# Patient Record
Sex: FEMALE | Race: BLACK OR AFRICAN AMERICAN | NOT HISPANIC OR LATINO
[De-identification: names, ages, dates, MRNs, and addresses within clinical notes are randomized per-mention and may not be internally consistent; named-entity substitution may affect disease eponyms.]

---

## 2017-12-21 ENCOUNTER — RESULT REVIEW (OUTPATIENT)
Age: 52
End: 2017-12-21

## 2018-02-13 ENCOUNTER — INPATIENT (INPATIENT)
Facility: HOSPITAL | Age: 53
LOS: 9 days | Discharge: HOME | End: 2018-02-23
Attending: INTERNAL MEDICINE | Admitting: INTERNAL MEDICINE
Payer: MEDICARE

## 2018-02-13 VITALS
TEMPERATURE: 98 F | SYSTOLIC BLOOD PRESSURE: 123 MMHG | HEIGHT: 63 IN | DIASTOLIC BLOOD PRESSURE: 85 MMHG | OXYGEN SATURATION: 93 % | WEIGHT: 175.05 LBS | RESPIRATION RATE: 20 BRPM | HEART RATE: 102 BPM

## 2018-02-13 LAB
ALBUMIN SERPL ELPH-MCNC: 4.1 G/DL — SIGNIFICANT CHANGE UP (ref 3.3–5)
ALP SERPL-CCNC: 120 U/L — SIGNIFICANT CHANGE UP (ref 40–120)
ALT FLD-CCNC: 53 U/L — SIGNIFICANT CHANGE UP (ref 12–78)
ANION GAP SERPL CALC-SCNC: 10 MMOL/L — SIGNIFICANT CHANGE UP (ref 5–17)
APPEARANCE UR: CLEAR — SIGNIFICANT CHANGE UP
AST SERPL-CCNC: 26 U/L — SIGNIFICANT CHANGE UP (ref 15–37)
BASOPHILS # BLD AUTO: 0.06 K/UL — SIGNIFICANT CHANGE UP (ref 0–0.2)
BASOPHILS NFR BLD AUTO: 0.7 % — SIGNIFICANT CHANGE UP (ref 0–2)
BILIRUB SERPL-MCNC: 0.5 MG/DL — SIGNIFICANT CHANGE UP (ref 0.2–1.2)
BILIRUB UR-MCNC: NEGATIVE — SIGNIFICANT CHANGE UP
BUN SERPL-MCNC: 7 MG/DL — SIGNIFICANT CHANGE UP (ref 7–23)
CALCIUM SERPL-MCNC: 9.4 MG/DL — SIGNIFICANT CHANGE UP (ref 8.5–10.1)
CHLORIDE SERPL-SCNC: 107 MMOL/L — SIGNIFICANT CHANGE UP (ref 96–108)
CO2 SERPL-SCNC: 24 MMOL/L — SIGNIFICANT CHANGE UP (ref 22–31)
COLOR SPEC: YELLOW — SIGNIFICANT CHANGE UP
CREAT SERPL-MCNC: 0.52 MG/DL — SIGNIFICANT CHANGE UP (ref 0.5–1.3)
DIFF PNL FLD: NEGATIVE — SIGNIFICANT CHANGE UP
EOSINOPHIL # BLD AUTO: 0.18 K/UL — SIGNIFICANT CHANGE UP (ref 0–0.5)
EOSINOPHIL NFR BLD AUTO: 2.1 % — SIGNIFICANT CHANGE UP (ref 0–6)
GLUCOSE BLDC GLUCOMTR-MCNC: 147 MG/DL — HIGH (ref 70–99)
GLUCOSE SERPL-MCNC: 131 MG/DL — HIGH (ref 70–99)
GLUCOSE UR QL: NEGATIVE MG/DL — SIGNIFICANT CHANGE UP
HCG SERPL-ACNC: 2 MIU/ML — SIGNIFICANT CHANGE UP
HCT VFR BLD CALC: 41.4 % — SIGNIFICANT CHANGE UP (ref 34.5–45)
HGB BLD-MCNC: 14.4 G/DL — SIGNIFICANT CHANGE UP (ref 11.5–15.5)
IMM GRANULOCYTES NFR BLD AUTO: 0.3 % — SIGNIFICANT CHANGE UP (ref 0–1.5)
KETONES UR-MCNC: NEGATIVE — SIGNIFICANT CHANGE UP
LACTATE SERPL-SCNC: 1.2 MMOL/L — SIGNIFICANT CHANGE UP (ref 0.7–2)
LEUKOCYTE ESTERASE UR-ACNC: NEGATIVE — SIGNIFICANT CHANGE UP
LIDOCAIN IGE QN: 99 U/L — SIGNIFICANT CHANGE UP (ref 73–393)
LYMPHOCYTES # BLD AUTO: 3.5 K/UL — HIGH (ref 1–3.3)
LYMPHOCYTES # BLD AUTO: 40.6 % — SIGNIFICANT CHANGE UP (ref 13–44)
MCHC RBC-ENTMCNC: 29 PG — SIGNIFICANT CHANGE UP (ref 27–34)
MCHC RBC-ENTMCNC: 34.8 GM/DL — SIGNIFICANT CHANGE UP (ref 32–36)
MCV RBC AUTO: 83.5 FL — SIGNIFICANT CHANGE UP (ref 80–100)
MONOCYTES # BLD AUTO: 0.64 K/UL — SIGNIFICANT CHANGE UP (ref 0–0.9)
MONOCYTES NFR BLD AUTO: 7.4 % — SIGNIFICANT CHANGE UP (ref 2–14)
NEUTROPHILS # BLD AUTO: 4.22 K/UL — SIGNIFICANT CHANGE UP (ref 1.8–7.4)
NEUTROPHILS NFR BLD AUTO: 48.9 % — SIGNIFICANT CHANGE UP (ref 43–77)
NITRITE UR-MCNC: NEGATIVE — SIGNIFICANT CHANGE UP
NRBC # BLD: 0 /100 WBCS — SIGNIFICANT CHANGE UP (ref 0–0)
PH UR: 7 — SIGNIFICANT CHANGE UP (ref 5–8)
PLATELET # BLD AUTO: 272 K/UL — SIGNIFICANT CHANGE UP (ref 150–400)
POTASSIUM SERPL-MCNC: 4.8 MMOL/L — SIGNIFICANT CHANGE UP (ref 3.5–5.3)
POTASSIUM SERPL-SCNC: 4.8 MMOL/L — SIGNIFICANT CHANGE UP (ref 3.5–5.3)
PROT SERPL-MCNC: 7.7 GM/DL — SIGNIFICANT CHANGE UP (ref 6–8.3)
PROT UR-MCNC: NEGATIVE MG/DL — SIGNIFICANT CHANGE UP
RBC # BLD: 4.96 M/UL — SIGNIFICANT CHANGE UP (ref 3.8–5.2)
RBC # FLD: 12.7 % — SIGNIFICANT CHANGE UP (ref 10.3–14.5)
SODIUM SERPL-SCNC: 141 MMOL/L — SIGNIFICANT CHANGE UP (ref 135–145)
SP GR SPEC: 1.01 — SIGNIFICANT CHANGE UP (ref 1.01–1.02)
UROBILINOGEN FLD QL: NEGATIVE MG/DL — SIGNIFICANT CHANGE UP
WBC # BLD: 8.63 K/UL — SIGNIFICANT CHANGE UP (ref 3.8–10.5)
WBC # FLD AUTO: 8.63 K/UL — SIGNIFICANT CHANGE UP (ref 3.8–10.5)

## 2018-02-13 PROCEDURE — 76700 US EXAM ABDOM COMPLETE: CPT | Mod: 26

## 2018-02-13 PROCEDURE — 99285 EMERGENCY DEPT VISIT HI MDM: CPT

## 2018-02-13 PROCEDURE — 74177 CT ABD & PELVIS W/CONTRAST: CPT | Mod: 26

## 2018-02-13 PROCEDURE — 99223 1ST HOSP IP/OBS HIGH 75: CPT

## 2018-02-13 RX ORDER — MECLIZINE HCL 12.5 MG
12.5 TABLET ORAL THREE TIMES A DAY
Qty: 0 | Refills: 0 | Status: DISCONTINUED | OUTPATIENT
Start: 2018-02-13 | End: 2018-02-17

## 2018-02-13 RX ORDER — ALBUTEROL 90 UG/1
2 AEROSOL, METERED ORAL EVERY 6 HOURS
Qty: 0 | Refills: 0 | Status: DISCONTINUED | OUTPATIENT
Start: 2018-02-13 | End: 2018-02-17

## 2018-02-13 RX ORDER — TRAZODONE HCL 50 MG
50 TABLET ORAL AT BEDTIME
Qty: 0 | Refills: 0 | Status: DISCONTINUED | OUTPATIENT
Start: 2018-02-13 | End: 2018-02-17

## 2018-02-13 RX ORDER — OXYCODONE AND ACETAMINOPHEN 5; 325 MG/1; MG/1
2 TABLET ORAL EVERY 6 HOURS
Qty: 0 | Refills: 0 | Status: DISCONTINUED | OUTPATIENT
Start: 2018-02-13 | End: 2018-02-17

## 2018-02-13 RX ORDER — SODIUM CHLORIDE 9 MG/ML
1000 INJECTION, SOLUTION INTRAVENOUS
Qty: 0 | Refills: 0 | Status: DISCONTINUED | OUTPATIENT
Start: 2018-02-13 | End: 2018-02-16

## 2018-02-13 RX ORDER — CLONAZEPAM 1 MG
2 TABLET ORAL AT BEDTIME
Qty: 0 | Refills: 0 | Status: DISCONTINUED | OUTPATIENT
Start: 2018-02-13 | End: 2018-02-17

## 2018-02-13 RX ORDER — MORPHINE SULFATE 50 MG/1
2 CAPSULE, EXTENDED RELEASE ORAL ONCE
Qty: 0 | Refills: 0 | Status: DISCONTINUED | OUTPATIENT
Start: 2018-02-13 | End: 2018-02-13

## 2018-02-13 RX ORDER — INSULIN LISPRO 100/ML
VIAL (ML) SUBCUTANEOUS
Qty: 0 | Refills: 0 | Status: DISCONTINUED | OUTPATIENT
Start: 2018-02-13 | End: 2018-02-17

## 2018-02-13 RX ORDER — GABAPENTIN 400 MG/1
600 CAPSULE ORAL THREE TIMES A DAY
Qty: 0 | Refills: 0 | Status: DISCONTINUED | OUTPATIENT
Start: 2018-02-13 | End: 2018-02-17

## 2018-02-13 RX ORDER — ATORVASTATIN CALCIUM 80 MG/1
20 TABLET, FILM COATED ORAL AT BEDTIME
Qty: 0 | Refills: 0 | Status: DISCONTINUED | OUTPATIENT
Start: 2018-02-13 | End: 2018-02-17

## 2018-02-13 RX ORDER — MORPHINE SULFATE 50 MG/1
4 CAPSULE, EXTENDED RELEASE ORAL ONCE
Qty: 0 | Refills: 0 | Status: DISCONTINUED | OUTPATIENT
Start: 2018-02-13 | End: 2018-02-13

## 2018-02-13 RX ORDER — GLUCAGON INJECTION, SOLUTION 0.5 MG/.1ML
1 INJECTION, SOLUTION SUBCUTANEOUS ONCE
Qty: 0 | Refills: 0 | Status: DISCONTINUED | OUTPATIENT
Start: 2018-02-13 | End: 2018-02-17

## 2018-02-13 RX ORDER — INSULIN LISPRO 100/ML
VIAL (ML) SUBCUTANEOUS AT BEDTIME
Qty: 0 | Refills: 0 | Status: DISCONTINUED | OUTPATIENT
Start: 2018-02-13 | End: 2018-02-17

## 2018-02-13 RX ORDER — TIOTROPIUM BROMIDE 18 UG/1
1 CAPSULE ORAL; RESPIRATORY (INHALATION) DAILY
Qty: 0 | Refills: 0 | Status: DISCONTINUED | OUTPATIENT
Start: 2018-02-13 | End: 2018-02-17

## 2018-02-13 RX ORDER — DEXTROSE 50 % IN WATER 50 %
25 SYRINGE (ML) INTRAVENOUS ONCE
Qty: 0 | Refills: 0 | Status: DISCONTINUED | OUTPATIENT
Start: 2018-02-13 | End: 2018-02-17

## 2018-02-13 RX ORDER — KETOROLAC TROMETHAMINE 30 MG/ML
30 SYRINGE (ML) INJECTION ONCE
Qty: 0 | Refills: 0 | Status: DISCONTINUED | OUTPATIENT
Start: 2018-02-13 | End: 2018-02-13

## 2018-02-13 RX ORDER — SODIUM CHLORIDE 9 MG/ML
1000 INJECTION, SOLUTION INTRAVENOUS
Qty: 0 | Refills: 0 | Status: DISCONTINUED | OUTPATIENT
Start: 2018-02-13 | End: 2018-02-17

## 2018-02-13 RX ORDER — NICOTINE POLACRILEX 2 MG
1 GUM BUCCAL DAILY
Qty: 0 | Refills: 0 | Status: DISCONTINUED | OUTPATIENT
Start: 2018-02-13 | End: 2018-02-17

## 2018-02-13 RX ORDER — BACLOFEN 100 %
10 POWDER (GRAM) MISCELLANEOUS THREE TIMES A DAY
Qty: 0 | Refills: 0 | Status: DISCONTINUED | OUTPATIENT
Start: 2018-02-13 | End: 2018-02-17

## 2018-02-13 RX ORDER — DEXTROSE 50 % IN WATER 50 %
1 SYRINGE (ML) INTRAVENOUS ONCE
Qty: 0 | Refills: 0 | Status: DISCONTINUED | OUTPATIENT
Start: 2018-02-13 | End: 2018-02-17

## 2018-02-13 RX ORDER — OXCARBAZEPINE 300 MG/1
300 TABLET, FILM COATED ORAL
Qty: 0 | Refills: 0 | Status: DISCONTINUED | OUTPATIENT
Start: 2018-02-13 | End: 2018-02-17

## 2018-02-13 RX ORDER — ONDANSETRON 8 MG/1
4 TABLET, FILM COATED ORAL ONCE
Qty: 0 | Refills: 0 | Status: COMPLETED | OUTPATIENT
Start: 2018-02-13 | End: 2018-02-13

## 2018-02-13 RX ORDER — DEXTROSE 50 % IN WATER 50 %
12.5 SYRINGE (ML) INTRAVENOUS ONCE
Qty: 0 | Refills: 0 | Status: DISCONTINUED | OUTPATIENT
Start: 2018-02-13 | End: 2018-02-17

## 2018-02-13 RX ADMIN — MORPHINE SULFATE 4 MILLIGRAM(S): 50 CAPSULE, EXTENDED RELEASE ORAL at 21:25

## 2018-02-13 RX ADMIN — MORPHINE SULFATE 4 MILLIGRAM(S): 50 CAPSULE, EXTENDED RELEASE ORAL at 18:02

## 2018-02-13 RX ADMIN — MORPHINE SULFATE 4 MILLIGRAM(S): 50 CAPSULE, EXTENDED RELEASE ORAL at 15:12

## 2018-02-13 RX ADMIN — MORPHINE SULFATE 4 MILLIGRAM(S): 50 CAPSULE, EXTENDED RELEASE ORAL at 16:00

## 2018-02-13 RX ADMIN — ONDANSETRON 4 MILLIGRAM(S): 8 TABLET, FILM COATED ORAL at 15:12

## 2018-02-13 RX ADMIN — Medication 30 MILLIGRAM(S): at 15:12

## 2018-02-13 RX ADMIN — Medication 30 MILLIGRAM(S): at 16:00

## 2018-02-13 NOTE — H&P ADULT - NSHPPHYSICALEXAM_GEN_ALL_CORE
GENERAL: NAD, well-groomed, obese  HEAD:  Atraumatic, Normocephalic  EYES: EOMI, PERRLA, conjunctiva and sclera clear  ENMT: No tonsillar erythema, exudates, or enlargement; Moist mucous membranes, No lesions  NECK: Supple, No JVD, Normal thyroid  NERVOUS SYSTEM:  Alert & Oriented X3, Good concentration  CHEST/LUNG: Clear to ascultation bilaterally; No rales, rhonchi, wheezing, or rubs  HEART: Regular rate and rhythm; No murmurs, rubs, or gallops  ABDOMEN: Soft, RUQ tenderness without rebound or guarding, Nondistended; Bowel sounds present  EXTREMITIES: No clubbing, cyanosis, or edema  SKIN: no rashes or lesions   PSYCH: agitated

## 2018-02-13 NOTE — H&P ADULT - HISTORY OF PRESENT ILLNESS
Admitted for Cholelithiasis, in progress. 52 year old woman with PMH of asthma, HTN, HLD, panic d/o, DM2, bipolar 2, COPD, lung nodules presents complaining of RUQ pain.  She states that for the last month she has had RUQ pain since a CT scan as outpt found gallstone.  The pain has become increasingly worse so her PMD sent her to the hospital.  She denies fever, chills, nausea, vomiting, diarrhea.    In the ED, CT and US ab show Cholelithiasis as well as CBD dilation.  ED discussed with surgeon who advised admission to medicine for further evaluation.

## 2018-02-13 NOTE — H&P ADULT - ASSESSMENT
52 year old woman with PMH of asthma, HTN, HLD, panic d/o, DM2, bipolar 2, COPD, lung nodules presents complaining of RUQ pain.  Signs, symptoms, and work up consistent with symptomatic Cholelithiasis and possibly choledocholithiasis.  Patient will require admission for at least 2 midnights as detailed below:    IMPROVE VTE Individual Risk Assessment          RISK                                                          Points    [  ] Previous VTE                                                3    [  ] Thrombophilia                                             2    [  ] Lower limb paralysis                                    2        (unable to hold up >15 seconds)      [  ] Current Cancer                                             2         (within 6 months)    [  ] Immobilization > 24 hrs                              1    [  ] ICU/CCU stay > 24 hours                            1    [  ] Age > 60                                                    1    IMPROVE VTE Score ______0___

## 2018-02-13 NOTE — ED PROVIDER NOTE - PMH
Bipolar 1 disorder    Crack cocaine use  11 years clean  Depressed    DM (diabetes mellitus)    ETOH abuse    Schizophrenia

## 2018-02-13 NOTE — ED PROVIDER NOTE - OBJECTIVE STATEMENT
52 year old female with PMH of Bipolar, Depression, DM II , presenting to ED due to abdominal pain on RUQ for past 1 month, sent in by Dr. Kuo for evaluation, previous CT chest noted 8mm gallstone. Pt otherwise denies fever/chills, no nausea/vomiting or diarrhea.

## 2018-02-13 NOTE — H&P ADULT - NSHPLABSRESULTS_GEN_ALL_CORE
Vital Signs Last 24 Hrs  T(C): 36.4 (2018 23:53), Max: 36.7 (2018 12:09)  T(F): 97.6 (2018 23:53), Max: 98.1 (2018 15:46)  HR: 83 (2018 23:53) (83 - 102)  BP: 154/83 (2018 23:53) (123/85 - 154/83)  BP(mean): --  RR: 18 (2018 23:53) (17 - 20)  SpO2: 97% (2018 23:53) (93% - 97%)        LABS:                        14.4   8.63  )-----------( 272      ( 2018 15:10 )             41.4     02-13    141  |  107  |  7   ----------------------------<  131<H>  4.8   |  24  |  0.52    Ca    9.4      2018 15:10    TPro  7.7  /  Alb  4.1  /  TBili  0.5  /  DBili  x   /  AST  26  /  ALT  53  /  AlkPhos  120  02-13      Urinalysis Basic - ( 2018 18:14 )    Color: Yellow / Appearance: Clear / S.010 / pH: x  Gluc: x / Ketone: Negative  / Bili: Negative / Urobili: Negative mg/dL   Blood: x / Protein: Negative mg/dL / Nitrite: Negative   Leuk Esterase: Negative / RBC: x / WBC x   Sq Epi: x / Non Sq Epi: x / Bacteria: x        RADIOLOGY & ADDITIONAL STUDIES:    US ab:  IMPRESSION:   Hepatomegaly and hepatic fibrofatty changes.  Biliary ductal dilatation. MRCP suggested for further assessment.  Cholelithiasis.    CT ab:  IMPRESSION:   8 mm gallstone at the junction of the gallbladder neck and cystic duct.  Gallbladder is otherwise within normal limits.  CBD dilatation up to 1 cm, possibly secondary to chronic stricture.

## 2018-02-13 NOTE — ED PROVIDER NOTE - MEDICAL DECISION MAKING DETAILS
RUQ abd pain to r/o renata vs cholelithiasis related colic pain - pending imaging for evaluation - signed out to Dr. Bush

## 2018-02-13 NOTE — H&P ADULT - NSHPREVIEWOFSYSTEMS_GEN_ALL_CORE
No fever/chills, No photophobia/eye pain/changes in vision,  No chest pain/palpitations, no SOB/cough/wheeze/stridor, No N/V/D, no dysuria/frequency/discharge, No neck/back pain, no rash, no changes in neurological status/function.

## 2018-02-13 NOTE — H&P ADULT - PROBLEM SELECTOR PLAN 1
NPO except meds for now.  Mgmt as per surgery/GI  Start D5 NS at 80 ml/hr for 24 hours  LFTs wnl  Serial abdominal examinations and observations.  Morphine PRN for pain  Zofran PRN for nausea/vomiting

## 2018-02-13 NOTE — ED ADULT TRIAGE NOTE - CHIEF COMPLAINT QUOTE
Pt sent by Dr. Kirkpatrick for further evaluation of RUQ abdominal pain.   Pt c/o right sided abdominal pain radiating to right lower back.  +nausea,  denies vomiting and diarrhea.

## 2018-02-13 NOTE — ED PROVIDER NOTE - PROGRESS NOTE DETAILS
Eleazar: Patient signed out by Dr. Ribera pending CT and Sx consult. CT reviewed and Sx consult appreciated. Patient given morphine. Patient now admitted to further management and MRCP.

## 2018-02-14 DIAGNOSIS — E11.42 TYPE 2 DIABETES MELLITUS WITH DIABETIC POLYNEUROPATHY: ICD-10-CM

## 2018-02-14 DIAGNOSIS — J45.30 MILD PERSISTENT ASTHMA, UNCOMPLICATED: ICD-10-CM

## 2018-02-14 DIAGNOSIS — E66.09 OTHER OBESITY DUE TO EXCESS CALORIES: ICD-10-CM

## 2018-02-14 DIAGNOSIS — K80.20 CALCULUS OF GALLBLADDER WITHOUT CHOLECYSTITIS WITHOUT OBSTRUCTION: ICD-10-CM

## 2018-02-14 DIAGNOSIS — F31.81 BIPOLAR II DISORDER: ICD-10-CM

## 2018-02-14 DIAGNOSIS — Z29.9 ENCOUNTER FOR PROPHYLACTIC MEASURES, UNSPECIFIED: ICD-10-CM

## 2018-02-14 DIAGNOSIS — Z72.0 TOBACCO USE: ICD-10-CM

## 2018-02-14 DIAGNOSIS — F20.9 SCHIZOPHRENIA, UNSPECIFIED: ICD-10-CM

## 2018-02-14 DIAGNOSIS — K83.8 OTHER SPECIFIED DISEASES OF BILIARY TRACT: ICD-10-CM

## 2018-02-14 DIAGNOSIS — E78.2 MIXED HYPERLIPIDEMIA: ICD-10-CM

## 2018-02-14 DIAGNOSIS — Z11.59 ENCOUNTER FOR SCREENING FOR OTHER VIRAL DISEASES: ICD-10-CM

## 2018-02-14 LAB
ALBUMIN SERPL ELPH-MCNC: 3.5 G/DL — SIGNIFICANT CHANGE UP (ref 3.3–5)
ALP SERPL-CCNC: 101 U/L — SIGNIFICANT CHANGE UP (ref 40–120)
ALT FLD-CCNC: 40 U/L — SIGNIFICANT CHANGE UP (ref 12–78)
ANION GAP SERPL CALC-SCNC: 8 MMOL/L — SIGNIFICANT CHANGE UP (ref 5–17)
AST SERPL-CCNC: 17 U/L — SIGNIFICANT CHANGE UP (ref 15–37)
BILIRUB DIRECT SERPL-MCNC: 0.12 MG/DL — SIGNIFICANT CHANGE UP (ref 0.05–0.2)
BILIRUB INDIRECT FLD-MCNC: 0.3 MG/DL — SIGNIFICANT CHANGE UP (ref 0.2–1)
BILIRUB SERPL-MCNC: 0.4 MG/DL — SIGNIFICANT CHANGE UP (ref 0.2–1.2)
BUN SERPL-MCNC: 12 MG/DL — SIGNIFICANT CHANGE UP (ref 7–23)
CALCIUM SERPL-MCNC: 8.4 MG/DL — LOW (ref 8.5–10.1)
CEA SERPL-MCNC: 2.6 NG/ML — SIGNIFICANT CHANGE UP (ref 0–3.8)
CHLORIDE SERPL-SCNC: 107 MMOL/L — SIGNIFICANT CHANGE UP (ref 96–108)
CO2 SERPL-SCNC: 27 MMOL/L — SIGNIFICANT CHANGE UP (ref 22–31)
CREAT SERPL-MCNC: 0.48 MG/DL — LOW (ref 0.5–1.3)
GLUCOSE SERPL-MCNC: 155 MG/DL — HIGH (ref 70–99)
HCT VFR BLD CALC: 37.9 % — SIGNIFICANT CHANGE UP (ref 34.5–45)
HCV AB S/CO SERPL IA: 0.09 S/CO — SIGNIFICANT CHANGE UP
HCV AB SERPL-IMP: SIGNIFICANT CHANGE UP
HGB BLD-MCNC: 13.2 G/DL — SIGNIFICANT CHANGE UP (ref 11.5–15.5)
MAGNESIUM SERPL-MCNC: 2.2 MG/DL — SIGNIFICANT CHANGE UP (ref 1.6–2.6)
MCHC RBC-ENTMCNC: 29.5 PG — SIGNIFICANT CHANGE UP (ref 27–34)
MCHC RBC-ENTMCNC: 34.8 GM/DL — SIGNIFICANT CHANGE UP (ref 32–36)
MCV RBC AUTO: 84.6 FL — SIGNIFICANT CHANGE UP (ref 80–100)
NRBC # BLD: 0 /100 WBCS — SIGNIFICANT CHANGE UP (ref 0–0)
PHOSPHATE SERPL-MCNC: 4.4 MG/DL — SIGNIFICANT CHANGE UP (ref 2.5–4.5)
PLATELET # BLD AUTO: 241 K/UL — SIGNIFICANT CHANGE UP (ref 150–400)
POTASSIUM SERPL-MCNC: 4.1 MMOL/L — SIGNIFICANT CHANGE UP (ref 3.5–5.3)
POTASSIUM SERPL-SCNC: 4.1 MMOL/L — SIGNIFICANT CHANGE UP (ref 3.5–5.3)
PROT SERPL-MCNC: 6.5 GM/DL — SIGNIFICANT CHANGE UP (ref 6–8.3)
RBC # BLD: 4.48 M/UL — SIGNIFICANT CHANGE UP (ref 3.8–5.2)
RBC # FLD: 12.7 % — SIGNIFICANT CHANGE UP (ref 10.3–14.5)
SODIUM SERPL-SCNC: 142 MMOL/L — SIGNIFICANT CHANGE UP (ref 135–145)
WBC # BLD: 8.06 K/UL — SIGNIFICANT CHANGE UP (ref 3.8–10.5)
WBC # FLD AUTO: 8.06 K/UL — SIGNIFICANT CHANGE UP (ref 3.8–10.5)

## 2018-02-14 PROCEDURE — 74181 MRI ABDOMEN W/O CONTRAST: CPT | Mod: 26

## 2018-02-14 PROCEDURE — 99233 SBSQ HOSP IP/OBS HIGH 50: CPT

## 2018-02-14 RX ORDER — MORPHINE SULFATE 50 MG/1
2 CAPSULE, EXTENDED RELEASE ORAL ONCE
Qty: 0 | Refills: 0 | Status: DISCONTINUED | OUTPATIENT
Start: 2018-02-14 | End: 2018-02-14

## 2018-02-14 RX ORDER — INFLUENZA VIRUS VACCINE 15; 15; 15; 15 UG/.5ML; UG/.5ML; UG/.5ML; UG/.5ML
0.5 SUSPENSION INTRAMUSCULAR ONCE
Qty: 0 | Refills: 0 | Status: COMPLETED | OUTPATIENT
Start: 2018-02-14 | End: 2018-02-14

## 2018-02-14 RX ORDER — MORPHINE SULFATE 50 MG/1
1 CAPSULE, EXTENDED RELEASE ORAL EVERY 4 HOURS
Qty: 0 | Refills: 0 | Status: DISCONTINUED | OUTPATIENT
Start: 2018-02-14 | End: 2018-02-17

## 2018-02-14 RX ORDER — DIPHENHYDRAMINE HCL 50 MG
12.5 CAPSULE ORAL EVERY 8 HOURS
Qty: 0 | Refills: 0 | Status: DISCONTINUED | OUTPATIENT
Start: 2018-02-14 | End: 2018-02-17

## 2018-02-14 RX ORDER — ONDANSETRON 8 MG/1
8 TABLET, FILM COATED ORAL EVERY 8 HOURS
Qty: 0 | Refills: 0 | Status: DISCONTINUED | OUTPATIENT
Start: 2018-02-14 | End: 2018-02-16

## 2018-02-14 RX ADMIN — GABAPENTIN 600 MILLIGRAM(S): 400 CAPSULE ORAL at 21:44

## 2018-02-14 RX ADMIN — OXYCODONE AND ACETAMINOPHEN 2 TABLET(S): 5; 325 TABLET ORAL at 10:53

## 2018-02-14 RX ADMIN — GABAPENTIN 600 MILLIGRAM(S): 400 CAPSULE ORAL at 14:23

## 2018-02-14 RX ADMIN — MORPHINE SULFATE 1 MILLIGRAM(S): 50 CAPSULE, EXTENDED RELEASE ORAL at 04:31

## 2018-02-14 RX ADMIN — MORPHINE SULFATE 2 MILLIGRAM(S): 50 CAPSULE, EXTENDED RELEASE ORAL at 02:09

## 2018-02-14 RX ADMIN — Medication 2 MILLIGRAM(S): at 21:44

## 2018-02-14 RX ADMIN — Medication 1 PATCH: at 12:50

## 2018-02-14 RX ADMIN — OXCARBAZEPINE 300 MILLIGRAM(S): 300 TABLET, FILM COATED ORAL at 06:03

## 2018-02-14 RX ADMIN — SODIUM CHLORIDE 80 MILLILITER(S): 9 INJECTION, SOLUTION INTRAVENOUS at 00:55

## 2018-02-14 RX ADMIN — MORPHINE SULFATE 1 MILLIGRAM(S): 50 CAPSULE, EXTENDED RELEASE ORAL at 22:52

## 2018-02-14 RX ADMIN — Medication 12.5 MILLIGRAM(S): at 17:00

## 2018-02-14 RX ADMIN — Medication 50 MILLIGRAM(S): at 22:40

## 2018-02-14 RX ADMIN — SODIUM CHLORIDE 80 MILLILITER(S): 9 INJECTION, SOLUTION INTRAVENOUS at 10:41

## 2018-02-14 RX ADMIN — MORPHINE SULFATE 2 MILLIGRAM(S): 50 CAPSULE, EXTENDED RELEASE ORAL at 07:24

## 2018-02-14 RX ADMIN — OXYCODONE AND ACETAMINOPHEN 2 TABLET(S): 5; 325 TABLET ORAL at 09:53

## 2018-02-14 RX ADMIN — Medication 4: at 08:56

## 2018-02-14 RX ADMIN — ONDANSETRON 8 MILLIGRAM(S): 8 TABLET, FILM COATED ORAL at 04:29

## 2018-02-14 RX ADMIN — OXYCODONE AND ACETAMINOPHEN 2 TABLET(S): 5; 325 TABLET ORAL at 18:00

## 2018-02-14 RX ADMIN — MORPHINE SULFATE 2 MILLIGRAM(S): 50 CAPSULE, EXTENDED RELEASE ORAL at 07:49

## 2018-02-14 RX ADMIN — OXYCODONE AND ACETAMINOPHEN 2 TABLET(S): 5; 325 TABLET ORAL at 17:00

## 2018-02-14 RX ADMIN — MORPHINE SULFATE 2 MILLIGRAM(S): 50 CAPSULE, EXTENDED RELEASE ORAL at 00:22

## 2018-02-14 RX ADMIN — TIOTROPIUM BROMIDE 1 CAPSULE(S): 18 CAPSULE ORAL; RESPIRATORY (INHALATION) at 12:40

## 2018-02-14 RX ADMIN — OXCARBAZEPINE 300 MILLIGRAM(S): 300 TABLET, FILM COATED ORAL at 19:28

## 2018-02-14 RX ADMIN — ATORVASTATIN CALCIUM 20 MILLIGRAM(S): 80 TABLET, FILM COATED ORAL at 21:44

## 2018-02-14 RX ADMIN — MORPHINE SULFATE 1 MILLIGRAM(S): 50 CAPSULE, EXTENDED RELEASE ORAL at 04:21

## 2018-02-14 RX ADMIN — Medication 1 TABLET(S): at 12:38

## 2018-02-14 RX ADMIN — MORPHINE SULFATE 1 MILLIGRAM(S): 50 CAPSULE, EXTENDED RELEASE ORAL at 22:37

## 2018-02-14 RX ADMIN — GABAPENTIN 600 MILLIGRAM(S): 400 CAPSULE ORAL at 06:03

## 2018-02-14 NOTE — PROGRESS NOTE ADULT - SUBJECTIVE AND OBJECTIVE BOX
Patient is a 52y old  Female who presents with a chief complaint of RUQ pain (2018 03:02)    HPI:  52 year old woman with PMH of asthma, HTN, HLD, panic d/o, DM2, bipolar 2, COPD, lung nodules presents complaining of RUQ pain.  She states that for the last month she has had RUQ pain since a CT scan as outpt found gallstone.  The pain has become increasingly worse so her PMD sent her to the hospital.  She denies fever, chills, nausea, vomiting, diarrhea.    In the ED, CT and US ab show Cholelithiasis as well as CBD dilation.  ED discussed with surgeon who advised admission to medicine for further evaluation. (2018 23:30)      SUBJECTIVE & OBJECTIVE: Pt seen and examined at bedside. Complaining of being very hungry and asking for food.  Had MRCP this morning. Pending results.     PHYSICAL EXAM:  T(C): 36.5 (18 @ 05:49), Max: 36.7 (18 @ 12:09)  HR: 78 (18 @ 05:49) (78 - 102)  BP: 166/81 (18 @ 05:49) (123/85 - 168/91)  RR: 17 (18 @ 05:49) (17 - 20)  SpO2: 92% (18 @ 05:49) (92% - 98%)  Wt(kg): -- Height (cm): 160.02 ( @ 01:30)  Weight (kg): 76.4 ( @ 01:30)  BMI (kg/m2): 29.8 ( @ 01:30)  BSA (m2): 1.8 ( @ 01:30)  GENERAL: NAD, well-groomed, well-developed  HEAD:  Atraumatic, Normocephalic  EYES: EOMI, PERRLA, conjunctiva and sclera clear  ENMT: Moist mucous membranes  NECK: Supple, No JVD  NERVOUS SYSTEM:  Alert & Oriented X3, Motor Strength 5/5 B/L upper and lower extremities; DTRs 2+ intact and symmetric  CHEST/LUNG: Clear to auscultation bilaterally; No rales, rhonchi, wheezing, or rubs  HEART: Regular rate and rhythm; No murmurs, rubs, or gallops  ABDOMEN: Soft, Nontender, Nondistended; Bowel sounds present  EXTREMITIES:  2+ Peripheral Pulses, No clubbing, cyanosis, or edema        MEDICATIONS  (STANDING):  atorvastatin 20 milliGRAM(s) Oral at bedtime  clonazePAM Tablet 2 milliGRAM(s) Oral at bedtime  dextrose 5% + sodium chloride 0.9%. 1000 milliLiter(s) (80 mL/Hr) IV Continuous <Continuous>  dextrose 5%. 1000 milliLiter(s) (50 mL/Hr) IV Continuous <Continuous>  dextrose 50% Injectable 12.5 Gram(s) IV Push once  dextrose 50% Injectable 25 Gram(s) IV Push once  dextrose 50% Injectable 25 Gram(s) IV Push once  gabapentin 600 milliGRAM(s) Oral three times a day  insulin lispro (HumaLOG) corrective regimen sliding scale   SubCutaneous three times a day before meals  insulin lispro (HumaLOG) corrective regimen sliding scale   SubCutaneous at bedtime  multivitamin 1 Tablet(s) Oral daily  nicotine - 21 mG/24Hr(s) Patch 1 patch Transdermal daily  OXcarbazepine 300 milliGRAM(s) Oral two times a day  tiotropium 18 MICROgram(s) Capsule 1 Capsule(s) Inhalation daily  traZODone 50 milliGRAM(s) Oral at bedtime    MEDICATIONS  (PRN):  ALBUTerol    90 MICROgram(s) HFA Inhaler 2 Puff(s) Inhalation every 6 hours PRN Shortness of Breath and/or Wheezing  baclofen 10 milliGRAM(s) Oral three times a day PRN Mild pain  dextrose Gel 1 Dose(s) Oral once PRN Blood Glucose LESS THAN 70 milliGRAM(s)/deciliter  glucagon  Injectable 1 milliGRAM(s) IntraMuscular once PRN Glucose LESS THAN 70 milligrams/deciliter  meclizine 12.5 milliGRAM(s) Oral three times a day PRN Dizziness  morphine  - Injectable 1 milliGRAM(s) IV Push every 4 hours PRN Severe Pain (7 - 10)  ondansetron Injectable 8 milliGRAM(s) IV Push every 8 hours PRN Nausea and/or Vomiting  oxyCODONE    5 mG/acetaminophen 325 mG 2 Tablet(s) Oral every 6 hours PRN Moderate Pain (4 - 6)      LABS:                        13.2   8.06  )-----------( 241      ( 2018 06:26 )             37.9         142  |  107  |  12  ----------------------------<  155<H>  4.1   |  27  |  0.48<L>    Ca    8.4<L>      2018 06:26  Phos  4.4       Mg     2.2       TPro  6.5  /  Alb  3.5  /  TBili  0.4  /  DBili  .12  /  AST  17  /  ALT  40  /  AlkPhos  101    Urinalysis Basic - ( 2018 18:14 )    Color: Yellow / Appearance: Clear / S.010 / pH: x  Gluc: x / Ketone: Negative  / Bili: Negative / Urobili: Negative mg/dL   Blood: x / Protein: Negative mg/dL / Nitrite: Negative   Leuk Esterase: Negative / RBC: x / WBC x   Sq Epi: x / Non Sq Epi: x / Bacteria: x  Magnesium, Serum: 2.2 mg/dL ( @ 06:26)    POCT Blood Glucose.: 205 mg/dL (2018 08:19)  POCT Blood Glucose.: 147 mg/dL (2018 19:36)    RECENT CULTURES:  none pending    RADIOLOGY & ADDITIONAL TESTS:  < from: CT Abdomen and Pelvis w/ IV Cont (18 @ 20:46) >  IMPRESSION:   8 mm gallstone at the junction of the gallbladder neckand cystic duct.  Gallbladder is otherwise within normal limits.  CBD dilatation up to 1 cm, possibly secondary to chronic stricture.  < end of copied text >     Pending MRCP                      DVT/GI ppx  Discussed with pt @ bedside

## 2018-02-14 NOTE — CONSULT NOTE ADULT - SUBJECTIVE AND OBJECTIVE BOX
GENERAL SURGERY CONSULT NOTE    Patient seen and examined at bedside with Dr. Ramos at 8pm. Patient is a 52 year old  female PMHx bipolar 1, schizophrenia, depression, DM, PSHx of c-sections x 2 c/o intermittent RUQ pain x 1 month. Pain is associated with nausea but denies vomiting. Denies association of pain with food intake. Normal BM/flatus. Patient denies fever, chills, vomiting, constipation, diarrhea, hematuria, melena, hematochezia, chest pain, shortness of breath, dizziness.       PMHx: bipolar 1, schizophrenia, depression, DM  PSHx: c-sections x 2  Allergies: denies  Social hx: past use of cocaine and ETOH abuse      MEDICATIONS  (STANDING):  atorvastatin 20 milliGRAM(s) Oral at bedtime  clonazePAM Tablet 2 milliGRAM(s) Oral at bedtime  dextrose 5% + sodium chloride 0.9%. 1000 milliLiter(s) (80 mL/Hr) IV Continuous <Continuous>  dextrose 5%. 1000 milliLiter(s) (50 mL/Hr) IV Continuous <Continuous>  dextrose 50% Injectable 12.5 Gram(s) IV Push once  dextrose 50% Injectable 25 Gram(s) IV Push once  dextrose 50% Injectable 25 Gram(s) IV Push once  gabapentin 600 milliGRAM(s) Oral three times a day  insulin lispro (HumaLOG) corrective regimen sliding scale   SubCutaneous three times a day before meals  insulin lispro (HumaLOG) corrective regimen sliding scale   SubCutaneous at bedtime  morphine  - Injectable 2 milliGRAM(s) IV Push Once  nicotine - 21 mG/24Hr(s) Patch 1 patch Transdermal daily  OXcarbazepine 300 milliGRAM(s) Oral two times a day  tiotropium 18 MICROgram(s) Capsule 1 Capsule(s) Inhalation daily  traZODone 50 milliGRAM(s) Oral at bedtime  vitamin B complex with vitamin C 1 Tablet(s) Oral daily    MEDICATIONS  (PRN):  ALBUTerol    90 MICROgram(s) HFA Inhaler 2 Puff(s) Inhalation every 6 hours PRN Shortness of Breath and/or Wheezing  baclofen 10 milliGRAM(s) Oral three times a day PRN Mild pain  dextrose Gel 1 Dose(s) Oral once PRN Blood Glucose LESS THAN 70 milliGRAM(s)/deciliter  glucagon  Injectable 1 milliGRAM(s) IntraMuscular once PRN Glucose LESS THAN 70 milligrams/deciliter  meclizine 12.5 milliGRAM(s) Oral three times a day PRN Dizziness  oxyCODONE    5 mG/acetaminophen 325 mG 2 Tablet(s) Oral every 6 hours PRN Severe Pain (7 - 10)      Vital Signs Last 24 Hrs  T(C): 36.4 (2018 23:53), Max: 36.7 (2018 12:09)  T(F): 97.6 (2018 23:53), Max: 98.1 (2018 15:46)  HR: 83 (2018 23:53) (83 - 102)  BP: 154/83 (2018 23:53) (123/85 - 154/83)  BP(mean): --  RR: 18 (2018 23:53) (17 - 20)  SpO2: 97% (2018 23:53) (93% - 97%)    GEN: NAD  HEAD: conjunctiva clear  LUNGS: CTA b/l  HEART: S1S2 RRR  ADBOMEN: nondistended, +BS, soft, RUQ tenderness  Extremities: no calf tenderness or swelling b/l      LABS:                        14.4   8.63  )-----------( 272      ( 2018 15:10 )             41.4     02-    141  |  107  |  7   ----------------------------<  131<H>  4.8   |  24  |  0.52    Ca    9.4      2018 15:10    TPro  7.7  /  Alb  4.1  /  TBili  0.5  /  DBili  x   /  AST  26  /  ALT  53  /  AlkPhos  120  02-13      Urinalysis Basic - ( 2018 18:14 )    Color: Yellow / Appearance: Clear / S.010 / pH: x  Gluc: x / Ketone: Negative  / Bili: Negative / Urobili: Negative mg/dL   Blood: x / Protein: Negative mg/dL / Nitrite: Negative   Leuk Esterase: Negative / RBC: x / WBC x   Sq Epi: x / Non Sq Epi: x / Bacteria: x    CT Abdomen and Pelvis w/ IV Cont (18 @ 20:46)   IMPRESSION:   8 mm gallstone at the junction of the gallbladder neckand cystic duct.  Gallbladder is otherwise within normal limits.  CBD dilatation up to 1 cm, possibly secondary to chronic stricture.    US Abdomen Complete (18 @ 19:03)   IMPRESSION:   Hepatomegaly and hepatic fibrofatty changes.  Biliary ductal dilatation. MRCP suggested for further assessment.  Cholelithiasis.        Assessment: 52 year old  female PMHx bipolar 1, schizophrenia, depression, DM, PSHx of c-sections x 2 admitted with cholelithiasis and CBD dilatation possibly secondary to stricture    Plan:  -Recommend MRCP  -Possible HIDA  -GI consult  -pain management consult  -continue medical management of patient  -f/u labs  -Discussed with Dr. Ramos

## 2018-02-14 NOTE — CONSULT NOTE ADULT - SUBJECTIVE AND OBJECTIVE BOX
Chief Complaint:  Patient is a 52y old  Female who presents with a chief complaint of RUQ pain (2018 03:02)      HPI:  52 year old woman with PMH of asthma, HTN, HLD, panic d/o, DM2, bipolar 2, COPD, lung nodules presents complaining of RUQ pain.  She states that for the last month she has had RUQ pain since a CT scan as outpt found gallstone.  The pain has become increasingly worse so her PMD sent her to the hospital.  She denies fever, chills, nausea, vomiting, diarrhea.      PMH/PSH:PAST MEDICAL & SURGICAL HISTORY:  Depressed  Schizophrenia  Bipolar 1 disorder  DM (diabetes mellitus)  ETOH abuse  Crack cocaine use: 11 years clean  No significant past surgical history      Allergies:  No Known Allergies      Medications:  ALBUTerol    90 MICROgram(s) HFA Inhaler 2 Puff(s) Inhalation every 6 hours PRN  atorvastatin 20 milliGRAM(s) Oral at bedtime  baclofen 10 milliGRAM(s) Oral three times a day PRN  clonazePAM Tablet 2 milliGRAM(s) Oral at bedtime  dextrose 5% + sodium chloride 0.9%. 1000 milliLiter(s) IV Continuous <Continuous>  dextrose 5%. 1000 milliLiter(s) IV Continuous <Continuous>  dextrose 50% Injectable 12.5 Gram(s) IV Push once  dextrose 50% Injectable 25 Gram(s) IV Push once  dextrose 50% Injectable 25 Gram(s) IV Push once  dextrose Gel 1 Dose(s) Oral once PRN  diphenhydrAMINE   Injectable 12.5 milliGRAM(s) IV Push every 8 hours PRN  gabapentin 600 milliGRAM(s) Oral three times a day  glucagon  Injectable 1 milliGRAM(s) IntraMuscular once PRN  insulin lispro (HumaLOG) corrective regimen sliding scale   SubCutaneous three times a day before meals  insulin lispro (HumaLOG) corrective regimen sliding scale   SubCutaneous at bedtime  meclizine 12.5 milliGRAM(s) Oral three times a day PRN  morphine  - Injectable 1 milliGRAM(s) IV Push every 4 hours PRN  multivitamin 1 Tablet(s) Oral daily  nicotine - 21 mG/24Hr(s) Patch 1 patch Transdermal daily  ondansetron Injectable 8 milliGRAM(s) IV Push every 8 hours PRN  OXcarbazepine 300 milliGRAM(s) Oral two times a day  oxyCODONE    5 mG/acetaminophen 325 mG 2 Tablet(s) Oral every 6 hours PRN  tiotropium 18 MICROgram(s) Capsule 1 Capsule(s) Inhalation daily  traZODone 50 milliGRAM(s) Oral at bedtime      Review of Systems:    General:  No weight loss, fevers, chills, night sweats, fatigue,   Eyes:  Good vision, no reported pain  ENT:  No sore throat, pain, runny nose, dysphagia  CV:  No pain, palpitations, hypo/hypertension  Resp:  No dyspnea, cough, tachypnea, wheezing  GI: +RUQ pain, No nausea, No vomiting, No diarrhea, No constipation, No pruritis, No rectal bleeding, No tarry stools, No dysphagia,  :  No pain, bleeding, incontinence, nocturia  Muscle:  No pain, weakness  Breast:  No pain, abscess, mass, discharge  Neuro:  No weakness, tingling, memory problems  Psych:  No fatigue, insomnia, mood problems, depression  Endocrine:  No polyuria, polydypsia, cold/heat intolerance  Heme:  No petechiae, ecchymosis, easy bruisability  Skin:  No rash, tattoos, scars, edema    Relevant Family History:   FAMILY HISTORY:  No pertinent family history in first degree relatives      Relevant Social History: Alcohol ( ) , Tobacco ( ) , Illicit drugs ( )     Physical Exam:    Vital Signs:  Vital Signs Last 24 Hrs  T(C): 36.5 (2018 05:49), Max: 36.7 (2018 12:09)  T(F): 97.7 (2018 05:49), Max: 98.1 (2018 15:46)  HR: 78 (2018 05:49) (78 - 102)  BP: 166/81 (2018 05:49) (123/85 - 168/91)  BP(mean): --  RR: 17 (2018 05:49) (17 - 20)  SpO2: 92% (2018 05:49) (92% - 98%)  Daily Height in cm: 160.02 (2018 01:30)    Daily     General:  Appears stated age, well-groomed, well-nourished, no distress  HEENT:  NC/AT,  conjunctivae clear and pink, no thyromegaly, nodules, adenopathy, no JVD  Chest:  Full & symmetric excursion, no increased effort, breath sounds clear  Cardiovascular:  Regular rhythm, S1, S2, no murmur/rub/S3/S4, no abdominal bruit, no edema  Abdomen:  Soft, non-tender, non-distended, normoactive bowel sounds,  no masses , no hepatosplenomegaly, no signs of chronic liver disease  Extremities:  no cyanosis, clubbing or edema  Skin:  No rash/erythema/ecchymoses/petechiae/wounds/abscess/warm/dry  Neuro/Psych:  Alert, oriented, no asterixis, no tremor, no encephalopathy    Laboratory:                          13.2   8.06  )-----------( 241      ( 2018 06:26 )             37.9     -14    142  |  107  |  12  ----------------------------<  155<H>  4.1   |  27  |  0.48<L>    Ca    8.4<L>      2018 06:26  Phos  4.4     -  Mg     2.2         TPro  6.5  /  Alb  3.5  /  TBili  0.4  /  DBili  .12  /  AST  17  /  ALT  40  /  AlkPhos  101  -    LIVER FUNCTIONS - ( 2018 06:26 )  Alb: 3.5 g/dL / Pro: 6.5 gm/dL / ALK PHOS: 101 U/L / ALT: 40 U/L / AST: 17 U/L / GGT: x             Urinalysis Basic - ( 2018 18:14 )    Color: Yellow / Appearance: Clear / S.010 / pH: x  Gluc: x / Ketone: Negative  / Bili: Negative / Urobili: Negative mg/dL   Blood: x / Protein: Negative mg/dL / Nitrite: Negative   Leuk Esterase: Negative / RBC: x / WBC x   Sq Epi: x / Non Sq Epi: x / Bacteria: x    Lipase serum99 U/L       Imaging:  < from: CT Abdomen and Pelvis w/ IV Cont (18 @ 20:46) >    EXAM:  CT ABDOMEN AND PELVIS IC                            PROCEDURE DATE:  2018          INTERPRETATION:  CLINICAL INFORMATION: Right upper quadrant abdominal pain    COMPARISON: Ultrasound dated 2018    PROCEDURE:   CT of the Abdomen and Pelvis was performed with intravenous contrast.   Intravenous contrast: 96 ml Omnipaque 350. 4 ml discarded.  Oral contrast: None.  Sagittal and coronal reformats were performed.    FINDINGS:    LOWER CHEST: Minimal basilar atelectasis. Small pericardial effusion.    LIVER: Low-attenuation, compatible with hepatic steatosis.  BILE DUCTS: Extrahepatic biliary ductal dilatation measuring up to 1 cm.  GALLBLADDER: 8 mm calculus appears to be at the junction of the   gallbladder neck and cystic duct. Gallbladder is otherwise within normal   limits.  SPLEEN: Within normal limits.  PANCREAS: Within normal limits.  ADRENALS: Within normal limits.  KIDNEYS/URETERS: 9 mm hypodense right renal lesion, too small to further   characterize. 3 mm nonobstructive left renal calculus. No hydronephrosis.    BLADDER: Partially contracted.  REPRODUCTIVE ORGANS: The uterus and adnexa are within normal limits.    BOWEL: No bowel obstruction. Appendix within normal limits.  PERITONEUM: No ascites.  VESSELS:  Mild atherosclerotic changes.  RETROPERITONEUM: No lymphadenopathy.    ABDOMINAL WALL: Midline anterior abdominal wall incisional scar.  BONES: Mild spinal degenerative changes.    IMPRESSION:     8 mm gallstone at the junction of the gallbladder neckand cystic duct.  Gallbladder is otherwise within normal limits.  CBD dilatation up to 1 cm, possibly secondary to chronic stricture.    MAC VALLE M.D., ATTENDING RADIOLOGIST  This document has been electronically signed. 2018 8:54PM       < from: US Abdomen Complete (18 @ 19:03) >    EXAM:  US ABDOMINAL COMPLETE                            PROCEDURE DATE:  2018          INTERPRETATION:  CLINICAL INFORMATION: Right upper quadrant abdominal pain    COMPARISON: None available.    TECHNIQUE: Sonography of the abdomen.     FINDINGS:    Liver: Increased echogenicity. Enlarged.    Bile ducts: Intrahepatic and extrahepatic dilatation. Common bile duct   measures 11 mm.     Gallbladder: Cholelithiasis. No wall thickening. Right upper quadrant   tenderness.        Pancreas: Poorly visualized.    Spleen: 10.6 cm. Within normal limits.    Right kidney: 9.3 cm. No hydronephrosis.        Left kidney: 10.8 cm.  No hydronephrosis.        Ascites: None.    Aorta and IVC: Visualized portions are within normal limits.    IMPRESSION:     Hepatomegaly and hepatic fibrofatty changes.  Biliary ductal dilatation. MRCP suggested for further assessment.  Cholelithiasis.      MAC VALLE M.D., ATTENDING RADIOLOGIST  This document has been electronically signed. 2018  7:08PM

## 2018-02-14 NOTE — PROGRESS NOTE ADULT - SUBJECTIVE AND OBJECTIVE BOX
INTERVAL HPI/OVERNIGHT EVENTS:    Patient sitting comfortably.  Abdominal pain slightly improved.  No complaint of nausea/vomiting.  +Flatus.  Voiding.       Vital Signs Last 24 Hrs  T(C): 36.5 (2018 05:49), Max: 36.7 (2018 12:09)  T(F): 97.7 (2018 05:49), Max: 98.1 (2018 15:46)  HR: 78 (2018 05:49) (78 - 102)  BP: 166/81 (2018 05:49) (123/85 - 168/91)  BP(mean): --  RR: 17 (2018 05:49) (17 - 20)  SpO2: 92% (2018 05:49) (92% - 98%)    MEDICATIONS  (STANDING):  atorvastatin 20 milliGRAM(s) Oral at bedtime  clonazePAM Tablet 2 milliGRAM(s) Oral at bedtime  dextrose 5% + sodium chloride 0.9%. 1000 milliLiter(s) (80 mL/Hr) IV Continuous <Continuous>  dextrose 5%. 1000 milliLiter(s) (50 mL/Hr) IV Continuous <Continuous>  dextrose 50% Injectable 12.5 Gram(s) IV Push once  dextrose 50% Injectable 25 Gram(s) IV Push once  dextrose 50% Injectable 25 Gram(s) IV Push once  gabapentin 600 milliGRAM(s) Oral three times a day  insulin lispro (HumaLOG) corrective regimen sliding scale   SubCutaneous three times a day before meals  insulin lispro (HumaLOG) corrective regimen sliding scale   SubCutaneous at bedtime  multivitamin 1 Tablet(s) Oral daily  nicotine - 21 mG/24Hr(s) Patch 1 patch Transdermal daily  OXcarbazepine 300 milliGRAM(s) Oral two times a day  tiotropium 18 MICROgram(s) Capsule 1 Capsule(s) Inhalation daily  traZODone 50 milliGRAM(s) Oral at bedtime    MEDICATIONS  (PRN):  ALBUTerol    90 MICROgram(s) HFA Inhaler 2 Puff(s) Inhalation every 6 hours PRN Shortness of Breath and/or Wheezing  baclofen 10 milliGRAM(s) Oral three times a day PRN Mild pain  dextrose Gel 1 Dose(s) Oral once PRN Blood Glucose LESS THAN 70 milliGRAM(s)/deciliter  glucagon  Injectable 1 milliGRAM(s) IntraMuscular once PRN Glucose LESS THAN 70 milligrams/deciliter  meclizine 12.5 milliGRAM(s) Oral three times a day PRN Dizziness  morphine  - Injectable 1 milliGRAM(s) IV Push every 4 hours PRN Severe Pain (7 - 10)  ondansetron Injectable 8 milliGRAM(s) IV Push every 8 hours PRN Nausea and/or Vomiting  oxyCODONE    5 mG/acetaminophen 325 mG 2 Tablet(s) Oral every 6 hours PRN Moderate Pain (4 - 6)      PHYSICAL EXAM:    GENERAL: NAD  HEAD:  Atraumatic, Normocephalic  EYES: EOMI, PERRLA, conjunctiva and sclera clear  CHEST/LUNG: Clear to ausculation, bilaterally   HEART: S1S2  ABDOMEN: non distended, +BS, soft, non tender, no guarding  EXTREMITIES:  calf soft, non tender     I&O's Detail      LABS:                        13.2   8.06  )-----------( 241      ( 2018 06:26 )             37.9     -    142  |  107  |  12  ----------------------------<  155<H>  4.1   |  27  |  0.48<L>    Ca    8.4<L>      2018 06:26  Phos  4.4       Mg     2.2         TPro  6.5  /  Alb  3.5  /  TBili  0.4  /  DBili  .12  /  AST  17  /  ALT  40  /  AlkPhos  101        Urinalysis Basic - ( 2018 18:14 )    Color: Yellow / Appearance: Clear / S.010 / pH: x  Gluc: x / Ketone: Negative  / Bili: Negative / Urobili: Negative mg/dL   Blood: x / Protein: Negative mg/dL / Nitrite: Negative   Leuk Esterase: Negative / RBC: x / WBC x   Sq Epi: x / Non Sq Epi: x / Bacteria: x        Impression:    52 year old  female PMHx bipolar 1, schizophrenia, depression, DM, PSHx of c-sections x 2 admitted with cholelithiasis and CBD dilatation possibly secondary to stricture, urolithiasis     Plan:  MRCP -- will follow up result   -GI consult  -pain management consult  -continue medical management /supportive care   prophylactic measure -- Incentive spirometer, venodynes, dvt prophylaxis, oob to ambulate   -Discussed with Dr. Ramos Re:  further intervention once with MRCP result.

## 2018-02-14 NOTE — CONSULT NOTE ADULT - ATTENDING COMMENTS
Patient seen and examined and imaging reviewed with radiology.    I had a long discussion with Ms. Banks regarding the possible etiology of her chronic intermittent right-sided abdominal pain.  I explained that although she does have gallstones, her symptoms are not entirely consistent with that of gallbladder disease.  Recommend MRCP and GI consult to workup biliary dilation and suggested chronic CBD stricture.

## 2018-02-14 NOTE — CONSULT NOTE ADULT - PROBLEM SELECTOR RECOMMENDATION 2
will need surgical consult  full liquid diet doubt acute cholecystitis, HIDA scan   will need surgical f/u   full liquid diet

## 2018-02-15 ENCOUNTER — RESULT REVIEW (OUTPATIENT)
Age: 53
End: 2018-02-15

## 2018-02-15 DIAGNOSIS — K26.9 DUODENAL ULCER, UNSPECIFIED AS ACUTE OR CHRONIC, WITHOUT HEMORRHAGE OR PERFORATION: ICD-10-CM

## 2018-02-15 DIAGNOSIS — K29.90 GASTRODUODENITIS, UNSPECIFIED, WITHOUT BLEEDING: ICD-10-CM

## 2018-02-15 LAB
-  AMIKACIN: SIGNIFICANT CHANGE UP
-  AMPICILLIN/SULBACTAM: SIGNIFICANT CHANGE UP
-  AMPICILLIN: SIGNIFICANT CHANGE UP
-  AZTREONAM: SIGNIFICANT CHANGE UP
-  CEFAZOLIN: SIGNIFICANT CHANGE UP
-  CEFEPIME: SIGNIFICANT CHANGE UP
-  CEFOXITIN: SIGNIFICANT CHANGE UP
-  CEFTAZIDIME: SIGNIFICANT CHANGE UP
-  CEFTRIAXONE: SIGNIFICANT CHANGE UP
-  CIPROFLOXACIN: SIGNIFICANT CHANGE UP
-  ERTAPENEM: SIGNIFICANT CHANGE UP
-  GENTAMICIN: SIGNIFICANT CHANGE UP
-  IMIPENEM: SIGNIFICANT CHANGE UP
-  LEVOFLOXACIN: SIGNIFICANT CHANGE UP
-  MEROPENEM: SIGNIFICANT CHANGE UP
-  NITROFURANTOIN: SIGNIFICANT CHANGE UP
-  PIPERACILLIN/TAZOBACTAM: SIGNIFICANT CHANGE UP
-  TOBRAMYCIN: SIGNIFICANT CHANGE UP
-  TRIMETHOPRIM/SULFAMETHOXAZOLE: SIGNIFICANT CHANGE UP
ALBUMIN SERPL ELPH-MCNC: 3.7 G/DL — SIGNIFICANT CHANGE UP (ref 3.3–5)
ALP SERPL-CCNC: 109 U/L — SIGNIFICANT CHANGE UP (ref 40–120)
ALT FLD-CCNC: 38 U/L — SIGNIFICANT CHANGE UP (ref 12–78)
ANION GAP SERPL CALC-SCNC: 6 MMOL/L — SIGNIFICANT CHANGE UP (ref 5–17)
AST SERPL-CCNC: 18 U/L — SIGNIFICANT CHANGE UP (ref 15–37)
BILIRUB DIRECT SERPL-MCNC: 0.09 MG/DL — SIGNIFICANT CHANGE UP (ref 0.05–0.2)
BILIRUB INDIRECT FLD-MCNC: 0.3 MG/DL — SIGNIFICANT CHANGE UP (ref 0.2–1)
BILIRUB SERPL-MCNC: 0.4 MG/DL — SIGNIFICANT CHANGE UP (ref 0.2–1.2)
BUN SERPL-MCNC: 5 MG/DL — LOW (ref 7–23)
CALCIUM SERPL-MCNC: 9.1 MG/DL — SIGNIFICANT CHANGE UP (ref 8.5–10.1)
CANCER AG19-9 SERPL-ACNC: 22.9 U/ML — SIGNIFICANT CHANGE UP
CHLORIDE SERPL-SCNC: 105 MMOL/L — SIGNIFICANT CHANGE UP (ref 96–108)
CO2 SERPL-SCNC: 30 MMOL/L — SIGNIFICANT CHANGE UP (ref 22–31)
CREAT SERPL-MCNC: 0.43 MG/DL — LOW (ref 0.5–1.3)
CULTURE RESULTS: SIGNIFICANT CHANGE UP
GLUCOSE SERPL-MCNC: 169 MG/DL — HIGH (ref 70–99)
HCT VFR BLD CALC: 40.7 % — SIGNIFICANT CHANGE UP (ref 34.5–45)
HGB BLD-MCNC: 13.9 G/DL — SIGNIFICANT CHANGE UP (ref 11.5–15.5)
MAGNESIUM SERPL-MCNC: 2.1 MG/DL — SIGNIFICANT CHANGE UP (ref 1.6–2.6)
MCHC RBC-ENTMCNC: 29 PG — SIGNIFICANT CHANGE UP (ref 27–34)
MCHC RBC-ENTMCNC: 34.2 GM/DL — SIGNIFICANT CHANGE UP (ref 32–36)
MCV RBC AUTO: 85 FL — SIGNIFICANT CHANGE UP (ref 80–100)
METHOD TYPE: SIGNIFICANT CHANGE UP
NRBC # BLD: 0 /100 WBCS — SIGNIFICANT CHANGE UP (ref 0–0)
ORGANISM # SPEC MICROSCOPIC CNT: SIGNIFICANT CHANGE UP
ORGANISM # SPEC MICROSCOPIC CNT: SIGNIFICANT CHANGE UP
PHOSPHATE SERPL-MCNC: 4.2 MG/DL — SIGNIFICANT CHANGE UP (ref 2.5–4.5)
PLATELET # BLD AUTO: 237 K/UL — SIGNIFICANT CHANGE UP (ref 150–400)
POTASSIUM SERPL-MCNC: 4.1 MMOL/L — SIGNIFICANT CHANGE UP (ref 3.5–5.3)
POTASSIUM SERPL-SCNC: 4.1 MMOL/L — SIGNIFICANT CHANGE UP (ref 3.5–5.3)
PROT SERPL-MCNC: 6.9 GM/DL — SIGNIFICANT CHANGE UP (ref 6–8.3)
RBC # BLD: 4.79 M/UL — SIGNIFICANT CHANGE UP (ref 3.8–5.2)
RBC # FLD: 12.5 % — SIGNIFICANT CHANGE UP (ref 10.3–14.5)
SODIUM SERPL-SCNC: 141 MMOL/L — SIGNIFICANT CHANGE UP (ref 135–145)
SPECIMEN SOURCE: SIGNIFICANT CHANGE UP
WBC # BLD: 7.2 K/UL — SIGNIFICANT CHANGE UP (ref 3.8–10.5)
WBC # FLD AUTO: 7.2 K/UL — SIGNIFICANT CHANGE UP (ref 3.8–10.5)

## 2018-02-15 PROCEDURE — 88312 SPECIAL STAINS GROUP 1: CPT | Mod: 26

## 2018-02-15 PROCEDURE — 88305 TISSUE EXAM BY PATHOLOGIST: CPT | Mod: 26

## 2018-02-15 RX ORDER — FENTANYL CITRATE 50 UG/ML
50 INJECTION INTRAVENOUS
Qty: 0 | Refills: 0 | Status: DISCONTINUED | OUTPATIENT
Start: 2018-02-15 | End: 2018-02-15

## 2018-02-15 RX ORDER — FENTANYL CITRATE 50 UG/ML
25 INJECTION INTRAVENOUS
Qty: 0 | Refills: 0 | Status: DISCONTINUED | OUTPATIENT
Start: 2018-02-15 | End: 2018-02-15

## 2018-02-15 RX ORDER — LISINOPRIL 2.5 MG/1
5 TABLET ORAL DAILY
Qty: 0 | Refills: 0 | Status: DISCONTINUED | OUTPATIENT
Start: 2018-02-15 | End: 2018-02-17

## 2018-02-15 RX ORDER — SUCRALFATE 1 G
1 TABLET ORAL EVERY 6 HOURS
Qty: 0 | Refills: 0 | Status: DISCONTINUED | OUTPATIENT
Start: 2018-02-15 | End: 2018-02-17

## 2018-02-15 RX ORDER — SODIUM CHLORIDE 9 MG/ML
1000 INJECTION, SOLUTION INTRAVENOUS
Qty: 0 | Refills: 0 | Status: DISCONTINUED | OUTPATIENT
Start: 2018-02-15 | End: 2018-02-15

## 2018-02-15 RX ORDER — ACETAMINOPHEN 500 MG
1000 TABLET ORAL ONCE
Qty: 0 | Refills: 0 | Status: COMPLETED | OUTPATIENT
Start: 2018-02-15 | End: 2018-02-15

## 2018-02-15 RX ADMIN — FENTANYL CITRATE 25 MICROGRAM(S): 50 INJECTION INTRAVENOUS at 17:07

## 2018-02-15 RX ADMIN — Medication 1 GRAM(S): at 21:02

## 2018-02-15 RX ADMIN — MORPHINE SULFATE 1 MILLIGRAM(S): 50 CAPSULE, EXTENDED RELEASE ORAL at 12:46

## 2018-02-15 RX ADMIN — Medication 1 PATCH: at 12:36

## 2018-02-15 RX ADMIN — Medication 1 PATCH: at 11:49

## 2018-02-15 RX ADMIN — OXYCODONE AND ACETAMINOPHEN 2 TABLET(S): 5; 325 TABLET ORAL at 21:28

## 2018-02-15 RX ADMIN — ATORVASTATIN CALCIUM 20 MILLIGRAM(S): 80 TABLET, FILM COATED ORAL at 21:28

## 2018-02-15 RX ADMIN — MORPHINE SULFATE 1 MILLIGRAM(S): 50 CAPSULE, EXTENDED RELEASE ORAL at 13:05

## 2018-02-15 RX ADMIN — SODIUM CHLORIDE 100 MILLILITER(S): 9 INJECTION, SOLUTION INTRAVENOUS at 17:07

## 2018-02-15 RX ADMIN — FENTANYL CITRATE 25 MICROGRAM(S): 50 INJECTION INTRAVENOUS at 17:39

## 2018-02-15 RX ADMIN — OXYCODONE AND ACETAMINOPHEN 2 TABLET(S): 5; 325 TABLET ORAL at 06:00

## 2018-02-15 RX ADMIN — Medication 400 MILLIGRAM(S): at 17:07

## 2018-02-15 RX ADMIN — Medication 50 MILLIGRAM(S): at 21:28

## 2018-02-15 RX ADMIN — Medication 1000 MILLIGRAM(S): at 17:39

## 2018-02-15 RX ADMIN — OXYCODONE AND ACETAMINOPHEN 2 TABLET(S): 5; 325 TABLET ORAL at 06:44

## 2018-02-15 RX ADMIN — Medication 2 MILLIGRAM(S): at 21:28

## 2018-02-15 RX ADMIN — TIOTROPIUM BROMIDE 1 CAPSULE(S): 18 CAPSULE ORAL; RESPIRATORY (INHALATION) at 11:49

## 2018-02-15 RX ADMIN — GABAPENTIN 600 MILLIGRAM(S): 400 CAPSULE ORAL at 05:57

## 2018-02-15 RX ADMIN — Medication 2: at 08:02

## 2018-02-15 RX ADMIN — OXCARBAZEPINE 300 MILLIGRAM(S): 300 TABLET, FILM COATED ORAL at 20:18

## 2018-02-15 RX ADMIN — GABAPENTIN 600 MILLIGRAM(S): 400 CAPSULE ORAL at 21:28

## 2018-02-15 RX ADMIN — OXYCODONE AND ACETAMINOPHEN 2 TABLET(S): 5; 325 TABLET ORAL at 22:28

## 2018-02-15 RX ADMIN — OXCARBAZEPINE 300 MILLIGRAM(S): 300 TABLET, FILM COATED ORAL at 05:57

## 2018-02-15 NOTE — PROGRESS NOTE ADULT - SUBJECTIVE AND OBJECTIVE BOX
Patient seen and examined.  Currently denies pain and states that she tolerated full liquids.  She is adamantly refusing going home and having her EGD as an outpatient.    She continues to have a normal WBC = 7.2, with normal LFTs, and remains afebrile.    NAD  Abdomen - soft, non-tender

## 2018-02-15 NOTE — PROGRESS NOTE ADULT - SUBJECTIVE AND OBJECTIVE BOX
Procedure:           Upper GI endoscopy    Indications:           Abdominal pain    Monitored Anesthesia Care Provided by : Tamar TREJO    ____________________________________________________________________________________________________  Procedure:           Pre-Anesthesia Assessment:                       - Prior to the procedure, a History and Physical was performed, and patient                        medications and allergies were reviewed. The patient is competent. The risks                        and benefits of the procedure and the sedation options and risks were                        discussed with the patient. All questions were answered and informed consent                        was obtained. Patient identification and proposed procedure were verified by                        the physician, the nurse and the anesthesiologist in the procedure room.                        Mental Status Examination: alert and oriented. Airway Examination: normal                        oropharyngeal airway and neck mobility. Respiratory Examination: clear to                        auscultation. CV Examination: normal. Prophylactic Antibiotics: The patient                        does not require prophylactic antibiotics.                        Grade Assessment: III - A patient with severe systemic disease. After                        reviewing the risks and benefits, the patient was deemed in satisfactory                        condition to undergo the procedure. The anesthesia plan was to use monitored                        anesthesia care (MAC). Immediately prior to administration of medications,                        the patient was re-assessed for adequacy to receive sedatives. The heart        		     rate, respiratory rate, oxygen saturations, blood pressure, adequacy of                        pulmonary ventilation, and response to care were monitored throughout the                        procedure. The physical status of the patient was re-assessed after the                        procedure.                       After obtaining informed consent, the endoscope was passed under direct                        vision. Throughout the procedure, the patient's blood pressure, pulse, and                        oxygen saturations were monitored continuously. The Endoscope was introduced                        through the mouth, and advanced to the second part of duodenum. The upper GI                        endoscopy was accomplished with ease. The patient tolerated the procedure                        well.    ESOPHAGUS: Normal    STOMACH: Diffuse streaky Gastritis, biopsy of antrum and fundus    DUODENUM: Posterior bulbar ulcer, normal Papilla and to 3rd portion of duodenum

## 2018-02-15 NOTE — DIETITIAN INITIAL EVALUATION ADULT. - OTHER INFO
Pt seen due to MD consult for assessment & education.  Pt reports hx of abdominal pain, nausea c depressed oral intake from her usual intake & wt. loss.  Pt was unsure of amount of wt. loss, as per adm wt., pt c 9.3 LBS/4.2 kg from reported usual wt.  As per diet hx provided, pt consumed doughnut & coffee for breakfast, low sodium ham & cheese for lunch & soup for dinner.  Pt was not self monitoring blood glucose @ home as glucose meter broke a long time ago.  Pt was unaware of HbA1/GC %, unsure of blood glucose goals & diet for diabetes.  As per home meds, pt was on Starlix & Janumet PTA for diabetes. Pt seen due to MD consult for assessment & education.  Pt reports hx of abdominal pain, nausea c depressed oral intake from her usual intake & wt. loss.  Pt was unsure of amount of wt. loss, as per adm wt., pt c 9.3 LBS/4.2 kg from reported usual wt.  As per diet hx provided, pt consumed doughnut & coffee for breakfast, low sodium ham & cheese for lunch & soup for dinner.  Pt was not self monitoring blood glucose @ home as glucose meter broke a long time ago.  Pt was unaware of HbA1/GC %, unsure of blood glucose goals & diet for diabetes.  Pt denied alcohol use PTA & attended alcohol anonymous regularly PTA. As per home meds, pt was on Starlix & Janumet PTA for diabetes.

## 2018-02-15 NOTE — PROGRESS NOTE ADULT - SUBJECTIVE AND OBJECTIVE BOX
Gastroenterology  Patient seen and examined bedside resting comfortably.  c/o RUQ pain, Requesting EGD today    T(F): 98.4 (02-15-18 @ 12:10), Max: 98.6 (02-14-18 @ 23:10)  HR: 103 (02-15-18 @ 12:10) (69 - 103)  BP: 158/88 (02-15-18 @ 12:10) (154/82 - 183/97)  RR: 20 (02-15-18 @ 12:10) (16 - 20)  SpO2: 94% (02-15-18 @ 12:10) (94% - 96%)  Wt(kg): --  CAPILLARY BLOOD GLUCOSE      POCT Blood Glucose.: 176 mg/dL (15 Feb 2018 11:44)  POCT Blood Glucose.: 170 mg/dL (15 Feb 2018 08:01)  POCT Blood Glucose.: 246 mg/dL (14 Feb 2018 21:47)  POCT Blood Glucose.: 153 mg/dL (14 Feb 2018 16:05)      PHYSICAL EXAM:  General: NAD, WDWN.   Neuro:  Alert & responsive  HEENT: NCAT, EOMI, conjunctiva clear  CV: +S1+S2 regular rate and rhythm  Lung: clear to ausculation bilaterally, respirations nonlabored, good inspiratory effort  Abdomen: soft, Non tender, No Distension. Normal active BS  Extremities: no pedal edema or calf tenderness noted     LABS:                        13.9   7.20  )-----------( 237      ( 15 Feb 2018 06:48 )             40.7     02-15    141  |  105  |  5<L>  ----------------------------<  169<H>  4.1   |  30  |  0.43<L>    Ca    9.1      15 Feb 2018 06:48  Phos  4.2     02-15  Mg     2.1     02-15    TPro  6.9  /  Alb  3.7  /  TBili  0.4  /  DBili  .09  /  AST  18  /  ALT  38  /  AlkPhos  109  02-15    LIVER FUNCTIONS - ( 15 Feb 2018 06:48 )  Alb: 3.7 g/dL / Pro: 6.9 gm/dL / ALK PHOS: 109 U/L / ALT: 38 U/L / AST: 18 U/L / GGT: x             I&O's Detail    14 Feb 2018 07:01  -  15 Feb 2018 07:00  --------------------------------------------------------  IN:    dextrose 5% + sodium chloride 0.9%.: 320 mL    Oral Fluid: 300 mL  Total IN: 620 mL    OUT:  Total OUT: 0 mL    Total NET: 620 mL        02-15 @ 06:48    141 | 105 | 5  /9.1 | 2.1 | 4.2  _______________________/  4.1 | 30 | 0.43                           \par

## 2018-02-15 NOTE — PROGRESS NOTE ADULT - PROBLEM SELECTOR PLAN 1
NPO except meds for now.  Mgmt as per surgery/GI  LFTs wnl  Serial abdominal examinations and observations.  Morphine PRN for pain  Zofran PRN for nausea/vomiting

## 2018-02-15 NOTE — PROGRESS NOTE ADULT - SUBJECTIVE AND OBJECTIVE BOX
Patient is a 52y old Female who presents with a chief complaint of RUQ pain (2018 03:02)    HPI:  52 year old woman with a PMHx notable for asthma, HTN, HLD, panic d/o, DM2, bipolar 2, COPD, lung nodules presents complaining of RUQ pain.  She states that for the last month she has had RUQ pain since a CT scan as outpt found gallstone.  The pain has become increasingly worse so her PMD sent her to the hospital.  She denies fever, chills, nausea, vomiting, diarrhea.    In the ED, CT and US ab show Cholelithiasis as well as CBD dilation.  ED discussed with surgeon who advised admission to medicine for further evaluation.       2/15 SUBJECTIVE & OBJECTIVE: Patient was seen and examined at bedside.  Patient reports continued abdominal pain and is requesting to have cholecystectomy surgery while still in the hospital.  Denies fevers/chills.     PHYSICAL EXAM:  T(C): 36.9 (02-15-18 @ 05:25), Max: 37 (18 @ 23:10)  HR: 82 (02-15-18 @ 05:25) (69 - 85)  BP: 154/82 (02-15-18 @ 05:25) (154/82 - 183/97)  RR: 16 (02-15-18 @ 05:25) (16 - 17)  SpO2: 95% (02-15-18 @ 05:25) (94% - 96%)    GENERAL: NAD, well-groomed, well-developed  HEAD:  Atraumatic, Normocephalic  EYES: EOMI, PERRLA, conjunctiva and sclera clear  ENMT: Moist mucous membranes  NECK: Supple, No JVD  NERVOUS SYSTEM:  Alert & Oriented X3, Motor Strength 5/5 B/L upper and lower extremities; DTRs 2+ intact and symmetric  CHEST/LUNG: Clear to auscultation bilaterally; No rales, rhonchi, wheezing, or rubs  HEART: Regular rate and rhythm; No murmurs, rubs, or gallops  ABDOMEN: Soft, Nontender, Nondistended; Bowel sounds present  EXTREMITIES:  2+ Peripheral Pulses, No clubbing, cyanosis, or edema    MEDICATIONS  (STANDING):  atorvastatin 20 milliGRAM(s) Oral at bedtime  clonazePAM Tablet 2 milliGRAM(s) Oral at bedtime  dextrose 5% + sodium chloride 0.9%. 1000 milliLiter(s) (80 mL/Hr) IV Continuous <Continuous>  dextrose 5%. 1000 milliLiter(s) (50 mL/Hr) IV Continuous <Continuous>  dextrose 50% Injectable 12.5 Gram(s) IV Push once  dextrose 50% Injectable 25 Gram(s) IV Push once  dextrose 50% Injectable 25 Gram(s) IV Push once  gabapentin 600 milliGRAM(s) Oral three times a day  insulin lispro (HumaLOG) corrective regimen sliding scale   SubCutaneous three times a day before meals  insulin lispro (HumaLOG) corrective regimen sliding scale   SubCutaneous at bedtime  multivitamin 1 Tablet(s) Oral daily  nicotine - 21 mG/24Hr(s) Patch 1 patch Transdermal daily  OXcarbazepine 300 milliGRAM(s) Oral two times a day  tiotropium 18 MICROgram(s) Capsule 1 Capsule(s) Inhalation daily  traZODone 50 milliGRAM(s) Oral at bedtime    MEDICATIONS  (PRN):  ALBUTerol    90 MICROgram(s) HFA Inhaler 2 Puff(s) Inhalation every 6 hours PRN Shortness of Breath and/or Wheezing  baclofen 10 milliGRAM(s) Oral three times a day PRN Mild pain  dextrose Gel 1 Dose(s) Oral once PRN Blood Glucose LESS THAN 70 milliGRAM(s)/deciliter  diphenhydrAMINE   Injectable 12.5 milliGRAM(s) IV Push every 8 hours PRN itchiness  glucagon  Injectable 1 milliGRAM(s) IntraMuscular once PRN Glucose LESS THAN 70 milligrams/deciliter  meclizine 12.5 milliGRAM(s) Oral three times a day PRN Dizziness  morphine  - Injectable 1 milliGRAM(s) IV Push every 4 hours PRN Severe Pain (7 - 10)  ondansetron Injectable 8 milliGRAM(s) IV Push every 8 hours PRN Nausea and/or Vomiting  oxyCODONE    5 mG/acetaminophen 325 mG 2 Tablet(s) Oral every 6 hours PRN Moderate Pain (4 - 6)      LABS:                        13.9   7.20  )-----------( 237      ( 15 Feb 2018 06:48 )             40.7     02-    141  |  105  |  5<L>  ----------------------------<  169<H>  4.1   |  30  |  0.43<L>    Calcium    9.1      15 Feb 2018 06:48  Phosphorous  4.2     02-15  Magnesium     2.1     02-15    T Protein  6.9  /  Alb  3.7  /  Total Bili  0.4  /  Direct Bili  .09  /  AST  18  /  ALT  38  /  Alk Phos  109  02-15      Urinalysis Basic -    Color: Yellow / Appearance: Clear / S.010 / pH: x  Gluc: x / Ketone: Negative  / Bili: Negative / Urobili: Negative mg/dL   Blood: x / Protein: Negative mg/dL / Nitrite: Negative   Leuk Esterase: Negative / RBC: x / WBC x   Sq Epi: x / Non Sq Epi: x / Bacteria: x    Magnesium, Serum: 2.1 mg/dL (02-15 @ 06:48)    POCT Blood Glucose.: 170 mg/dL (15 Feb 2018 08:01)  POCT Blood Glucose.: 246 mg/dL (2018 21:47)  POCT Blood Glucose.: 153 mg/dL (2018 16:05)  POCT Blood Glucose.: 124 mg/dL (2018 12:06)    POCT Blood Glucose.: 170 mg/dL (15 Feb 2018 08:01)  POCT Blood Glucose.: 246 mg/dL (2018 21:47)  POCT Blood Glucose.: 153 mg/dL (2018 16:05)  POCT Blood Glucose.: 124 mg/dL (2018 12:06)        POCT Blood Glucose.: 170 mg/dL (15 Feb 2018 08:01)    DVT/GI prophylaxsis   Discussed with patient @ bedside

## 2018-02-15 NOTE — DIETITIAN INITIAL EVALUATION ADULT. - NS AS NUTRI INTERV ED CONTENT
Purpose of the nutrition education/Educate pt on low fat, low sodium, CHO controlled diet c meal planning c plate method & blood glucose goals Educate pt on low fat, low sodium, CHO controlled diet c meal planning c plate method & blood glucose goals, provide Directory of ambulatory nutrition services /diabetes wellness program/Purpose of the nutrition education

## 2018-02-15 NOTE — DIETITIAN INITIAL EVALUATION ADULT. - PERTINENT MEDS FT
atorvastatin , lispro corrective regimen sliding scale 3 x day before meals & bedtime , multivitamin, nicotine, clonazepam, oxcarbazepine, Trazadone, meclizine

## 2018-02-15 NOTE — DIETITIAN INITIAL EVALUATION ADULT. - SOURCE
patient/other (specify)/Chart review, pt appeared to have some cognitive deficit , appeared to need simple education

## 2018-02-15 NOTE — DIETITIAN INITIAL EVALUATION ADULT. - NUTRITIONGOAL OUTCOME1
pt verbalizes understanding of low fat, meal planning c plate method & glucose goals/HbA1/GC 7.0 % pt verbalizes understanding of low fat, meal planning c plate method & glucose goals/HbA1/GC <7.0 %

## 2018-02-15 NOTE — DIETITIAN INITIAL EVALUATION ADULT. - PERTINENT LABORATORY DATA
02-15 Na141 mmol/L Glu 169 mg/dL<H> K+ 4.1 mmol/L Cr  0.43 mg/dL<L> BUN 5 mg/dL<L> EstGFR (AA)136 mL/min/1.73M2 EstGFR 118 mL/min/1.73M2 Phos 4.2 mg/dL Ca 9.1 mg/dL Alb 3.7 g/dL PAB n/a   BNP n/a   Hgb 13.9 g/dL Hct 40.7 %

## 2018-02-15 NOTE — DIETITIAN INITIAL EVALUATION ADULT. - PHYSICAL APPEARANCE
well nourished/BMI= 29.8(02-14)/obese BMI= 29.8(02-14), Nutrition focused physical exam conducted; Subcutaneous fat Exam;  [ WNL ]  Orbital fat pads region,  [ unable/ pt w/o upper dentures in place ]Buccal fat region,  [  WNL]triceps region, [ WNL ]ribs region.  Muscle Exam; [ WNL ]temples region, [ WNL ]clavicle region, [ WNL ]shoulder region, [WNL  ]Scapula region, [ WNL ]Interosseous region, [ WNL ]thigh region, [ WNL ]Calf region/well nourished/obese

## 2018-02-15 NOTE — DIETITIAN INITIAL EVALUATION ADULT. - NS AS NUTRI INTERV MEALS SNACK
Carbohydrate - modified diet/Recommend advance diet as tolerated to low fat, low sodium, consistent carbohydrate no snack/Fat - modified diet/Mineral - modified diet

## 2018-02-15 NOTE — DIETITIAN INITIAL EVALUATION ADULT. - ENERGY NEEDS
Height (cm): 160.02 (02-14)  Weight (kg): 76.4 (02-14)  BMI (kg/m2): 29.8 (02-14)  IBW: 52.1 kg      % IBW:  146%           UBW:              %UBW Height (cm): 160.02 (02-14)  Weight (kg): 76.4 (02-14)  BMI (kg/m2): 29.8 (02-14)  IBW: 52.1 kg      % IBW:  146%           UBW:  80.7 kg          %UBW: 94%

## 2018-02-15 NOTE — PROGRESS NOTE ADULT - PROBLEM SELECTOR PLAN 1
MRCP negative for choledocholithiasis  Risks, benefits and alternatives discussed at length with patient  and informed consent obtained for EGD. All questions were answered.

## 2018-02-16 LAB
ALBUMIN SERPL ELPH-MCNC: 3.6 G/DL — SIGNIFICANT CHANGE UP (ref 3.3–5)
ALP SERPL-CCNC: 102 U/L — SIGNIFICANT CHANGE UP (ref 40–120)
ALT FLD-CCNC: 32 U/L — SIGNIFICANT CHANGE UP (ref 12–78)
ANION GAP SERPL CALC-SCNC: 7 MMOL/L — SIGNIFICANT CHANGE UP (ref 5–17)
AST SERPL-CCNC: 13 U/L — LOW (ref 15–37)
BILIRUB DIRECT SERPL-MCNC: 0.12 MG/DL — SIGNIFICANT CHANGE UP (ref 0.05–0.2)
BILIRUB INDIRECT FLD-MCNC: 0.5 MG/DL — SIGNIFICANT CHANGE UP (ref 0.2–1)
BILIRUB SERPL-MCNC: 0.6 MG/DL — SIGNIFICANT CHANGE UP (ref 0.2–1.2)
BUN SERPL-MCNC: 6 MG/DL — LOW (ref 7–23)
CALCIUM SERPL-MCNC: 8.9 MG/DL — SIGNIFICANT CHANGE UP (ref 8.5–10.1)
CHLORIDE SERPL-SCNC: 104 MMOL/L — SIGNIFICANT CHANGE UP (ref 96–108)
CO2 SERPL-SCNC: 28 MMOL/L — SIGNIFICANT CHANGE UP (ref 22–31)
CREAT SERPL-MCNC: 0.41 MG/DL — LOW (ref 0.5–1.3)
GLUCOSE SERPL-MCNC: 164 MG/DL — HIGH (ref 70–99)
HCT VFR BLD CALC: 40.8 % — SIGNIFICANT CHANGE UP (ref 34.5–45)
HGB BLD-MCNC: 14.3 G/DL — SIGNIFICANT CHANGE UP (ref 11.5–15.5)
MAGNESIUM SERPL-MCNC: 2 MG/DL — SIGNIFICANT CHANGE UP (ref 1.6–2.6)
MCHC RBC-ENTMCNC: 29.6 PG — SIGNIFICANT CHANGE UP (ref 27–34)
MCHC RBC-ENTMCNC: 35 GM/DL — SIGNIFICANT CHANGE UP (ref 32–36)
MCV RBC AUTO: 84.5 FL — SIGNIFICANT CHANGE UP (ref 80–100)
NRBC # BLD: 0 /100 WBCS — SIGNIFICANT CHANGE UP (ref 0–0)
PHOSPHATE SERPL-MCNC: 3.9 MG/DL — SIGNIFICANT CHANGE UP (ref 2.5–4.5)
PLATELET # BLD AUTO: 243 K/UL — SIGNIFICANT CHANGE UP (ref 150–400)
POTASSIUM SERPL-MCNC: 4 MMOL/L — SIGNIFICANT CHANGE UP (ref 3.5–5.3)
POTASSIUM SERPL-SCNC: 4 MMOL/L — SIGNIFICANT CHANGE UP (ref 3.5–5.3)
PROT SERPL-MCNC: 6.8 GM/DL — SIGNIFICANT CHANGE UP (ref 6–8.3)
RBC # BLD: 4.83 M/UL — SIGNIFICANT CHANGE UP (ref 3.8–5.2)
RBC # FLD: 12.2 % — SIGNIFICANT CHANGE UP (ref 10.3–14.5)
SODIUM SERPL-SCNC: 139 MMOL/L — SIGNIFICANT CHANGE UP (ref 135–145)
WBC # BLD: 8.01 K/UL — SIGNIFICANT CHANGE UP (ref 3.8–10.5)
WBC # FLD AUTO: 8.01 K/UL — SIGNIFICANT CHANGE UP (ref 3.8–10.5)

## 2018-02-16 PROCEDURE — 78226 HEPATOBILIARY SYSTEM IMAGING: CPT | Mod: 26

## 2018-02-16 PROCEDURE — 99233 SBSQ HOSP IP/OBS HIGH 50: CPT

## 2018-02-16 RX ORDER — SODIUM CHLORIDE 9 MG/ML
1000 INJECTION INTRAMUSCULAR; INTRAVENOUS; SUBCUTANEOUS
Qty: 0 | Refills: 0 | Status: DISCONTINUED | OUTPATIENT
Start: 2018-02-16 | End: 2018-02-17

## 2018-02-16 RX ORDER — MORPHINE SULFATE 50 MG/1
3 CAPSULE, EXTENDED RELEASE ORAL ONCE
Qty: 0 | Refills: 0 | Status: DISCONTINUED | OUTPATIENT
Start: 2018-02-16 | End: 2018-02-16

## 2018-02-16 RX ORDER — DOCUSATE SODIUM 100 MG
100 CAPSULE ORAL
Qty: 0 | Refills: 0 | Status: DISCONTINUED | OUTPATIENT
Start: 2018-02-16 | End: 2018-02-17

## 2018-02-16 RX ORDER — SENNA PLUS 8.6 MG/1
2 TABLET ORAL AT BEDTIME
Qty: 0 | Refills: 0 | Status: DISCONTINUED | OUTPATIENT
Start: 2018-02-16 | End: 2018-02-17

## 2018-02-16 RX ADMIN — LISINOPRIL 5 MILLIGRAM(S): 2.5 TABLET ORAL at 05:48

## 2018-02-16 RX ADMIN — OXCARBAZEPINE 300 MILLIGRAM(S): 300 TABLET, FILM COATED ORAL at 05:48

## 2018-02-16 RX ADMIN — Medication 1 GRAM(S): at 23:36

## 2018-02-16 RX ADMIN — Medication 2: at 09:16

## 2018-02-16 RX ADMIN — MORPHINE SULFATE 1 MILLIGRAM(S): 50 CAPSULE, EXTENDED RELEASE ORAL at 23:35

## 2018-02-16 RX ADMIN — ATORVASTATIN CALCIUM 20 MILLIGRAM(S): 80 TABLET, FILM COATED ORAL at 23:33

## 2018-02-16 RX ADMIN — Medication 50 MILLIGRAM(S): at 23:35

## 2018-02-16 RX ADMIN — LISINOPRIL 5 MILLIGRAM(S): 2.5 TABLET ORAL at 00:03

## 2018-02-16 RX ADMIN — Medication 1 GRAM(S): at 00:03

## 2018-02-16 RX ADMIN — Medication 4: at 18:43

## 2018-02-16 RX ADMIN — MORPHINE SULFATE 3 MILLIGRAM(S): 50 CAPSULE, EXTENDED RELEASE ORAL at 13:36

## 2018-02-16 RX ADMIN — Medication 2: at 11:13

## 2018-02-16 RX ADMIN — OXYCODONE AND ACETAMINOPHEN 2 TABLET(S): 5; 325 TABLET ORAL at 19:09

## 2018-02-16 RX ADMIN — GABAPENTIN 600 MILLIGRAM(S): 400 CAPSULE ORAL at 05:48

## 2018-02-16 RX ADMIN — Medication 1 TABLET(S): at 11:09

## 2018-02-16 RX ADMIN — GABAPENTIN 600 MILLIGRAM(S): 400 CAPSULE ORAL at 23:34

## 2018-02-16 RX ADMIN — SODIUM CHLORIDE 75 MILLILITER(S): 9 INJECTION INTRAMUSCULAR; INTRAVENOUS; SUBCUTANEOUS at 23:34

## 2018-02-16 RX ADMIN — Medication 1 GRAM(S): at 05:48

## 2018-02-16 RX ADMIN — MORPHINE SULFATE 1 MILLIGRAM(S): 50 CAPSULE, EXTENDED RELEASE ORAL at 04:02

## 2018-02-16 RX ADMIN — OXYCODONE AND ACETAMINOPHEN 2 TABLET(S): 5; 325 TABLET ORAL at 18:31

## 2018-02-16 RX ADMIN — OXYCODONE AND ACETAMINOPHEN 2 TABLET(S): 5; 325 TABLET ORAL at 11:09

## 2018-02-16 RX ADMIN — Medication 1 PATCH: at 11:14

## 2018-02-16 RX ADMIN — Medication 1 GRAM(S): at 11:09

## 2018-02-16 RX ADMIN — MORPHINE SULFATE 1 MILLIGRAM(S): 50 CAPSULE, EXTENDED RELEASE ORAL at 03:34

## 2018-02-16 RX ADMIN — Medication 1 GRAM(S): at 18:39

## 2018-02-16 RX ADMIN — Medication 12.5 MILLIGRAM(S): at 23:37

## 2018-02-16 RX ADMIN — Medication 2 MILLIGRAM(S): at 23:34

## 2018-02-16 NOTE — PROGRESS NOTE ADULT - SUBJECTIVE AND OBJECTIVE BOX
INTERVAL HPI/OVERNIGHT EVENTS:  Pt seen and examined at bedside.     Allergies/Intolerance: No Known Allergies      MEDICATIONS  (STANDING):  atorvastatin 20 milliGRAM(s) Oral at bedtime  clonazePAM Tablet 2 milliGRAM(s) Oral at bedtime  dextrose 5%. 1000 milliLiter(s) (50 mL/Hr) IV Continuous <Continuous>  dextrose 50% Injectable 12.5 Gram(s) IV Push once  dextrose 50% Injectable 25 Gram(s) IV Push once  dextrose 50% Injectable 25 Gram(s) IV Push once  gabapentin 600 milliGRAM(s) Oral three times a day  insulin lispro (HumaLOG) corrective regimen sliding scale   SubCutaneous three times a day before meals  insulin lispro (HumaLOG) corrective regimen sliding scale   SubCutaneous at bedtime  lisinopril 5 milliGRAM(s) Oral daily  multivitamin 1 Tablet(s) Oral daily  nicotine - 21 mG/24Hr(s) Patch 1 patch Transdermal daily  OXcarbazepine 300 milliGRAM(s) Oral two times a day  sucralfate 1 Gram(s) Oral every 6 hours  tiotropium 18 MICROgram(s) Capsule 1 Capsule(s) Inhalation daily  traZODone 50 milliGRAM(s) Oral at bedtime    MEDICATIONS  (PRN):  ALBUTerol    90 MICROgram(s) HFA Inhaler 2 Puff(s) Inhalation every 6 hours PRN Shortness of Breath and/or Wheezing  baclofen 10 milliGRAM(s) Oral three times a day PRN Mild pain  dextrose Gel 1 Dose(s) Oral once PRN Blood Glucose LESS THAN 70 milliGRAM(s)/deciliter  diphenhydrAMINE   Injectable 12.5 milliGRAM(s) IV Push every 8 hours PRN itchiness  glucagon  Injectable 1 milliGRAM(s) IntraMuscular once PRN Glucose LESS THAN 70 milligrams/deciliter  meclizine 12.5 milliGRAM(s) Oral three times a day PRN Dizziness  morphine  - Injectable 1 milliGRAM(s) IV Push every 4 hours PRN Severe Pain (7 - 10)  oxyCODONE    5 mG/acetaminophen 325 mG 2 Tablet(s) Oral every 6 hours PRN Moderate Pain (4 - 6)        ROS: all systems reviewed and wnl      PHYSICAL EXAMINATION:  Vital Signs Last 24 Hrs  T(C): 36.8 (16 Feb 2018 11:29), Max: 36.8 (15 Feb 2018 16:50)  T(F): 98.3 (16 Feb 2018 11:29), Max: 98.3 (15 Feb 2018 16:50)  HR: 116 (16 Feb 2018 11:29) (71 - 116)  BP: 135/89 (16 Feb 2018 11:29) (135/89 - 182/99)  BP(mean): --  RR: 19 (16 Feb 2018 11:29) (16 - 19)  SpO2: 94% (16 Feb 2018 11:29) (94% - 98%)  CAPILLARY BLOOD GLUCOSE      POCT Blood Glucose.: 170 mg/dL (16 Feb 2018 11:11)  POCT Blood Glucose.: 178 mg/dL (16 Feb 2018 09:15)  POCT Blood Glucose.: 205 mg/dL (15 Feb 2018 21:30)  POCT Blood Glucose.: 181 mg/dL (15 Feb 2018 19:29)      02-15 @ 07:01  -  02-16 @ 07:00  --------------------------------------------------------  IN: 0 mL / OUT: 300 mL / NET: -300 mL        GENERAL:   NECK: supple, No JVD  CHEST/LUNG: clear to auscultation bilaterally; no rales, rhonchi, or wheezing b/l  HEART: normal S1, S2  ABDOMEN: BS+, soft, ND, NT   EXTREMITIES:  pulses palpable; no clubbing, cyanosis, or edema b/l LEs  SKIN: no rashes or lesions      LABS:                        14.3   8.01  )-----------( 243      ( 16 Feb 2018 08:04 )             40.8     02-16    139  |  104  |  6<L>  ----------------------------<  164<H>  4.0   |  28  |  0.41<L>    Ca    8.9      16 Feb 2018 08:04  Phos  3.9     02-16  Mg     2.0     02-16    TPro  6.8  /  Alb  3.6  /  TBili  0.6  /  DBili  .12  /  AST  13<L>  /  ALT  32  /  AlkPhos  102  02-16 INTERVAL HPI/OVERNIGHT EVENTS:  Pt seen and examined at bedside.     Allergies/Intolerance: No Known Allergies      MEDICATIONS  (STANDING):  atorvastatin 20 milliGRAM(s) Oral at bedtime  clonazePAM Tablet 2 milliGRAM(s) Oral at bedtime  dextrose 5%. 1000 milliLiter(s) (50 mL/Hr) IV Continuous <Continuous>  dextrose 50% Injectable 12.5 Gram(s) IV Push once  dextrose 50% Injectable 25 Gram(s) IV Push once  dextrose 50% Injectable 25 Gram(s) IV Push once  gabapentin 600 milliGRAM(s) Oral three times a day  insulin lispro (HumaLOG) corrective regimen sliding scale   SubCutaneous three times a day before meals  insulin lispro (HumaLOG) corrective regimen sliding scale   SubCutaneous at bedtime  lisinopril 5 milliGRAM(s) Oral daily  multivitamin 1 Tablet(s) Oral daily  nicotine - 21 mG/24Hr(s) Patch 1 patch Transdermal daily  OXcarbazepine 300 milliGRAM(s) Oral two times a day  sucralfate 1 Gram(s) Oral every 6 hours  tiotropium 18 MICROgram(s) Capsule 1 Capsule(s) Inhalation daily  traZODone 50 milliGRAM(s) Oral at bedtime    MEDICATIONS  (PRN):  ALBUTerol    90 MICROgram(s) HFA Inhaler 2 Puff(s) Inhalation every 6 hours PRN Shortness of Breath and/or Wheezing  baclofen 10 milliGRAM(s) Oral three times a day PRN Mild pain  dextrose Gel 1 Dose(s) Oral once PRN Blood Glucose LESS THAN 70 milliGRAM(s)/deciliter  diphenhydrAMINE   Injectable 12.5 milliGRAM(s) IV Push every 8 hours PRN itchiness  glucagon  Injectable 1 milliGRAM(s) IntraMuscular once PRN Glucose LESS THAN 70 milligrams/deciliter  meclizine 12.5 milliGRAM(s) Oral three times a day PRN Dizziness  morphine  - Injectable 1 milliGRAM(s) IV Push every 4 hours PRN Severe Pain (7 - 10)  oxyCODONE    5 mG/acetaminophen 325 mG 2 Tablet(s) Oral every 6 hours PRN Moderate Pain (4 - 6)        ROS: all systems reviewed and wnl      PHYSICAL EXAMINATION:  Vital Signs Last 24 Hrs  T(C): 36.8 (16 Feb 2018 11:29), Max: 36.8 (15 Feb 2018 16:50)  T(F): 98.3 (16 Feb 2018 11:29), Max: 98.3 (15 Feb 2018 16:50)  HR: 116 (16 Feb 2018 11:29) (71 - 116)  BP: 135/89 (16 Feb 2018 11:29) (135/89 - 182/99)  BP(mean): --  RR: 19 (16 Feb 2018 11:29) (16 - 19)  SpO2: 94% (16 Feb 2018 11:29) (94% - 98%)  CAPILLARY BLOOD GLUCOSE      POCT Blood Glucose.: 170 mg/dL (16 Feb 2018 11:11)  POCT Blood Glucose.: 178 mg/dL (16 Feb 2018 09:15)  POCT Blood Glucose.: 205 mg/dL (15 Feb 2018 21:30)  POCT Blood Glucose.: 181 mg/dL (15 Feb 2018 19:29)      02-15 @ 07:01  -  02-16 @ 07:00  --------------------------------------------------------  IN: 0 mL / OUT: 300 mL / NET: -300 mL        GENERAL: stable, seen at HIDA scan, comfortable, no nausea or vomiting  NECK: supple, No JVD  CHEST/LUNG: clear to auscultation bilaterally; no rales, rhonchi, or wheezing b/l  HEART: normal S1, S2  ABDOMEN: BS+, soft, ND, NT   EXTREMITIES:  pulses palpable; no clubbing, cyanosis, or edema b/l LEs  SKIN: no rashes or lesions      LABS:                        14.3   8.01  )-----------( 243      ( 16 Feb 2018 08:04 )             40.8     02-16    139  |  104  |  6<L>  ----------------------------<  164<H>  4.0   |  28  |  0.41<L>    Ca    8.9      16 Feb 2018 08:04  Phos  3.9     02-16  Mg     2.0     02-16    TPro  6.8  /  Alb  3.6  /  TBili  0.6  /  DBili  .12  /  AST  13<L>  /  ALT  32  /  AlkPhos  102  02-16

## 2018-02-16 NOTE — PROGRESS NOTE ADULT - ATTENDING COMMENTS
Patient seen and examined.  EGD from yesterday revealed gastritis and a duodenal ulcer.  HIDA scan from today revealed nonvisualization of the gallbladder.  Considering the chronicity of her symptoms, normal labs/vitals, and otherwise normal appearance of her gallbladder on imaging, the HIDA scan findings likely represent a chronic cystic duct obstruction.  As such, would recommend an elective cholecystectomy.  The patient is requesting to have her surgery, however, done during this admission.  I explained to Ms. Banks that typically elective surgery is contraindicated in the setting of an acute ulcer.  I then discussed this with Dr. Floyd who explained that her ulcer appears mostly healed and feels that she should be able to tolerate an elective surgery.      Will therefore plan for a laparoscopic cholecystectomy tomorrow pending medical clearance.    The risks of surgery, including but not limited to, bleeding, infection, damage to surrounding structures (including bile duct injury), conversion to open surgery, persistence of symptoms, hernia formation, and complications related to her known duodenal ulcer/gastritis (including worsening of symptoms and perforation) were discussed with the patient who understands these risks and would like to proceed with surgery.  All questions were answered.    Please keep NPO after midnight.

## 2018-02-16 NOTE — PROGRESS NOTE ADULT - SUBJECTIVE AND OBJECTIVE BOX
INTERVAL HPI/OVERNIGHT EVENTS:  Pt. seen and examined at bedside. Patient complains of persistent RUQ pain with radiation to the back. Requesting for pain medication. Was not able to drink liquids this morning due to nausea, denies vomiting. Patient was placed NPO by GI this morning, denies nausea at present. +Flatus. Denies BM x 4 days. Ambulating. Voiding often, denies dysuria.  Denies fever/chills, chest pain, dyspnea, cough, dizziness.     Vital Signs Last 24 Hrs  T(C): 36.8 (16 Feb 2018 11:29), Max: 36.9 (15 Feb 2018 12:10)  T(F): 98.3 (16 Feb 2018 11:29), Max: 98.4 (15 Feb 2018 12:10)  HR: 116 (16 Feb 2018 11:29) (71 - 116)  BP: 135/89 (16 Feb 2018 11:29) (135/89 - 182/99)  RR: 19 (16 Feb 2018 11:29) (16 - 20)  SpO2: 94% (16 Feb 2018 11:29) (94% - 98%)    PHYSICAL EXAM:    GENERAL: A&O x3. WD/WN. Mild distress due to pain initially but later calms down  HEAD:  Atraumatic, Normocephalic  EYES: EOMI, PERRLA, conjunctiva and sclera clear  CHEST/LUNG: Clear to ausculation, bilaterally   HEART: S1S2, tachycardic, regular   ABDOMEN: non distended, +BS, soft, +Moderate RUQ tenderness with voluntary guarding, no rebound  EXTREMITIES:  calf soft, non tender b/l. 2+ distal pulses b/l.     I&O's Detail    15 Feb 2018 07:01  -  16 Feb 2018 07:00  --------------------------------------------------------  IN:  Total IN: 0 mL    OUT:    Voided: 300 mL  Total OUT: 300 mL    Total NET: -300 mL    LABS:                        14.3   8.01  )-----------( 243      ( 16 Feb 2018 08:04 )             40.8     02-16    139  |  104  |  6<L>  ----------------------------<  164<H>  4.0   |  28  |  0.41<L>    Ca    8.9      16 Feb 2018 08:04  Phos  3.9     02-16  Mg     2.0     02-16    TPro  6.8  /  Alb  3.6  /  TBili  0.6  /  DBili  .12  /  AST  13<L>  /  ALT  32  /  AlkPhos  102  02-16    Culture Results:   50,000 - 99,000 CFU/mL Escherichia coli (02-14 @ 00:25)    Assessment: 52-year-old Female PMHx bipolar 1, schizophrenia, depression, DM admitted with chronic intermittent right-sided abdominal pain in the setting of cholelithiasis and a dilated CBD.   S/p EGD 2/15 which showed a Posterior bulbar ulcer and  diffuse streaky Gastritis    Plan:  -Appreciate GI follow up-- continue Carafate/protonix-- Patient placed NPO, HIDA scan ordered  -pain control  -IVF, DVT ppx, OOB to ambulate  -cont. medical management and supportive care  -Lap renata planning pending results of HIDA scan  -f/u am labs  -Will discuss with Dr. Louise

## 2018-02-16 NOTE — PROGRESS NOTE ADULT - SUBJECTIVE AND OBJECTIVE BOX
Gastroenterology  Patient seen and examined bedside resting comfortably.  complaints of RUQ pain and "swelling".   Denies nausea and vomiting. on diet minimal ingestion.    T(F): 97.3 (02-16-18 @ 05:22), Max: 98.4 (02-15-18 @ 12:10)  HR: 87 (02-16-18 @ 05:22) (71 - 103)  BP: 148/79 (02-16-18 @ 05:22) (145/91 - 182/99)  RR: 17 (02-16-18 @ 05:22) (16 - 20)  SpO2: 96% (02-16-18 @ 05:22) (94% - 98%)  Wt(kg): --  CAPILLARY BLOOD GLUCOSE      POCT Blood Glucose.: 205 mg/dL (15 Feb 2018 21:30)  POCT Blood Glucose.: 181 mg/dL (15 Feb 2018 19:29)  POCT Blood Glucose.: 176 mg/dL (15 Feb 2018 11:44)  POCT Blood Glucose.: 170 mg/dL (15 Feb 2018 08:01)      PHYSICAL EXAM:  General: NAD, WDWN.   Neuro:  Alert & oriented x 3  HEENT: NCAT, EOMI, conjunctiva clear  CV: +S1+S2 regular rate and rhythm  Lung: clear to ausculation bilaterally, respirations nonlabored, good inspiratory effort  Abdomen: soft, mildly obese +RUQ Tender, No distention Normal active BS  Extremities: no cyanosis, clubbing or edema    LABS:                        13.9   7.20  )-----------( 237      ( 15 Feb 2018 06:48 )             40.7     02-15    141  |  105  |  5<L>  ----------------------------<  169<H>  4.1   |  30  |  0.43<L>    Ca    9.1      15 Feb 2018 06:48  Phos  4.2     02-15  Mg     2.1     02-15    TPro  6.9  /  Alb  3.7  /  TBili  0.4  /  DBili  .09  /  AST  18  /  ALT  38  /  AlkPhos  109  02-15    LIVER FUNCTIONS - ( 15 Feb 2018 06:48 )  Alb: 3.7 g/dL / Pro: 6.9 gm/dL / ALK PHOS: 109 U/L / ALT: 38 U/L / AST: 18 U/L / GGT: x             I&O's Detail    15 Feb 2018 07:01  -  16 Feb 2018 07:00  --------------------------------------------------------  IN:  Total IN: 0 mL    OUT:    Voided: 300 mL  Total OUT: 300 mL    Total NET: -300 mL        02-15 @ 06:48    141 | 105 | 5  /9.1 | 2.1 | 4.2  _______________________/  4.1 | 30 | 0.43                           \par

## 2018-02-16 NOTE — CHART NOTE - NSCHARTNOTEFT_GEN_A_CORE
HPI:  52 year old woman with PMH of asthma, HTN, HLD, panic d/o, DM2, bipolar 2, COPD, lung nodules presents complaining of RUQ pain. pt on hid w/signs of cholecystitis, for cholecystectomy in morning.  pt states prior to sx's would walk 1-2 miles qd w/o sx's, no prior cardiac issues or w/u and no family cardiac hx.  pt currently only w/complain of abdominal pain.    PAST MEDICAL & SURGICAL HISTORY:  Depressed  Schizophrenia  Bipolar 1 disorder  DM (diabetes mellitus)  ETOH abuse  Crack cocaine use: 11 years clean  No significant past surgical history      REVIEW OF SYSTEMS:    CONSTITUTIONAL: No fever, weight loss, or fatigue  EYES: No eye pain, visual disturbances, or discharge  ENMT:  No difficulty hearing, tinnitus, vertigo; No sinus or throat pain  NECK: No pain or stiffness  BREASTS: No pain, masses, or nipple discharge  RESPIRATORY: No cough, wheezing, chills or hemoptysis; No shortness of breath  CARDIOVASCULAR: No chest pain, palpitations, dizziness, or leg swelling  GASTROINTESTINAL: ruq bdominal pain  GENITOURINARY: No dysuria, frequency, hematuria, or incontinence  NEUROLOGICAL: No headaches, memory loss, loss of strength, numbness, or tremors  SKIN: No itching, burning, rashes, or lesions   LYMPH NODES: No enlarged glands  ENDOCRINE: No heat or cold intolerance; No hair loss  MUSCULOSKELETAL: No joint pain or swelling; No muscle, back, or extremity pain  PSYCHIATRIC: No depression, anxiety, mood swings, or difficulty sleeping  HEME/LYMPH: No easy bruising, or bleeding gums  ALLERGY AND IMMUNOLOGIC: No hives or eczema      MEDICATIONS  (STANDING):  atorvastatin 20 milliGRAM(s) Oral at bedtime  clonazePAM Tablet 2 milliGRAM(s) Oral at bedtime  dextrose 5%. 1000 milliLiter(s) (50 mL/Hr) IV Continuous <Continuous>  dextrose 50% Injectable 12.5 Gram(s) IV Push once  dextrose 50% Injectable 25 Gram(s) IV Push once  dextrose 50% Injectable 25 Gram(s) IV Push once  docusate sodium 100 milliGRAM(s) Oral two times a day  gabapentin 600 milliGRAM(s) Oral three times a day  insulin lispro (HumaLOG) corrective regimen sliding scale   SubCutaneous three times a day before meals  insulin lispro (HumaLOG) corrective regimen sliding scale   SubCutaneous at bedtime  lisinopril 5 milliGRAM(s) Oral daily  multivitamin 1 Tablet(s) Oral daily  nicotine - 21 mG/24Hr(s) Patch 1 patch Transdermal daily  OXcarbazepine 300 milliGRAM(s) Oral two times a day  senna 2 Tablet(s) Oral at bedtime  sodium chloride 0.9%. 1000 milliLiter(s) (75 mL/Hr) IV Continuous <Continuous>  sucralfate 1 Gram(s) Oral every 6 hours  tiotropium 18 MICROgram(s) Capsule 1 Capsule(s) Inhalation daily  traZODone 50 milliGRAM(s) Oral at bedtime    MEDICATIONS  (PRN):  ALBUTerol    90 MICROgram(s) HFA Inhaler 2 Puff(s) Inhalation every 6 hours PRN Shortness of Breath and/or Wheezing  baclofen 10 milliGRAM(s) Oral three times a day PRN Mild pain  dextrose Gel 1 Dose(s) Oral once PRN Blood Glucose LESS THAN 70 milliGRAM(s)/deciliter  diphenhydrAMINE   Injectable 12.5 milliGRAM(s) IV Push every 8 hours PRN itchiness  glucagon  Injectable 1 milliGRAM(s) IntraMuscular once PRN Glucose LESS THAN 70 milligrams/deciliter  meclizine 12.5 milliGRAM(s) Oral three times a day PRN Dizziness  morphine  - Injectable 1 milliGRAM(s) IV Push every 4 hours PRN Severe Pain (7 - 10)  oxyCODONE    5 mG/acetaminophen 325 mG 2 Tablet(s) Oral every 6 hours PRN Moderate Pain (4 - 6)      Allergies    No Known Allergies    Intolerances        SOCIAL HISTORY:    FAMILY HISTORY:  No pertinent family history in first degree relatives      Vital Signs Last 24 Hrs  T(C): 36.7 (16 Feb 2018 18:09), Max: 37.1 (16 Feb 2018 17:18)  T(F): 98 (16 Feb 2018 18:09), Max: 98.8 (16 Feb 2018 17:18)  HR: 111 (16 Feb 2018 18:09) (79 - 116)  BP: 136/84 (16 Feb 2018 18:09) (126/67 - 154/86)  BP(mean): --  RR: 18 (16 Feb 2018 18:09) (16 - 19)  SpO2: 97% (16 Feb 2018 18:09) (94% - 98%)    PHYSICAL EXAM:    GENERAL: NAD, well-groomed, well-developed  HEAD:  Atraumatic, Normocephalic  EYES: EOMI, PERRLA, conjunctiva and sclera clear  ENMT: No tonsillar erythema, exudates, or enlargement; Moist mucous membranes,  poor dentition  NECK: Supple, No JVD, Normal thyroid  NERVOUS SYSTEM:  Alert & Oriented X3, Good concentration; Motor Strength 5/5 B/L upper and lower extremities; DTRs 2+ intact and symmetric  CHEST/LUNG: Clear to percussion bilaterally; No rales, rhonchi, wheezing, or rubs  HEART: Regular rate and rhythm; No murmurs, rubs, or gallops  ABDOMEN: Soft, ruq tenderness no rebound or guarding  EXTREMITIES:  2+ Peripheral Pulses, No clubbing, cyanosis, or edema  LYMPH: No lymphadenopathy noted  RECTAL: deferred  BREAST: pt declined.  : deferred  SKIN: No rashes or lesions      LABS:                        14.3   8.01  )-----------( 243      ( 16 Feb 2018 08:04 )             40.8     02-16    139  |  104  |  6<L>  ----------------------------<  164<H>  4.0   |  28  |  0.41<L>    Ca    8.9      16 Feb 2018 08:04  Phos  3.9     02-16  Mg     2.0     02-16    TPro  6.8  /  Alb  3.6  /  TBili  0.6  /  DBili  .12  /  AST  13<L>  /  ALT  32  /  AlkPhos  102  02-16          RADIOLOGY & ADDITIONAL STUDIES:      A/P  52 year old woman with PMH of asthma, HTN, HLD, panic d/o, DM2, bipolar 2, and copd w/cholecystitis on hida for cholecystectomy in am  pt is currently medically optimized for surgery, will hold ACE perioperatively. and resume after

## 2018-02-16 NOTE — PROGRESS NOTE ADULT - PROBLEM SELECTOR PLAN 1
NPO except meds for now. Mgmt as per surgery/GI  LFTs wnl. Serial abdominal examinations and observations.  Morphine PRN for pain.  Zofran PRN for nausea/vomiting.   Await HIDA scan. NPO except meds for now. Mgmt as per surgery/GI  LFTs wnl. Serial abdominal examinations and observations.  Morphine PRN for pain.  Zofran PRN for nausea/vomiting.   Await HIDA scan, to be done today.

## 2018-02-17 ENCOUNTER — TRANSCRIPTION ENCOUNTER (OUTPATIENT)
Age: 53
End: 2018-02-17

## 2018-02-17 ENCOUNTER — RESULT REVIEW (OUTPATIENT)
Age: 53
End: 2018-02-17

## 2018-02-17 LAB
ANION GAP SERPL CALC-SCNC: 9 MMOL/L — SIGNIFICANT CHANGE UP (ref 5–17)
APTT BLD: 29 SEC — SIGNIFICANT CHANGE UP (ref 27.5–37.4)
BLD GP AB SCN SERPL QL: SIGNIFICANT CHANGE UP
BUN SERPL-MCNC: 7 MG/DL — SIGNIFICANT CHANGE UP (ref 7–23)
CALCIUM SERPL-MCNC: 9.2 MG/DL — SIGNIFICANT CHANGE UP (ref 8.5–10.1)
CHLORIDE SERPL-SCNC: 104 MMOL/L — SIGNIFICANT CHANGE UP (ref 96–108)
CO2 SERPL-SCNC: 28 MMOL/L — SIGNIFICANT CHANGE UP (ref 22–31)
CREAT SERPL-MCNC: 0.4 MG/DL — LOW (ref 0.5–1.3)
GLUCOSE SERPL-MCNC: 167 MG/DL — HIGH (ref 70–99)
HCT VFR BLD CALC: 40.8 % — SIGNIFICANT CHANGE UP (ref 34.5–45)
HGB BLD-MCNC: 14.1 G/DL — SIGNIFICANT CHANGE UP (ref 11.5–15.5)
INR BLD: 1.03 RATIO — SIGNIFICANT CHANGE UP (ref 0.88–1.16)
MAGNESIUM SERPL-MCNC: 2 MG/DL — SIGNIFICANT CHANGE UP (ref 1.6–2.6)
MCHC RBC-ENTMCNC: 28.9 PG — SIGNIFICANT CHANGE UP (ref 27–34)
MCHC RBC-ENTMCNC: 34.6 GM/DL — SIGNIFICANT CHANGE UP (ref 32–36)
MCV RBC AUTO: 83.6 FL — SIGNIFICANT CHANGE UP (ref 80–100)
NRBC # BLD: 0 /100 WBCS — SIGNIFICANT CHANGE UP (ref 0–0)
PHOSPHATE SERPL-MCNC: 3.9 MG/DL — SIGNIFICANT CHANGE UP (ref 2.5–4.5)
PLATELET # BLD AUTO: 254 K/UL — SIGNIFICANT CHANGE UP (ref 150–400)
POTASSIUM SERPL-MCNC: 3.7 MMOL/L — SIGNIFICANT CHANGE UP (ref 3.5–5.3)
POTASSIUM SERPL-SCNC: 3.7 MMOL/L — SIGNIFICANT CHANGE UP (ref 3.5–5.3)
PROTHROM AB SERPL-ACNC: 11.2 SEC — SIGNIFICANT CHANGE UP (ref 9.8–12.7)
RBC # BLD: 4.88 M/UL — SIGNIFICANT CHANGE UP (ref 3.8–5.2)
RBC # FLD: 12.5 % — SIGNIFICANT CHANGE UP (ref 10.3–14.5)
SODIUM SERPL-SCNC: 141 MMOL/L — SIGNIFICANT CHANGE UP (ref 135–145)
WBC # BLD: 9.65 K/UL — SIGNIFICANT CHANGE UP (ref 3.8–10.5)
WBC # FLD AUTO: 9.65 K/UL — SIGNIFICANT CHANGE UP (ref 3.8–10.5)

## 2018-02-17 PROCEDURE — 88304 TISSUE EXAM BY PATHOLOGIST: CPT | Mod: 26

## 2018-02-17 PROCEDURE — 47562 LAPAROSCOPIC CHOLECYSTECTOMY: CPT | Mod: AS

## 2018-02-17 PROCEDURE — 99233 SBSQ HOSP IP/OBS HIGH 50: CPT

## 2018-02-17 RX ORDER — DEXTROSE 50 % IN WATER 50 %
1 SYRINGE (ML) INTRAVENOUS ONCE
Qty: 0 | Refills: 0 | Status: DISCONTINUED | OUTPATIENT
Start: 2018-02-17 | End: 2018-02-23

## 2018-02-17 RX ORDER — ALBUTEROL 90 UG/1
2 AEROSOL, METERED ORAL EVERY 6 HOURS
Qty: 0 | Refills: 0 | Status: DISCONTINUED | OUTPATIENT
Start: 2018-02-17 | End: 2018-02-23

## 2018-02-17 RX ORDER — MORPHINE SULFATE 50 MG/1
2 CAPSULE, EXTENDED RELEASE ORAL EVERY 4 HOURS
Qty: 0 | Refills: 0 | Status: DISCONTINUED | OUTPATIENT
Start: 2018-02-17 | End: 2018-02-17

## 2018-02-17 RX ORDER — HEPARIN SODIUM 5000 [USP'U]/ML
5000 INJECTION INTRAVENOUS; SUBCUTANEOUS EVERY 12 HOURS
Qty: 0 | Refills: 0 | Status: DISCONTINUED | OUTPATIENT
Start: 2018-02-17 | End: 2018-02-23

## 2018-02-17 RX ORDER — OXYCODONE AND ACETAMINOPHEN 5; 325 MG/1; MG/1
1 TABLET ORAL EVERY 4 HOURS
Qty: 0 | Refills: 0 | Status: DISCONTINUED | OUTPATIENT
Start: 2018-02-17 | End: 2018-02-23

## 2018-02-17 RX ORDER — TRAZODONE HCL 50 MG
50 TABLET ORAL AT BEDTIME
Qty: 0 | Refills: 0 | Status: DISCONTINUED | OUTPATIENT
Start: 2018-02-17 | End: 2018-02-23

## 2018-02-17 RX ORDER — ACETAMINOPHEN 500 MG
1000 TABLET ORAL ONCE
Qty: 0 | Refills: 0 | Status: COMPLETED | OUTPATIENT
Start: 2018-02-17 | End: 2018-02-17

## 2018-02-17 RX ORDER — SODIUM CHLORIDE 9 MG/ML
1000 INJECTION, SOLUTION INTRAVENOUS
Qty: 0 | Refills: 0 | Status: DISCONTINUED | OUTPATIENT
Start: 2018-02-17 | End: 2018-02-17

## 2018-02-17 RX ORDER — TIOTROPIUM BROMIDE 18 UG/1
1 CAPSULE ORAL; RESPIRATORY (INHALATION) DAILY
Qty: 0 | Refills: 0 | Status: DISCONTINUED | OUTPATIENT
Start: 2018-02-17 | End: 2018-02-23

## 2018-02-17 RX ORDER — HYDROMORPHONE HYDROCHLORIDE 2 MG/ML
1 INJECTION INTRAMUSCULAR; INTRAVENOUS; SUBCUTANEOUS
Qty: 0 | Refills: 0 | Status: DISCONTINUED | OUTPATIENT
Start: 2018-02-17 | End: 2018-02-17

## 2018-02-17 RX ORDER — MECLIZINE HCL 12.5 MG
12.5 TABLET ORAL THREE TIMES A DAY
Qty: 0 | Refills: 0 | Status: DISCONTINUED | OUTPATIENT
Start: 2018-02-17 | End: 2018-02-23

## 2018-02-17 RX ORDER — HEPARIN SODIUM 5000 [USP'U]/ML
5000 INJECTION INTRAVENOUS; SUBCUTANEOUS EVERY 12 HOURS
Qty: 0 | Refills: 0 | Status: DISCONTINUED | OUTPATIENT
Start: 2018-02-17 | End: 2018-02-17

## 2018-02-17 RX ORDER — NICOTINE POLACRILEX 2 MG
1 GUM BUCCAL DAILY
Qty: 0 | Refills: 0 | Status: DISCONTINUED | OUTPATIENT
Start: 2018-02-17 | End: 2018-02-23

## 2018-02-17 RX ORDER — ATORVASTATIN CALCIUM 80 MG/1
20 TABLET, FILM COATED ORAL AT BEDTIME
Qty: 0 | Refills: 0 | Status: DISCONTINUED | OUTPATIENT
Start: 2018-02-17 | End: 2018-02-23

## 2018-02-17 RX ORDER — INSULIN LISPRO 100/ML
VIAL (ML) SUBCUTANEOUS
Qty: 0 | Refills: 0 | Status: DISCONTINUED | OUTPATIENT
Start: 2018-02-17 | End: 2018-02-23

## 2018-02-17 RX ORDER — SENNA PLUS 8.6 MG/1
2 TABLET ORAL AT BEDTIME
Qty: 0 | Refills: 0 | Status: DISCONTINUED | OUTPATIENT
Start: 2018-02-17 | End: 2018-02-23

## 2018-02-17 RX ORDER — SUCRALFATE 1 G
1 TABLET ORAL EVERY 6 HOURS
Qty: 0 | Refills: 0 | Status: DISCONTINUED | OUTPATIENT
Start: 2018-02-17 | End: 2018-02-22

## 2018-02-17 RX ORDER — FENTANYL CITRATE 50 UG/ML
50 INJECTION INTRAVENOUS
Qty: 0 | Refills: 0 | Status: DISCONTINUED | OUTPATIENT
Start: 2018-02-17 | End: 2018-02-17

## 2018-02-17 RX ORDER — LISINOPRIL 2.5 MG/1
5 TABLET ORAL DAILY
Qty: 0 | Refills: 0 | Status: DISCONTINUED | OUTPATIENT
Start: 2018-02-17 | End: 2018-02-23

## 2018-02-17 RX ORDER — DIPHENHYDRAMINE HCL 50 MG
12.5 CAPSULE ORAL EVERY 8 HOURS
Qty: 0 | Refills: 0 | Status: DISCONTINUED | OUTPATIENT
Start: 2018-02-17 | End: 2018-02-23

## 2018-02-17 RX ORDER — DOCUSATE SODIUM 100 MG
100 CAPSULE ORAL
Qty: 0 | Refills: 0 | Status: DISCONTINUED | OUTPATIENT
Start: 2018-02-17 | End: 2018-02-19

## 2018-02-17 RX ORDER — DEXTROSE 50 % IN WATER 50 %
25 SYRINGE (ML) INTRAVENOUS ONCE
Qty: 0 | Refills: 0 | Status: DISCONTINUED | OUTPATIENT
Start: 2018-02-17 | End: 2018-02-23

## 2018-02-17 RX ORDER — SODIUM CHLORIDE 9 MG/ML
1000 INJECTION, SOLUTION INTRAVENOUS
Qty: 0 | Refills: 0 | Status: DISCONTINUED | OUTPATIENT
Start: 2018-02-17 | End: 2018-02-23

## 2018-02-17 RX ORDER — PANTOPRAZOLE SODIUM 20 MG/1
40 TABLET, DELAYED RELEASE ORAL
Qty: 0 | Refills: 0 | Status: DISCONTINUED | OUTPATIENT
Start: 2018-02-17 | End: 2018-02-23

## 2018-02-17 RX ORDER — DEXTROSE 50 % IN WATER 50 %
12.5 SYRINGE (ML) INTRAVENOUS ONCE
Qty: 0 | Refills: 0 | Status: DISCONTINUED | OUTPATIENT
Start: 2018-02-17 | End: 2018-02-23

## 2018-02-17 RX ORDER — INSULIN LISPRO 100/ML
VIAL (ML) SUBCUTANEOUS AT BEDTIME
Qty: 0 | Refills: 0 | Status: DISCONTINUED | OUTPATIENT
Start: 2018-02-17 | End: 2018-02-23

## 2018-02-17 RX ORDER — BACLOFEN 100 %
10 POWDER (GRAM) MISCELLANEOUS THREE TIMES A DAY
Qty: 0 | Refills: 0 | Status: DISCONTINUED | OUTPATIENT
Start: 2018-02-17 | End: 2018-02-23

## 2018-02-17 RX ORDER — OXCARBAZEPINE 300 MG/1
300 TABLET, FILM COATED ORAL
Qty: 0 | Refills: 0 | Status: DISCONTINUED | OUTPATIENT
Start: 2018-02-17 | End: 2018-02-23

## 2018-02-17 RX ORDER — HYDROMORPHONE HYDROCHLORIDE 2 MG/ML
2 INJECTION INTRAMUSCULAR; INTRAVENOUS; SUBCUTANEOUS EVERY 6 HOURS
Qty: 0 | Refills: 0 | Status: DISCONTINUED | OUTPATIENT
Start: 2018-02-17 | End: 2018-02-17

## 2018-02-17 RX ORDER — GLUCAGON INJECTION, SOLUTION 0.5 MG/.1ML
1 INJECTION, SOLUTION SUBCUTANEOUS ONCE
Qty: 0 | Refills: 0 | Status: DISCONTINUED | OUTPATIENT
Start: 2018-02-17 | End: 2018-02-23

## 2018-02-17 RX ORDER — ACETAMINOPHEN 500 MG
1000 TABLET ORAL ONCE
Qty: 0 | Refills: 0 | Status: DISCONTINUED | OUTPATIENT
Start: 2018-02-17 | End: 2018-02-17

## 2018-02-17 RX ORDER — MORPHINE SULFATE 50 MG/1
2 CAPSULE, EXTENDED RELEASE ORAL EVERY 4 HOURS
Qty: 0 | Refills: 0 | Status: DISCONTINUED | OUTPATIENT
Start: 2018-02-17 | End: 2018-02-23

## 2018-02-17 RX ORDER — CLONAZEPAM 1 MG
2 TABLET ORAL AT BEDTIME
Qty: 0 | Refills: 0 | Status: DISCONTINUED | OUTPATIENT
Start: 2018-02-17 | End: 2018-02-23

## 2018-02-17 RX ORDER — OXYCODONE AND ACETAMINOPHEN 5; 325 MG/1; MG/1
2 TABLET ORAL EVERY 4 HOURS
Qty: 0 | Refills: 0 | Status: DISCONTINUED | OUTPATIENT
Start: 2018-02-17 | End: 2018-02-21

## 2018-02-17 RX ORDER — SODIUM CHLORIDE 9 MG/ML
1000 INJECTION, SOLUTION INTRAVENOUS
Qty: 0 | Refills: 0 | Status: DISCONTINUED | OUTPATIENT
Start: 2018-02-17 | End: 2018-02-18

## 2018-02-17 RX ADMIN — SODIUM CHLORIDE 100 MILLILITER(S): 9 INJECTION, SOLUTION INTRAVENOUS at 13:11

## 2018-02-17 RX ADMIN — MORPHINE SULFATE 2 MILLIGRAM(S): 50 CAPSULE, EXTENDED RELEASE ORAL at 17:40

## 2018-02-17 RX ADMIN — OXCARBAZEPINE 300 MILLIGRAM(S): 300 TABLET, FILM COATED ORAL at 06:26

## 2018-02-17 RX ADMIN — Medication 100 MILLIGRAM(S): at 17:35

## 2018-02-17 RX ADMIN — Medication 1 GRAM(S): at 06:25

## 2018-02-17 RX ADMIN — Medication 1 GRAM(S): at 17:35

## 2018-02-17 RX ADMIN — Medication 2 MILLIGRAM(S): at 21:29

## 2018-02-17 RX ADMIN — MORPHINE SULFATE 1 MILLIGRAM(S): 50 CAPSULE, EXTENDED RELEASE ORAL at 08:02

## 2018-02-17 RX ADMIN — HEPARIN SODIUM 5000 UNIT(S): 5000 INJECTION INTRAVENOUS; SUBCUTANEOUS at 17:35

## 2018-02-17 RX ADMIN — MORPHINE SULFATE 2 MILLIGRAM(S): 50 CAPSULE, EXTENDED RELEASE ORAL at 22:15

## 2018-02-17 RX ADMIN — SENNA PLUS 2 TABLET(S): 8.6 TABLET ORAL at 21:29

## 2018-02-17 RX ADMIN — ATORVASTATIN CALCIUM 20 MILLIGRAM(S): 80 TABLET, FILM COATED ORAL at 21:29

## 2018-02-17 RX ADMIN — HEPARIN SODIUM 5000 UNIT(S): 5000 INJECTION INTRAVENOUS; SUBCUTANEOUS at 10:10

## 2018-02-17 RX ADMIN — MORPHINE SULFATE 1 MILLIGRAM(S): 50 CAPSULE, EXTENDED RELEASE ORAL at 08:17

## 2018-02-17 RX ADMIN — MORPHINE SULFATE 2 MILLIGRAM(S): 50 CAPSULE, EXTENDED RELEASE ORAL at 17:55

## 2018-02-17 RX ADMIN — OXCARBAZEPINE 300 MILLIGRAM(S): 300 TABLET, FILM COATED ORAL at 17:35

## 2018-02-17 RX ADMIN — Medication 50 MILLIGRAM(S): at 21:29

## 2018-02-17 RX ADMIN — MORPHINE SULFATE 2 MILLIGRAM(S): 50 CAPSULE, EXTENDED RELEASE ORAL at 21:56

## 2018-02-17 RX ADMIN — Medication 8: at 15:56

## 2018-02-17 RX ADMIN — Medication 400 MILLIGRAM(S): at 13:11

## 2018-02-17 RX ADMIN — LISINOPRIL 5 MILLIGRAM(S): 2.5 TABLET ORAL at 06:26

## 2018-02-17 RX ADMIN — Medication 1000 MILLIGRAM(S): at 13:11

## 2018-02-17 RX ADMIN — MORPHINE SULFATE 1 MILLIGRAM(S): 50 CAPSULE, EXTENDED RELEASE ORAL at 01:04

## 2018-02-17 RX ADMIN — GABAPENTIN 600 MILLIGRAM(S): 400 CAPSULE ORAL at 06:25

## 2018-02-17 NOTE — BRIEF OPERATIVE NOTE - PRE-OP DX
Acute cholecystitis  02/17/2018    Active  Lakesha Lemus Symptomatic cholelithiasis  02/17/2018    Active  Fabian Ramos

## 2018-02-17 NOTE — BRIEF OPERATIVE NOTE - PROCEDURE
<<-----Click on this checkbox to enter Procedure Laparoscopic cholecystectomy  02/17/2018    Active  TALITA

## 2018-02-17 NOTE — PROGRESS NOTE ADULT - SUBJECTIVE AND OBJECTIVE BOX
Patient seen and examined.  Currently denies pain or nausea.  NPO for cholecystectomy this AM.     She continues to have a normal WBC = 9.6 and remains afebrile.    NAD  Abdomen - soft, non-tender

## 2018-02-17 NOTE — BRIEF OPERATIVE NOTE - POST-OP DX
Cholelithiasis  02/17/2018    Active  Lakesha Lemus  Hydrops of gallbladder  02/17/2018    Active  Lakesha Lemus Hydrops of gallbladder  02/17/2018    Active  Lakesha Lemus Calculus of gallbladder with biliary obstruction but without cholecystitis  02/17/2018    Active  Fabian Ramos  Hydrops of gallbladder  02/17/2018    Active  Lakesha Lemus

## 2018-02-17 NOTE — PROGRESS NOTE ADULT - SUBJECTIVE AND OBJECTIVE BOX
INTERVAL HPI/OVERNIGHT EVENTS:  Pt seen and examined at bedside.     Allergies/Intolerance: No Known Allergies      MEDICATIONS  (STANDING):  acetaminophen  IVPB. 1000 milliGRAM(s) IV Intermittent once  atorvastatin 20 milliGRAM(s) Oral at bedtime  clonazePAM Tablet 2 milliGRAM(s) Oral at bedtime  dextrose 5%. 1000 milliLiter(s) (50 mL/Hr) IV Continuous <Continuous>  dextrose 50% Injectable 12.5 Gram(s) IV Push once  dextrose 50% Injectable 25 Gram(s) IV Push once  dextrose 50% Injectable 25 Gram(s) IV Push once  docusate sodium 100 milliGRAM(s) Oral two times a day  insulin lispro (HumaLOG) corrective regimen sliding scale   SubCutaneous three times a day before meals  insulin lispro (HumaLOG) corrective regimen sliding scale   SubCutaneous at bedtime  lactated ringers. 1000 milliLiter(s) (100 mL/Hr) IV Continuous <Continuous>  lisinopril 5 milliGRAM(s) Oral daily  nicotine - 21 mG/24Hr(s) Patch 1 patch Transdermal daily  OXcarbazepine 300 milliGRAM(s) Oral two times a day  senna 2 Tablet(s) Oral at bedtime  sucralfate 1 Gram(s) Oral every 6 hours  tiotropium 18 MICROgram(s) Capsule 1 Capsule(s) Inhalation daily  traZODone 50 milliGRAM(s) Oral at bedtime    MEDICATIONS  (PRN):  ALBUTerol    90 MICROgram(s) HFA Inhaler 2 Puff(s) Inhalation every 6 hours PRN Shortness of Breath and/or Wheezing  baclofen 10 milliGRAM(s) Oral three times a day PRN Mild pain  dextrose Gel 1 Dose(s) Oral once PRN Blood Glucose LESS THAN 70 milliGRAM(s)/deciliter  diphenhydrAMINE   Injectable 12.5 milliGRAM(s) IV Push every 8 hours PRN itchiness  fentaNYL    Injectable 50 MICROGram(s) IV Push every 5 minutes PRN Moderate Pain (4 - 6)  glucagon  Injectable 1 milliGRAM(s) IntraMuscular once PRN Glucose LESS THAN 70 milligrams/deciliter  HYDROmorphone  Injectable 1 milliGRAM(s) IV Push every 10 minutes PRN Severe Pain (7 - 10)  HYDROmorphone  Injectable 2 milliGRAM(s) IV Push every 6 hours PRN Severe Pain (7 - 10)  meclizine 12.5 milliGRAM(s) Oral three times a day PRN Dizziness  morphine  - Injectable 2 milliGRAM(s) IV Push every 4 hours PRN Moderate Pain (4 - 6)        ROS: all systems reviewed and wnl      PHYSICAL EXAMINATION:  Vital Signs Last 24 Hrs  T(C): 36.8 (17 Feb 2018 12:55), Max: 37.1 (16 Feb 2018 17:18)  T(F): 98.3 (17 Feb 2018 12:55), Max: 98.8 (16 Feb 2018 17:18)  HR: 86 (17 Feb 2018 13:10) (80 - 111)  BP: 133/74 (17 Feb 2018 13:05) (121/86 - 148/75)  BP(mean): --  RR: 15 (17 Feb 2018 13:10) (13 - 18)  SpO2: 97% (17 Feb 2018 13:10) (96% - 98%)  CAPILLARY BLOOD GLUCOSE      POCT Blood Glucose.: 160 mg/dL (17 Feb 2018 06:22)  POCT Blood Glucose.: 174 mg/dL (16 Feb 2018 22:05)  POCT Blood Glucose.: 203 mg/dL (16 Feb 2018 18:42)      02-16 @ 07:01  -  02-17 @ 07:00  --------------------------------------------------------  IN: 900 mL / OUT: 3 mL / NET: 897 mL        GENERAL:   NECK: supple, No JVD  CHEST/LUNG: clear to auscultation bilaterally; no rales, rhonchi, or wheezing b/l  HEART: normal S1, S2  ABDOMEN: BS+, soft, ND, NT   EXTREMITIES:  pulses palpable; no clubbing, cyanosis, or edema b/l LEs  SKIN: no rashes or lesions      LABS:                        14.1   9.65  )-----------( 254      ( 17 Feb 2018 06:25 )             40.8     02-17    141  |  104  |  7   ----------------------------<  167<H>  3.7   |  28  |  0.40<L>    Ca    9.2      17 Feb 2018 06:25  Phos  3.9     02-17  Mg     2.0     02-17    TPro  6.8  /  Alb  3.6  /  TBili  0.6  /  DBili  .12  /  AST  13<L>  /  ALT  32  /  AlkPhos  102  02-16    PT/INR - ( 17 Feb 2018 06:25 )   PT: 11.2 sec;   INR: 1.03 ratio         PTT - ( 17 Feb 2018 06:25 )  PTT:29.0 sec INTERVAL HPI/OVERNIGHT EVENTS:  Pt seen and examined at bedside.     Allergies/Intolerance: No Known Allergies      MEDICATIONS  (STANDING):  acetaminophen  IVPB. 1000 milliGRAM(s) IV Intermittent once  atorvastatin 20 milliGRAM(s) Oral at bedtime  clonazePAM Tablet 2 milliGRAM(s) Oral at bedtime  dextrose 5%. 1000 milliLiter(s) (50 mL/Hr) IV Continuous <Continuous>  dextrose 50% Injectable 12.5 Gram(s) IV Push once  dextrose 50% Injectable 25 Gram(s) IV Push once  dextrose 50% Injectable 25 Gram(s) IV Push once  docusate sodium 100 milliGRAM(s) Oral two times a day  insulin lispro (HumaLOG) corrective regimen sliding scale   SubCutaneous three times a day before meals  insulin lispro (HumaLOG) corrective regimen sliding scale   SubCutaneous at bedtime  lactated ringers. 1000 milliLiter(s) (100 mL/Hr) IV Continuous <Continuous>  lisinopril 5 milliGRAM(s) Oral daily  nicotine - 21 mG/24Hr(s) Patch 1 patch Transdermal daily  OXcarbazepine 300 milliGRAM(s) Oral two times a day  senna 2 Tablet(s) Oral at bedtime  sucralfate 1 Gram(s) Oral every 6 hours  tiotropium 18 MICROgram(s) Capsule 1 Capsule(s) Inhalation daily  traZODone 50 milliGRAM(s) Oral at bedtime    MEDICATIONS  (PRN):  ALBUTerol    90 MICROgram(s) HFA Inhaler 2 Puff(s) Inhalation every 6 hours PRN Shortness of Breath and/or Wheezing  baclofen 10 milliGRAM(s) Oral three times a day PRN Mild pain  dextrose Gel 1 Dose(s) Oral once PRN Blood Glucose LESS THAN 70 milliGRAM(s)/deciliter  diphenhydrAMINE   Injectable 12.5 milliGRAM(s) IV Push every 8 hours PRN itchiness  fentaNYL    Injectable 50 MICROGram(s) IV Push every 5 minutes PRN Moderate Pain (4 - 6)  glucagon  Injectable 1 milliGRAM(s) IntraMuscular once PRN Glucose LESS THAN 70 milligrams/deciliter  HYDROmorphone  Injectable 1 milliGRAM(s) IV Push every 10 minutes PRN Severe Pain (7 - 10)  HYDROmorphone  Injectable 2 milliGRAM(s) IV Push every 6 hours PRN Severe Pain (7 - 10)  meclizine 12.5 milliGRAM(s) Oral three times a day PRN Dizziness  morphine  - Injectable 2 milliGRAM(s) IV Push every 4 hours PRN Moderate Pain (4 - 6)        ROS: all systems reviewed and wnl      PHYSICAL EXAMINATION:  Vital Signs Last 24 Hrs  T(C): 36.8 (17 Feb 2018 12:55), Max: 37.1 (16 Feb 2018 17:18)  T(F): 98.3 (17 Feb 2018 12:55), Max: 98.8 (16 Feb 2018 17:18)  HR: 86 (17 Feb 2018 13:10) (80 - 111)  BP: 133/74 (17 Feb 2018 13:05) (121/86 - 148/75)  BP(mean): --  RR: 15 (17 Feb 2018 13:10) (13 - 18)  SpO2: 97% (17 Feb 2018 13:10) (96% - 98%)  CAPILLARY BLOOD GLUCOSE      POCT Blood Glucose.: 160 mg/dL (17 Feb 2018 06:22)  POCT Blood Glucose.: 174 mg/dL (16 Feb 2018 22:05)  POCT Blood Glucose.: 203 mg/dL (16 Feb 2018 18:42)      02-16 @ 07:01  -  02-17 @ 07:00  --------------------------------------------------------  IN: 900 mL / OUT: 3 mL / NET: 897 mL        GENERAL: seen on 1-B after lap.robb. NAD. no abdominal pain, fevers or cough  NECK: supple, No JVD  CHEST/LUNG: clear to auscultation bilaterally; no rales, rhonchi, or wheezing b/l  HEART: normal S1, S2  ABDOMEN: BS+, soft, ND, NT   EXTREMITIES:  pulses palpable; no clubbing, cyanosis, or edema b/l LEs  SKIN: no rashes or lesions      LABS:                        14.1   9.65  )-----------( 254      ( 17 Feb 2018 06:25 )             40.8     02-17    141  |  104  |  7   ----------------------------<  167<H>  3.7   |  28  |  0.40<L>    Ca    9.2      17 Feb 2018 06:25  Phos  3.9     02-17  Mg     2.0     02-17    TPro  6.8  /  Alb  3.6  /  TBili  0.6  /  DBili  .12  /  AST  13<L>  /  ALT  32  /  AlkPhos  102  02-16    PT/INR - ( 17 Feb 2018 06:25 )   PT: 11.2 sec;   INR: 1.03 ratio         PTT - ( 17 Feb 2018 06:25 )  PTT:29.0 sec

## 2018-02-17 NOTE — PROGRESS NOTE ADULT - SUBJECTIVE AND OBJECTIVE BOX
Surgical Postop Check:    51yo Female seen and examined at bedside. Admits to mild abdominal pain, well controlled with medication. Tolerating clears, No N/V. Pt. ambulating. Voiding well.  Denies f/c, chest pain, shortness of breath, dizziness, dysuria.     Vital Signs Last 24 Hrs  T(F): 98.4 (02-17-18 @ 17:40), Max: 98.6 (02-17-18 @ 04:32)  HR: 99 (02-17-18 @ 17:40)  BP: 119/72 (02-17-18 @ 17:40)  RR: 20 (02-17-18 @ 17:40)  SpO2: 94% (02-17-18 @ 17:40)      POCT Blood Glucose.: 337 mg/dL (17 Feb 2018 15:55)      Physical Exam:  GENERAL: A&Ox3, NAD  CHEST/LUNG: Clear to auscultation bilaterally, respirations nonlabored  HEART: S1S2, Regular rate and rhythm  ABDOMEN: Dressings x4 C/D/I; Nondistended, + Bowel sounds, soft, Appropriate incisional tenderness, no guarding  EXTREMITIES: 2+ peripheral pulses b/l. Calf soft, NT b/l, No edema, IPCs in place bilaterally    Assessment: 51yo Female s/p Lap Enedina POD 0, postop stable with adequate pain control on morphine.    Plan:  - Advance diet as tolerated  - Continue local wound care  - pain management prn, may need to call pain management specialist tomorrow if patient with uncontrolled post surgical pain on Percocet   - Prophylactic measures: DVT prophylaxis, encourage Incentive Spirometer, OOB, Ambulating  - continue current medical management/supportive care  - Carafate/PPI  - F/u am labs  - D/C planning pending tolerance to reg diet and adequate pain control  - Discussed with Dr. Ramos

## 2018-02-17 NOTE — PROGRESS NOTE ADULT - PROBLEM SELECTOR PLAN 1
Clears, advance diet per surgery. LFTs wnl. Morphine PRN for pain.  Zofran PRN for nausea/vomiting. HIDA scan positive yesterday for acute choly.

## 2018-02-18 LAB
ALBUMIN SERPL ELPH-MCNC: 3.2 G/DL — LOW (ref 3.3–5)
ALP SERPL-CCNC: 78 U/L — SIGNIFICANT CHANGE UP (ref 40–120)
ALT FLD-CCNC: 35 U/L — SIGNIFICANT CHANGE UP (ref 12–78)
ANION GAP SERPL CALC-SCNC: 6 MMOL/L — SIGNIFICANT CHANGE UP (ref 5–17)
AST SERPL-CCNC: 34 U/L — SIGNIFICANT CHANGE UP (ref 15–37)
BILIRUB SERPL-MCNC: 0.5 MG/DL — SIGNIFICANT CHANGE UP (ref 0.2–1.2)
BUN SERPL-MCNC: 5 MG/DL — LOW (ref 7–23)
CALCIUM SERPL-MCNC: 8.7 MG/DL — SIGNIFICANT CHANGE UP (ref 8.5–10.1)
CHLORIDE SERPL-SCNC: 107 MMOL/L — SIGNIFICANT CHANGE UP (ref 96–108)
CO2 SERPL-SCNC: 29 MMOL/L — SIGNIFICANT CHANGE UP (ref 22–31)
CREAT SERPL-MCNC: 0.41 MG/DL — LOW (ref 0.5–1.3)
GLUCOSE SERPL-MCNC: 133 MG/DL — HIGH (ref 70–99)
HBA1C BLD-MCNC: 7.3 % — HIGH (ref 4–5.6)
HCT VFR BLD CALC: 35.2 % — SIGNIFICANT CHANGE UP (ref 34.5–45)
HGB BLD-MCNC: 12 G/DL — SIGNIFICANT CHANGE UP (ref 11.5–15.5)
MAGNESIUM SERPL-MCNC: 1.8 MG/DL — SIGNIFICANT CHANGE UP (ref 1.6–2.6)
MCHC RBC-ENTMCNC: 28.6 PG — SIGNIFICANT CHANGE UP (ref 27–34)
MCHC RBC-ENTMCNC: 34.1 GM/DL — SIGNIFICANT CHANGE UP (ref 32–36)
MCV RBC AUTO: 83.8 FL — SIGNIFICANT CHANGE UP (ref 80–100)
NRBC # BLD: 0 /100 WBCS — SIGNIFICANT CHANGE UP (ref 0–0)
PHOSPHATE SERPL-MCNC: 3.5 MG/DL — SIGNIFICANT CHANGE UP (ref 2.5–4.5)
PLATELET # BLD AUTO: 215 K/UL — SIGNIFICANT CHANGE UP (ref 150–400)
POTASSIUM SERPL-MCNC: 3.9 MMOL/L — SIGNIFICANT CHANGE UP (ref 3.5–5.3)
POTASSIUM SERPL-SCNC: 3.9 MMOL/L — SIGNIFICANT CHANGE UP (ref 3.5–5.3)
PROT SERPL-MCNC: 6 GM/DL — SIGNIFICANT CHANGE UP (ref 6–8.3)
RBC # BLD: 4.2 M/UL — SIGNIFICANT CHANGE UP (ref 3.8–5.2)
RBC # FLD: 12.6 % — SIGNIFICANT CHANGE UP (ref 10.3–14.5)
SODIUM SERPL-SCNC: 142 MMOL/L — SIGNIFICANT CHANGE UP (ref 135–145)
WBC # BLD: 9.26 K/UL — SIGNIFICANT CHANGE UP (ref 3.8–10.5)
WBC # FLD AUTO: 9.26 K/UL — SIGNIFICANT CHANGE UP (ref 3.8–10.5)

## 2018-02-18 PROCEDURE — 99233 SBSQ HOSP IP/OBS HIGH 50: CPT

## 2018-02-18 RX ORDER — ONDANSETRON 8 MG/1
4 TABLET, FILM COATED ORAL EVERY 6 HOURS
Qty: 0 | Refills: 0 | Status: COMPLETED | OUTPATIENT
Start: 2018-02-18 | End: 2018-02-18

## 2018-02-18 RX ORDER — SODIUM CHLORIDE 9 MG/ML
1000 INJECTION, SOLUTION INTRAVENOUS
Qty: 0 | Refills: 0 | Status: DISCONTINUED | OUTPATIENT
Start: 2018-02-18 | End: 2018-02-18

## 2018-02-18 RX ADMIN — MORPHINE SULFATE 2 MILLIGRAM(S): 50 CAPSULE, EXTENDED RELEASE ORAL at 11:06

## 2018-02-18 RX ADMIN — LISINOPRIL 5 MILLIGRAM(S): 2.5 TABLET ORAL at 06:17

## 2018-02-18 RX ADMIN — MORPHINE SULFATE 2 MILLIGRAM(S): 50 CAPSULE, EXTENDED RELEASE ORAL at 22:59

## 2018-02-18 RX ADMIN — SENNA PLUS 2 TABLET(S): 8.6 TABLET ORAL at 21:47

## 2018-02-18 RX ADMIN — MORPHINE SULFATE 2 MILLIGRAM(S): 50 CAPSULE, EXTENDED RELEASE ORAL at 07:00

## 2018-02-18 RX ADMIN — ATORVASTATIN CALCIUM 20 MILLIGRAM(S): 80 TABLET, FILM COATED ORAL at 21:47

## 2018-02-18 RX ADMIN — Medication 1 PATCH: at 11:04

## 2018-02-18 RX ADMIN — OXCARBAZEPINE 300 MILLIGRAM(S): 300 TABLET, FILM COATED ORAL at 06:17

## 2018-02-18 RX ADMIN — MORPHINE SULFATE 2 MILLIGRAM(S): 50 CAPSULE, EXTENDED RELEASE ORAL at 23:15

## 2018-02-18 RX ADMIN — MORPHINE SULFATE 2 MILLIGRAM(S): 50 CAPSULE, EXTENDED RELEASE ORAL at 16:57

## 2018-02-18 RX ADMIN — OXYCODONE AND ACETAMINOPHEN 2 TABLET(S): 5; 325 TABLET ORAL at 20:05

## 2018-02-18 RX ADMIN — MORPHINE SULFATE 2 MILLIGRAM(S): 50 CAPSULE, EXTENDED RELEASE ORAL at 02:25

## 2018-02-18 RX ADMIN — HEPARIN SODIUM 5000 UNIT(S): 5000 INJECTION INTRAVENOUS; SUBCUTANEOUS at 17:41

## 2018-02-18 RX ADMIN — MORPHINE SULFATE 2 MILLIGRAM(S): 50 CAPSULE, EXTENDED RELEASE ORAL at 16:42

## 2018-02-18 RX ADMIN — Medication 1 GRAM(S): at 11:04

## 2018-02-18 RX ADMIN — PANTOPRAZOLE SODIUM 40 MILLIGRAM(S): 20 TABLET, DELAYED RELEASE ORAL at 07:40

## 2018-02-18 RX ADMIN — Medication 2: at 16:42

## 2018-02-18 RX ADMIN — TIOTROPIUM BROMIDE 1 CAPSULE(S): 18 CAPSULE ORAL; RESPIRATORY (INHALATION) at 11:04

## 2018-02-18 RX ADMIN — Medication 2 MILLIGRAM(S): at 21:47

## 2018-02-18 RX ADMIN — MORPHINE SULFATE 2 MILLIGRAM(S): 50 CAPSULE, EXTENDED RELEASE ORAL at 02:07

## 2018-02-18 RX ADMIN — Medication 100 MILLIGRAM(S): at 06:17

## 2018-02-18 RX ADMIN — OXYCODONE AND ACETAMINOPHEN 2 TABLET(S): 5; 325 TABLET ORAL at 21:00

## 2018-02-18 RX ADMIN — SODIUM CHLORIDE 100 MILLILITER(S): 9 INJECTION, SOLUTION INTRAVENOUS at 02:09

## 2018-02-18 RX ADMIN — ONDANSETRON 4 MILLIGRAM(S): 8 TABLET, FILM COATED ORAL at 12:45

## 2018-02-18 RX ADMIN — Medication 1 GRAM(S): at 06:17

## 2018-02-18 RX ADMIN — Medication 50 MILLIGRAM(S): at 21:47

## 2018-02-18 RX ADMIN — MORPHINE SULFATE 2 MILLIGRAM(S): 50 CAPSULE, EXTENDED RELEASE ORAL at 06:41

## 2018-02-18 RX ADMIN — Medication 2: at 11:04

## 2018-02-18 RX ADMIN — Medication 1 GRAM(S): at 23:01

## 2018-02-18 RX ADMIN — OXCARBAZEPINE 300 MILLIGRAM(S): 300 TABLET, FILM COATED ORAL at 17:41

## 2018-02-18 RX ADMIN — HEPARIN SODIUM 5000 UNIT(S): 5000 INJECTION INTRAVENOUS; SUBCUTANEOUS at 06:17

## 2018-02-18 RX ADMIN — Medication 1 GRAM(S): at 17:41

## 2018-02-18 RX ADMIN — Medication 100 MILLIGRAM(S): at 17:41

## 2018-02-18 RX ADMIN — MORPHINE SULFATE 2 MILLIGRAM(S): 50 CAPSULE, EXTENDED RELEASE ORAL at 11:21

## 2018-02-18 NOTE — PROGRESS NOTE ADULT - PROBLEM SELECTOR PLAN 9
Will send Hep C ab as patient 52 years old

## 2018-02-18 NOTE — PROGRESS NOTE ADULT - PROBLEM SELECTOR PLAN 8
Diet and exercise as tolerated  Nutritionist consult

## 2018-02-18 NOTE — PROGRESS NOTE ADULT - PROBLEM SELECTOR PLAN 7
Nicotine patch  Discussed cessation

## 2018-02-18 NOTE — PROGRESS NOTE ADULT - SUBJECTIVE AND OBJECTIVE BOX
INTERVAL HPI/OVERNIGHT EVENTS:  Pt seen and examined at bedside.     Allergies/Intolerance: No Known Allergies      MEDICATIONS  (STANDING):  atorvastatin 20 milliGRAM(s) Oral at bedtime  clonazePAM Tablet 2 milliGRAM(s) Oral at bedtime  dextrose 5%. 1000 milliLiter(s) (50 mL/Hr) IV Continuous <Continuous>  dextrose 50% Injectable 12.5 Gram(s) IV Push once  dextrose 50% Injectable 25 Gram(s) IV Push once  dextrose 50% Injectable 25 Gram(s) IV Push once  docusate sodium 100 milliGRAM(s) Oral two times a day  heparin  Injectable 5000 Unit(s) SubCutaneous every 12 hours  insulin lispro (HumaLOG) corrective regimen sliding scale   SubCutaneous three times a day before meals  insulin lispro (HumaLOG) corrective regimen sliding scale   SubCutaneous at bedtime  lisinopril 5 milliGRAM(s) Oral daily  nicotine - 21 mG/24Hr(s) Patch 1 patch Transdermal daily  OXcarbazepine 300 milliGRAM(s) Oral two times a day  pantoprazole    Tablet 40 milliGRAM(s) Oral before breakfast  senna 2 Tablet(s) Oral at bedtime  sucralfate 1 Gram(s) Oral every 6 hours  tiotropium 18 MICROgram(s) Capsule 1 Capsule(s) Inhalation daily  traZODone 50 milliGRAM(s) Oral at bedtime    MEDICATIONS  (PRN):  ALBUTerol    90 MICROgram(s) HFA Inhaler 2 Puff(s) Inhalation every 6 hours PRN Shortness of Breath and/or Wheezing  baclofen 10 milliGRAM(s) Oral three times a day PRN Mild pain  dextrose Gel 1 Dose(s) Oral once PRN Blood Glucose LESS THAN 70 milliGRAM(s)/deciliter  diphenhydrAMINE   Injectable 12.5 milliGRAM(s) IV Push every 8 hours PRN itchiness  glucagon  Injectable 1 milliGRAM(s) IntraMuscular once PRN Glucose LESS THAN 70 milligrams/deciliter  meclizine 12.5 milliGRAM(s) Oral three times a day PRN Dizziness  morphine  - Injectable 2 milliGRAM(s) IV Push every 4 hours PRN Severe Pain (7 - 10)  ondansetron Injectable 4 milliGRAM(s) IV Push every 6 hours PRN Nausea and/or Vomiting  oxyCODONE    5 mG/acetaminophen 325 mG 1 Tablet(s) Oral every 4 hours PRN Mild Pain (1 - 3)  oxyCODONE    5 mG/acetaminophen 325 mG 2 Tablet(s) Oral every 4 hours PRN Moderate Pain (4 - 6)        ROS: all systems reviewed and wnl      PHYSICAL EXAMINATION:  Vital Signs Last 24 Hrs  T(C): 36.6 (18 Feb 2018 08:30), Max: 36.9 (17 Feb 2018 17:40)  T(F): 97.9 (18 Feb 2018 08:30), Max: 98.4 (17 Feb 2018 17:40)  HR: 92 (18 Feb 2018 08:30) (82 - 104)  BP: 155/90 (18 Feb 2018 08:30) (100/58 - 155/90)  BP(mean): --  RR: 18 (18 Feb 2018 08:30) (12 - 20)  SpO2: 95% (18 Feb 2018 08:30) (92% - 98%)  CAPILLARY BLOOD GLUCOSE      POCT Blood Glucose.: 187 mg/dL (18 Feb 2018 10:43)  POCT Blood Glucose.: 134 mg/dL (18 Feb 2018 07:17)  POCT Blood Glucose.: 161 mg/dL (17 Feb 2018 21:17)  POCT Blood Glucose.: 337 mg/dL (17 Feb 2018 15:55)  POCT Blood Glucose.: 217 mg/dL (17 Feb 2018 13:18)      02-17 @ 07:01  -  02-18 @ 07:00  --------------------------------------------------------  IN: 1670 mL / OUT: 0 mL / NET: 1670 mL        GENERAL:   NECK: supple, No JVD  CHEST/LUNG: clear to auscultation bilaterally; no rales, rhonchi, or wheezing b/l  HEART: normal S1, S2  ABDOMEN: BS+, soft, ND, NT   EXTREMITIES:  pulses palpable; no clubbing, cyanosis, or edema b/l LEs  SKIN: no rashes or lesions      LABS:                        12.0   9.26  )-----------( 215      ( 18 Feb 2018 08:31 )             35.2     02-18    142  |  107  |  5<L>  ----------------------------<  133<H>  3.9   |  29  |  0.41<L>    Ca    8.7      18 Feb 2018 08:31  Phos  3.5     02-18  Mg     1.8     02-18    TPro  6.0  /  Alb  3.2<L>  /  TBili  0.5  /  DBili  x   /  AST  34  /  ALT  35  /  AlkPhos  78  02-18    PT/INR - ( 17 Feb 2018 06:25 )   PT: 11.2 sec;   INR: 1.03 ratio         PTT - ( 17 Feb 2018 06:25 )  PTT:29.0 sec INTERVAL HPI/OVERNIGHT EVENTS:  Pt seen and examined at bedside.     Allergies/Intolerance: No Known Allergies      MEDICATIONS  (STANDING):  atorvastatin 20 milliGRAM(s) Oral at bedtime  clonazePAM Tablet 2 milliGRAM(s) Oral at bedtime  dextrose 5%. 1000 milliLiter(s) (50 mL/Hr) IV Continuous <Continuous>  dextrose 50% Injectable 12.5 Gram(s) IV Push once  dextrose 50% Injectable 25 Gram(s) IV Push once  dextrose 50% Injectable 25 Gram(s) IV Push once  docusate sodium 100 milliGRAM(s) Oral two times a day  heparin  Injectable 5000 Unit(s) SubCutaneous every 12 hours  insulin lispro (HumaLOG) corrective regimen sliding scale   SubCutaneous three times a day before meals  insulin lispro (HumaLOG) corrective regimen sliding scale   SubCutaneous at bedtime  lisinopril 5 milliGRAM(s) Oral daily  nicotine - 21 mG/24Hr(s) Patch 1 patch Transdermal daily  OXcarbazepine 300 milliGRAM(s) Oral two times a day  pantoprazole    Tablet 40 milliGRAM(s) Oral before breakfast  senna 2 Tablet(s) Oral at bedtime  sucralfate 1 Gram(s) Oral every 6 hours  tiotropium 18 MICROgram(s) Capsule 1 Capsule(s) Inhalation daily  traZODone 50 milliGRAM(s) Oral at bedtime    MEDICATIONS  (PRN):  ALBUTerol    90 MICROgram(s) HFA Inhaler 2 Puff(s) Inhalation every 6 hours PRN Shortness of Breath and/or Wheezing  baclofen 10 milliGRAM(s) Oral three times a day PRN Mild pain  dextrose Gel 1 Dose(s) Oral once PRN Blood Glucose LESS THAN 70 milliGRAM(s)/deciliter  diphenhydrAMINE   Injectable 12.5 milliGRAM(s) IV Push every 8 hours PRN itchiness  glucagon  Injectable 1 milliGRAM(s) IntraMuscular once PRN Glucose LESS THAN 70 milligrams/deciliter  meclizine 12.5 milliGRAM(s) Oral three times a day PRN Dizziness  morphine  - Injectable 2 milliGRAM(s) IV Push every 4 hours PRN Severe Pain (7 - 10)  ondansetron Injectable 4 milliGRAM(s) IV Push every 6 hours PRN Nausea and/or Vomiting  oxyCODONE    5 mG/acetaminophen 325 mG 1 Tablet(s) Oral every 4 hours PRN Mild Pain (1 - 3)  oxyCODONE    5 mG/acetaminophen 325 mG 2 Tablet(s) Oral every 4 hours PRN Moderate Pain (4 - 6)        ROS: all systems reviewed and wnl      PHYSICAL EXAMINATION:  Vital Signs Last 24 Hrs  T(C): 36.6 (18 Feb 2018 08:30), Max: 36.9 (17 Feb 2018 17:40)  T(F): 97.9 (18 Feb 2018 08:30), Max: 98.4 (17 Feb 2018 17:40)  HR: 92 (18 Feb 2018 08:30) (82 - 104)  BP: 155/90 (18 Feb 2018 08:30) (100/58 - 155/90)  BP(mean): --  RR: 18 (18 Feb 2018 08:30) (12 - 20)  SpO2: 95% (18 Feb 2018 08:30) (92% - 98%)  CAPILLARY BLOOD GLUCOSE      POCT Blood Glucose.: 187 mg/dL (18 Feb 2018 10:43)  POCT Blood Glucose.: 134 mg/dL (18 Feb 2018 07:17)  POCT Blood Glucose.: 161 mg/dL (17 Feb 2018 21:17)  POCT Blood Glucose.: 337 mg/dL (17 Feb 2018 15:55)  POCT Blood Glucose.: 217 mg/dL (17 Feb 2018 13:18)      02-17 @ 07:01  -  02-18 @ 07:00  --------------------------------------------------------  IN: 1670 mL / OUT: 0 mL / NET: 1670 mL        GENERAL: tolerating some food, had dinner last evening, no abdominal pain, mild nausea  NECK: supple, No JVD  CHEST/LUNG: clear to auscultation bilaterally; no rales, rhonchi, or wheezing b/l  HEART: normal S1, S2  ABDOMEN: BS+, soft, ND, NT   EXTREMITIES:  pulses palpable; no clubbing, cyanosis, or edema b/l LEs  SKIN: no rashes or lesions      LABS:                        12.0   9.26  )-----------( 215      ( 18 Feb 2018 08:31 )             35.2     02-18    142  |  107  |  5<L>  ----------------------------<  133<H>  3.9   |  29  |  0.41<L>    Ca    8.7      18 Feb 2018 08:31  Phos  3.5     02-18  Mg     1.8     02-18    TPro  6.0  /  Alb  3.2<L>  /  TBili  0.5  /  DBili  x   /  AST  34  /  ALT  35  /  AlkPhos  78  02-18    PT/INR - ( 17 Feb 2018 06:25 )   PT: 11.2 sec;   INR: 1.03 ratio         PTT - ( 17 Feb 2018 06:25 )  PTT:29.0 sec

## 2018-02-18 NOTE — PROGRESS NOTE ADULT - PROBLEM SELECTOR PROBLEM 9
Need for hepatitis C screening test

## 2018-02-18 NOTE — PROGRESS NOTE ADULT - PROBLEM SELECTOR PLAN 10
Early ambulation for DVT prophylaxis  General precautions

## 2018-02-18 NOTE — PROGRESS NOTE ADULT - PROBLEM SELECTOR PROBLEM 8
Non morbid obesity due to excess calories

## 2018-02-18 NOTE — PROGRESS NOTE ADULT - SUBJECTIVE AND OBJECTIVE BOX
INTERVAL HPI/OVERNIGHT EVENTS:  Pt. seen and examined at bedside. No acute overnight event. Patient crying out, complaining of "severe" abdominal pain, last had morphine around 2am in which pain was controlled mostly overnight, now requesting for more morphine. Patient denies N/V, tolerating clear liquids. No flatus, no BM yet.   Denies fever/chills, chest pain, dyspnea, cough, dizziness, dysuria.     Vital Signs Last 24 Hrs  T(C): 36.8 (18 Feb 2018 04:40), Max: 36.9 (17 Feb 2018 17:40)  T(F): 98.2 (18 Feb 2018 04:40), Max: 98.4 (17 Feb 2018 17:40)  HR: 96 (18 Feb 2018 04:40) (80 - 104)  BP: 141/78 (18 Feb 2018 04:40) (100/58 - 141/78)  RR: 18 (18 Feb 2018 04:40) (12 - 20)  SpO2: 97% (18 Feb 2018 04:40) (92% - 98%)    PHYSICAL EXAM:    GENERAL: Distress due to pain, crying and moaning, uncooperative  HEAD:  Atraumatic, Normocephalic  CHEST/LUNG: Clear to ausculation, bilaterally   HEART: S1S2  ABDOMEN: Abdominal dressings x4 C/D/I; non distended, +BS, soft, appropriate incisional tenderness, no guarding  EXTREMITIES:  calf soft, non tender b/l. 2+ distal pulses b/l.     I&O's Detail    16 Feb 2018 07:01  -  17 Feb 2018 07:00  --------------------------------------------------------  IN:    sodium chloride 0.9%: 900 mL  Total IN: 900 mL    OUT:    Voided: 3 mL  Total OUT: 3 mL    Total NET: 897 mL      17 Feb 2018 07:01  -  18 Feb 2018 06:48  --------------------------------------------------------  IN:    IV PiggyBack: 100 mL    lactated ringers.: 50 mL    lactated ringers.: 1200 mL    Oral Fluid: 320 mL  Total IN: 1670 mL    OUT:  Total OUT: 0 mL    Total NET: 1670 mL    LABS:                        14.1   9.65  )-----------( 254      ( 17 Feb 2018 06:25 )             40.8     02-17    141  |  104  |  7   ----------------------------<  167<H>  3.7   |  28  |  0.40<L>    Ca    9.2      17 Feb 2018 06:25  Phos  3.9     02-17  Mg     2.0     02-17    TPro  6.8  /  Alb  3.6  /  TBili  0.6  /  DBili  .12  /  AST  13<L>  /  ALT  32  /  AlkPhos  102  02-16    PT/INR - ( 17 Feb 2018 06:25 )   PT: 11.2 sec;   INR: 1.03 ratio         PTT - ( 17 Feb 2018 06:25 )  PTT:29.0 sec    Assessment: 51yo Female s/p Lap Enedina POD #1. Complaints of pain.    Plan:  - Advance diet as tolerated  - Continue local wound care  - pain management prn, Call pain management specialist if patient with uncontrolled post surgical pain on Percocet   - Prophylactic measures: DVT prophylaxis, encourage Incentive Spirometer, OOB, Ambulating  - continue current medical management/supportive care  - Carafate/PPI  - F/u am labs  - D/C planning pending tolerance to reg diet and adequate pain control  - discuss with Dr. Ramos

## 2018-02-18 NOTE — PROGRESS NOTE ADULT - PROBLEM SELECTOR PLAN 1
Diet advanced to regular today. LFTs wnl. Morphine PRN for pain.  Zofran PRN for nausea/vomiting. HIDA scan positive yesterday for acute choly. S/P lap   choly. Stop IVF given elevated BP today.

## 2018-02-18 NOTE — PROGRESS NOTE ADULT - ATTENDING COMMENTS
Patient seen and examined.  She states that she has nausea and that her pain is not well controlled.    AVSS  Abdomen - soft, non-distended, appropriate alina-incisional tenderness, incisions clean with Steri-strips    Labs WNL    Assessment:  52-year-old woman admitted with chronic right sided abdominal pain.  Workup revealed gastritis, a chronic duodenal ulcer, and a chronically obstructed gallbladder secondary to cholelithiasis.  She is now POD 1 s/p laparoscopic cholecystectomy.      Plan:  - Regular diet as tolerated.  Recommending continuing IV fluids until adequately tolerating a diet.  She is currently complaining of nausea and was therefore ordered for Zofran prn.  - Pain control.  Need to contact her chronic pain physician to get recommendations for a PO pain regimen.  - Continue PPI/Carafate for gastritis and the duodenal ulcer per GI recommendations.

## 2018-02-19 LAB
ALBUMIN SERPL ELPH-MCNC: 3.5 G/DL — SIGNIFICANT CHANGE UP (ref 3.3–5)
ALP SERPL-CCNC: 88 U/L — SIGNIFICANT CHANGE UP (ref 40–120)
ALT FLD-CCNC: 33 U/L — SIGNIFICANT CHANGE UP (ref 12–78)
ANION GAP SERPL CALC-SCNC: 11 MMOL/L — SIGNIFICANT CHANGE UP (ref 5–17)
AST SERPL-CCNC: 21 U/L — SIGNIFICANT CHANGE UP (ref 15–37)
BILIRUB DIRECT SERPL-MCNC: 0.12 MG/DL — SIGNIFICANT CHANGE UP (ref 0.05–0.2)
BILIRUB INDIRECT FLD-MCNC: 0.4 MG/DL — SIGNIFICANT CHANGE UP (ref 0.2–1)
BILIRUB SERPL-MCNC: 0.5 MG/DL — SIGNIFICANT CHANGE UP (ref 0.2–1.2)
BUN SERPL-MCNC: 5 MG/DL — LOW (ref 7–23)
CALCIUM SERPL-MCNC: 9 MG/DL — SIGNIFICANT CHANGE UP (ref 8.5–10.1)
CHLORIDE SERPL-SCNC: 103 MMOL/L — SIGNIFICANT CHANGE UP (ref 96–108)
CO2 SERPL-SCNC: 26 MMOL/L — SIGNIFICANT CHANGE UP (ref 22–31)
CREAT SERPL-MCNC: 0.38 MG/DL — LOW (ref 0.5–1.3)
GLUCOSE SERPL-MCNC: 142 MG/DL — HIGH (ref 70–99)
HCT VFR BLD CALC: 36.2 % — SIGNIFICANT CHANGE UP (ref 34.5–45)
HGB BLD-MCNC: 12.6 G/DL — SIGNIFICANT CHANGE UP (ref 11.5–15.5)
MCHC RBC-ENTMCNC: 29.1 PG — SIGNIFICANT CHANGE UP (ref 27–34)
MCHC RBC-ENTMCNC: 34.8 GM/DL — SIGNIFICANT CHANGE UP (ref 32–36)
MCV RBC AUTO: 83.6 FL — SIGNIFICANT CHANGE UP (ref 80–100)
NRBC # BLD: 0 /100 WBCS — SIGNIFICANT CHANGE UP (ref 0–0)
PLATELET # BLD AUTO: 225 K/UL — SIGNIFICANT CHANGE UP (ref 150–400)
POTASSIUM SERPL-MCNC: 3.6 MMOL/L — SIGNIFICANT CHANGE UP (ref 3.5–5.3)
POTASSIUM SERPL-SCNC: 3.6 MMOL/L — SIGNIFICANT CHANGE UP (ref 3.5–5.3)
PROT SERPL-MCNC: 6.6 GM/DL — SIGNIFICANT CHANGE UP (ref 6–8.3)
RBC # BLD: 4.33 M/UL — SIGNIFICANT CHANGE UP (ref 3.8–5.2)
RBC # FLD: 12.4 % — SIGNIFICANT CHANGE UP (ref 10.3–14.5)
SODIUM SERPL-SCNC: 140 MMOL/L — SIGNIFICANT CHANGE UP (ref 135–145)
WBC # BLD: 7.73 K/UL — SIGNIFICANT CHANGE UP (ref 3.8–10.5)
WBC # FLD AUTO: 7.73 K/UL — SIGNIFICANT CHANGE UP (ref 3.8–10.5)

## 2018-02-19 PROCEDURE — 99233 SBSQ HOSP IP/OBS HIGH 50: CPT

## 2018-02-19 RX ORDER — SODIUM CHLORIDE 9 MG/ML
1000 INJECTION, SOLUTION INTRAVENOUS
Qty: 0 | Refills: 0 | Status: DISCONTINUED | OUTPATIENT
Start: 2018-02-19 | End: 2018-02-20

## 2018-02-19 RX ORDER — DOCUSATE SODIUM 100 MG
100 CAPSULE ORAL THREE TIMES A DAY
Qty: 0 | Refills: 0 | Status: DISCONTINUED | OUTPATIENT
Start: 2018-02-19 | End: 2018-02-23

## 2018-02-19 RX ADMIN — Medication 2: at 08:04

## 2018-02-19 RX ADMIN — Medication 100 MILLIGRAM(S): at 13:12

## 2018-02-19 RX ADMIN — Medication 1 GRAM(S): at 17:04

## 2018-02-19 RX ADMIN — Medication 1 GRAM(S): at 12:05

## 2018-02-19 RX ADMIN — Medication 100 MILLIGRAM(S): at 05:14

## 2018-02-19 RX ADMIN — MORPHINE SULFATE 2 MILLIGRAM(S): 50 CAPSULE, EXTENDED RELEASE ORAL at 12:01

## 2018-02-19 RX ADMIN — OXCARBAZEPINE 300 MILLIGRAM(S): 300 TABLET, FILM COATED ORAL at 05:08

## 2018-02-19 RX ADMIN — ATORVASTATIN CALCIUM 20 MILLIGRAM(S): 80 TABLET, FILM COATED ORAL at 22:08

## 2018-02-19 RX ADMIN — MORPHINE SULFATE 2 MILLIGRAM(S): 50 CAPSULE, EXTENDED RELEASE ORAL at 16:17

## 2018-02-19 RX ADMIN — MORPHINE SULFATE 2 MILLIGRAM(S): 50 CAPSULE, EXTENDED RELEASE ORAL at 17:05

## 2018-02-19 RX ADMIN — OXYCODONE AND ACETAMINOPHEN 2 TABLET(S): 5; 325 TABLET ORAL at 19:45

## 2018-02-19 RX ADMIN — Medication 2: at 12:04

## 2018-02-19 RX ADMIN — PANTOPRAZOLE SODIUM 40 MILLIGRAM(S): 20 TABLET, DELAYED RELEASE ORAL at 08:07

## 2018-02-19 RX ADMIN — Medication 10 MILLIGRAM(S): at 22:13

## 2018-02-19 RX ADMIN — SENNA PLUS 2 TABLET(S): 8.6 TABLET ORAL at 22:08

## 2018-02-19 RX ADMIN — SODIUM CHLORIDE 75 MILLILITER(S): 9 INJECTION, SOLUTION INTRAVENOUS at 13:11

## 2018-02-19 RX ADMIN — Medication 2: at 17:03

## 2018-02-19 RX ADMIN — HEPARIN SODIUM 5000 UNIT(S): 5000 INJECTION INTRAVENOUS; SUBCUTANEOUS at 05:14

## 2018-02-19 RX ADMIN — Medication 1 PATCH: at 12:04

## 2018-02-19 RX ADMIN — TIOTROPIUM BROMIDE 1 CAPSULE(S): 18 CAPSULE ORAL; RESPIRATORY (INHALATION) at 12:05

## 2018-02-19 RX ADMIN — Medication 50 MILLIGRAM(S): at 22:08

## 2018-02-19 RX ADMIN — MORPHINE SULFATE 2 MILLIGRAM(S): 50 CAPSULE, EXTENDED RELEASE ORAL at 05:25

## 2018-02-19 RX ADMIN — Medication 1 PATCH: at 12:00

## 2018-02-19 RX ADMIN — LISINOPRIL 5 MILLIGRAM(S): 2.5 TABLET ORAL at 05:08

## 2018-02-19 RX ADMIN — HEPARIN SODIUM 5000 UNIT(S): 5000 INJECTION INTRAVENOUS; SUBCUTANEOUS at 17:04

## 2018-02-19 RX ADMIN — OXYCODONE AND ACETAMINOPHEN 2 TABLET(S): 5; 325 TABLET ORAL at 19:16

## 2018-02-19 RX ADMIN — Medication 1 GRAM(S): at 05:08

## 2018-02-19 RX ADMIN — OXCARBAZEPINE 300 MILLIGRAM(S): 300 TABLET, FILM COATED ORAL at 17:03

## 2018-02-19 RX ADMIN — Medication 100 MILLIGRAM(S): at 22:08

## 2018-02-19 RX ADMIN — MORPHINE SULFATE 2 MILLIGRAM(S): 50 CAPSULE, EXTENDED RELEASE ORAL at 05:08

## 2018-02-19 RX ADMIN — Medication 2 MILLIGRAM(S): at 22:08

## 2018-02-19 RX ADMIN — MORPHINE SULFATE 2 MILLIGRAM(S): 50 CAPSULE, EXTENDED RELEASE ORAL at 12:30

## 2018-02-19 NOTE — PROGRESS NOTE ADULT - SUBJECTIVE AND OBJECTIVE BOX
Gastroenterology  Patient seen and examined bedside resting comfortably.      T(F): 97.9 (02-19-18 @ 04:58), Max: 98.4 (02-18-18 @ 12:40)  HR: 89 (02-19-18 @ 04:58) (82 - 101)  BP: 160/89 (02-19-18 @ 04:58) (122/60 - 160/89)  RR: 18 (02-19-18 @ 04:58) (17 - 20)  SpO2: 97% (02-19-18 @ 04:58) (96% - 98%)  Wt(kg): --  CAPILLARY BLOOD GLUCOSE      POCT Blood Glucose.: 154 mg/dL (19 Feb 2018 07:09)  POCT Blood Glucose.: 163 mg/dL (18 Feb 2018 21:11)  POCT Blood Glucose.: 179 mg/dL (18 Feb 2018 16:41)  POCT Blood Glucose.: 187 mg/dL (18 Feb 2018 10:43)      PHYSICAL EXAM:  General: NAD, WDWN.   Neuro:  Alert & oriented x 3  HEENT: NCAT, EOMI, conjunctiva clear  CV: +S1+S2 regular rate and rhythm  Lung: clear to ausculation bilaterally, respirations nonlabored, good inspiratory effort  Abdomen: soft, NonTender, No distention Normal active BS  Extremities: no cyanosis, clubbing or edema    LABS:                        12.6   7.73  )-----------( 225      ( 19 Feb 2018 07:18 )             36.2     02-19    140  |  103  |  5<L>  ----------------------------<  142<H>  3.6   |  26  |  0.38<L>    Ca    9.0      19 Feb 2018 07:18  Phos  3.5     02-18  Mg     1.8     02-18    TPro  6.6  /  Alb  3.5  /  TBili  0.5  /  DBili  .12  /  AST  21  /  ALT  33  /  AlkPhos  88  02-19    LIVER FUNCTIONS - ( 19 Feb 2018 07:18 )  Alb: 3.5 g/dL / Pro: 6.6 gm/dL / ALK PHOS: 88 U/L / ALT: 33 U/L / AST: 21 U/L / GGT: x             I&O's Detail    18 Feb 2018 07:01  -  19 Feb 2018 07:00  --------------------------------------------------------  IN:    Oral Fluid: 120 mL  Total IN: 120 mL    OUT:  Total OUT: 0 mL    Total NET: 120 mL        02-19 @ 07:18    140 | 103 | 5  /9.0 | -- | --  _______________________/  3.6 | 26 | 0.38                           \par

## 2018-02-19 NOTE — PROGRESS NOTE ADULT - SUBJECTIVE AND OBJECTIVE BOX
Patient is a 52y old  Female who presents with a chief complaint of RUQ pain (14 Feb 2018 03:02)        HPI:  52 year old woman with PMH of asthma, HTN, HLD, panic d/o, DM2, bipolar 2, COPD, lung nodules presents complaining of RUQ pain.  She states that for the last month she has had RUQ pain since a CT scan as outpt found gallstone.  The pain has become increasingly worse so her PMD sent her to the hospital.  She denies fever, chills, nausea, vomiting, diarrhea.    In the ED, CT and US ab show Cholelithiasis as well as CBD dilation.  ED discussed with surgeon who advised admission to medicine for further evaluation. (13 Feb 2018 23:30)    SUBJECTIVE & OBJECTIVE: Pt seen and examined at bedside. Her pain is well controlled and she is tolerating full liquid diet.  Does complain of some gas.  No acute events overnight.     PHYSICAL EXAM:  T(C): 36.6 (02-19-18 @ 04:58), Max: 36.9 (02-18-18 @ 12:40)  HR: 89 (02-19-18 @ 04:58) (82 - 101)  BP: 160/89 (02-19-18 @ 04:58) (122/60 - 160/89)  RR: 18 (02-19-18 @ 04:58) (17 - 20)  SpO2: 97% (02-19-18 @ 04:58) (96% - 98%)    GENERAL: NAD, well-groomed, well-developed  HEAD:  Atraumatic, Normocephalic  EYES: EOMI, PERRLA, conjunctiva and sclera clear  ENMT: Moist mucous membranes  NECK: Supple, No JVD  NERVOUS SYSTEM:  Alert & Oriented X3, Motor Strength 5/5 B/L upper and lower extremities; DTRs 2+ intact and symmetric  CHEST/LUNG: Clear to auscultation bilaterally; No rales, rhonchi, wheezing, or rubs  HEART: Regular rate and rhythm; No murmurs, rubs, or gallops  ABDOMEN: Soft, Nontender, Nondistended; Bowel sounds present  EXTREMITIES:  2+ Peripheral Pulses, No clubbing, cyanosis, or edema    MEDICATIONS  (STANDING):  atorvastatin 20 milliGRAM(s) Oral at bedtime  clonazePAM Tablet 2 milliGRAM(s) Oral at bedtime  dextrose 5%. 1000 milliLiter(s) (50 mL/Hr) IV Continuous <Continuous>  dextrose 50% Injectable 12.5 Gram(s) IV Push once  dextrose 50% Injectable 25 Gram(s) IV Push once  dextrose 50% Injectable 25 Gram(s) IV Push once  docusate sodium 100 milliGRAM(s) Oral two times a day  heparin  Injectable 5000 Unit(s) SubCutaneous every 12 hours  insulin lispro (HumaLOG) corrective regimen sliding scale   SubCutaneous three times a day before meals  insulin lispro (HumaLOG) corrective regimen sliding scale   SubCutaneous at bedtime  lisinopril 5 milliGRAM(s) Oral daily  nicotine - 21 mG/24Hr(s) Patch 1 patch Transdermal daily  OXcarbazepine 300 milliGRAM(s) Oral two times a day  pantoprazole    Tablet 40 milliGRAM(s) Oral before breakfast  senna 2 Tablet(s) Oral at bedtime  sucralfate 1 Gram(s) Oral every 6 hours  tiotropium 18 MICROgram(s) Capsule 1 Capsule(s) Inhalation daily  traZODone 50 milliGRAM(s) Oral at bedtime    MEDICATIONS  (PRN):  ALBUTerol    90 MICROgram(s) HFA Inhaler 2 Puff(s) Inhalation every 6 hours PRN Shortness of Breath and/or Wheezing  baclofen 10 milliGRAM(s) Oral three times a day PRN Mild pain  dextrose Gel 1 Dose(s) Oral once PRN Blood Glucose LESS THAN 70 milliGRAM(s)/deciliter  diphenhydrAMINE   Injectable 12.5 milliGRAM(s) IV Push every 8 hours PRN itchiness  glucagon  Injectable 1 milliGRAM(s) IntraMuscular once PRN Glucose LESS THAN 70 milligrams/deciliter  meclizine 12.5 milliGRAM(s) Oral three times a day PRN Dizziness  morphine  - Injectable 2 milliGRAM(s) IV Push every 4 hours PRN Severe Pain (7 - 10)  oxyCODONE    5 mG/acetaminophen 325 mG 1 Tablet(s) Oral every 4 hours PRN Mild Pain (1 - 3)  oxyCODONE    5 mG/acetaminophen 325 mG 2 Tablet(s) Oral every 4 hours PRN Moderate Pain (4 - 6)    LABS:                        12.6   7.73  )-----------( 225      ( 19 Feb 2018 07:18 )             36.2     02-19    140  |  103  |  5<L>  ----------------------------<  142<H>  3.6   |  26  |  0.38<L>    Ca    9.0      19 Feb 2018 07:18  Phos  3.5     02-18  Mg     1.8     02-18    TPro  6.6  /  Alb  3.5  /  TBili  0.5  /  DBili  .12  /  AST  21  /  ALT  33  /  AlkPhos  88  02-19  POCT Blood Glucose.: 154 mg/dL (19 Feb 2018 07:09)  POCT Blood Glucose.: 163 mg/dL (18 Feb 2018 21:11)  POCT Blood Glucose.: 179 mg/dL (18 Feb 2018 16:41)    RECENT CULTURES:  pending pathology report    RADIOLOGY & ADDITIONAL TESTS:  < from: CT Abdomen and Pelvis w/ IV Cont (02.13.18 @ 20:46) >  IMPRESSION:     8 mm gallstone at the junction of the gallbladder neckand cystic duct.  Gallbladder is otherwise within normal limits.  CBD dilatation up to 1 cm, possibly secondary to chronic stricture.    < end of copied text >  DVT/GI ppx  Discussed with pt @ bedside

## 2018-02-19 NOTE — PROGRESS NOTE ADULT - SUBJECTIVE AND OBJECTIVE BOX
Patient seen and examined.  She states that her pain is better controlled, however continues to have intermittent nausea and decreased appetite.  Denies passing flatus or having a BM since surgery.    AVSS    NAD  Abdomen - soft, mildly distended, appropriate alina-incisional tenderness, incisions clean with Steri-strips    Labs WNL

## 2018-02-20 DIAGNOSIS — K91.30 POSTPROCEDURAL INTESTINAL OBSTRUCTION, UNSPECIFIED AS TO PARTIAL VERSUS COMPLETE: ICD-10-CM

## 2018-02-20 DIAGNOSIS — F31.9 BIPOLAR DISORDER, UNSPECIFIED: ICD-10-CM

## 2018-02-20 LAB
ANION GAP SERPL CALC-SCNC: 15 MMOL/L — SIGNIFICANT CHANGE UP (ref 5–17)
BUN SERPL-MCNC: 6 MG/DL — LOW (ref 7–23)
CALCIUM SERPL-MCNC: 9 MG/DL — SIGNIFICANT CHANGE UP (ref 8.5–10.1)
CHLORIDE SERPL-SCNC: 99 MMOL/L — SIGNIFICANT CHANGE UP (ref 96–108)
CO2 SERPL-SCNC: 24 MMOL/L — SIGNIFICANT CHANGE UP (ref 22–31)
CREAT SERPL-MCNC: 0.42 MG/DL — LOW (ref 0.5–1.3)
GLUCOSE SERPL-MCNC: 161 MG/DL — HIGH (ref 70–99)
HCT VFR BLD CALC: 40.6 % — SIGNIFICANT CHANGE UP (ref 34.5–45)
HGB BLD-MCNC: 14.2 G/DL — SIGNIFICANT CHANGE UP (ref 11.5–15.5)
MCHC RBC-ENTMCNC: 29.3 PG — SIGNIFICANT CHANGE UP (ref 27–34)
MCHC RBC-ENTMCNC: 35 GM/DL — SIGNIFICANT CHANGE UP (ref 32–36)
MCV RBC AUTO: 83.7 FL — SIGNIFICANT CHANGE UP (ref 80–100)
NRBC # BLD: 0 /100 WBCS — SIGNIFICANT CHANGE UP (ref 0–0)
PLATELET # BLD AUTO: 274 K/UL — SIGNIFICANT CHANGE UP (ref 150–400)
POTASSIUM SERPL-MCNC: 3.4 MMOL/L — LOW (ref 3.5–5.3)
POTASSIUM SERPL-SCNC: 3.4 MMOL/L — LOW (ref 3.5–5.3)
RBC # BLD: 4.85 M/UL — SIGNIFICANT CHANGE UP (ref 3.8–5.2)
RBC # FLD: 12.5 % — SIGNIFICANT CHANGE UP (ref 10.3–14.5)
SODIUM SERPL-SCNC: 138 MMOL/L — SIGNIFICANT CHANGE UP (ref 135–145)
SURGICAL PATHOLOGY FINAL REPORT - CH: SIGNIFICANT CHANGE UP
WBC # BLD: 9.55 K/UL — SIGNIFICANT CHANGE UP (ref 3.8–10.5)
WBC # FLD AUTO: 9.55 K/UL — SIGNIFICANT CHANGE UP (ref 3.8–10.5)

## 2018-02-20 PROCEDURE — 99233 SBSQ HOSP IP/OBS HIGH 50: CPT

## 2018-02-20 PROCEDURE — 74019 RADEX ABDOMEN 2 VIEWS: CPT | Mod: 26

## 2018-02-20 PROCEDURE — 74177 CT ABD & PELVIS W/CONTRAST: CPT | Mod: 26

## 2018-02-20 RX ORDER — ONDANSETRON 8 MG/1
4 TABLET, FILM COATED ORAL EVERY 6 HOURS
Qty: 0 | Refills: 0 | Status: DISCONTINUED | OUTPATIENT
Start: 2018-02-20 | End: 2018-02-23

## 2018-02-20 RX ORDER — SODIUM CHLORIDE 9 MG/ML
1000 INJECTION, SOLUTION INTRAVENOUS
Qty: 0 | Refills: 0 | Status: DISCONTINUED | OUTPATIENT
Start: 2018-02-20 | End: 2018-02-20

## 2018-02-20 RX ORDER — IOHEXOL 300 MG/ML
30 INJECTION, SOLUTION INTRAVENOUS ONCE
Qty: 0 | Refills: 0 | Status: COMPLETED | OUTPATIENT
Start: 2018-02-20 | End: 2018-02-20

## 2018-02-20 RX ORDER — SODIUM CHLORIDE 9 MG/ML
1000 INJECTION, SOLUTION INTRAVENOUS
Qty: 0 | Refills: 0 | Status: DISCONTINUED | OUTPATIENT
Start: 2018-02-20 | End: 2018-02-21

## 2018-02-20 RX ORDER — POTASSIUM CHLORIDE 20 MEQ
40 PACKET (EA) ORAL ONCE
Qty: 0 | Refills: 0 | Status: DISCONTINUED | OUTPATIENT
Start: 2018-02-20 | End: 2018-02-20

## 2018-02-20 RX ADMIN — LISINOPRIL 5 MILLIGRAM(S): 2.5 TABLET ORAL at 05:47

## 2018-02-20 RX ADMIN — Medication 50 MILLIGRAM(S): at 21:34

## 2018-02-20 RX ADMIN — Medication 2: at 07:45

## 2018-02-20 RX ADMIN — Medication 100 MILLIGRAM(S): at 05:47

## 2018-02-20 RX ADMIN — OXCARBAZEPINE 300 MILLIGRAM(S): 300 TABLET, FILM COATED ORAL at 18:30

## 2018-02-20 RX ADMIN — HEPARIN SODIUM 5000 UNIT(S): 5000 INJECTION INTRAVENOUS; SUBCUTANEOUS at 05:46

## 2018-02-20 RX ADMIN — SODIUM CHLORIDE 100 MILLILITER(S): 9 INJECTION, SOLUTION INTRAVENOUS at 18:28

## 2018-02-20 RX ADMIN — MORPHINE SULFATE 2 MILLIGRAM(S): 50 CAPSULE, EXTENDED RELEASE ORAL at 07:14

## 2018-02-20 RX ADMIN — Medication 1 GRAM(S): at 00:09

## 2018-02-20 RX ADMIN — HEPARIN SODIUM 5000 UNIT(S): 5000 INJECTION INTRAVENOUS; SUBCUTANEOUS at 18:29

## 2018-02-20 RX ADMIN — Medication 100 MILLIGRAM(S): at 21:33

## 2018-02-20 RX ADMIN — Medication 1 GRAM(S): at 11:43

## 2018-02-20 RX ADMIN — ONDANSETRON 4 MILLIGRAM(S): 8 TABLET, FILM COATED ORAL at 06:51

## 2018-02-20 RX ADMIN — ATORVASTATIN CALCIUM 20 MILLIGRAM(S): 80 TABLET, FILM COATED ORAL at 21:33

## 2018-02-20 RX ADMIN — Medication 2 MILLIGRAM(S): at 21:33

## 2018-02-20 RX ADMIN — OXYCODONE AND ACETAMINOPHEN 2 TABLET(S): 5; 325 TABLET ORAL at 21:33

## 2018-02-20 RX ADMIN — Medication 1 GRAM(S): at 18:30

## 2018-02-20 RX ADMIN — SENNA PLUS 2 TABLET(S): 8.6 TABLET ORAL at 21:32

## 2018-02-20 RX ADMIN — MORPHINE SULFATE 2 MILLIGRAM(S): 50 CAPSULE, EXTENDED RELEASE ORAL at 12:04

## 2018-02-20 RX ADMIN — MORPHINE SULFATE 2 MILLIGRAM(S): 50 CAPSULE, EXTENDED RELEASE ORAL at 17:10

## 2018-02-20 RX ADMIN — TIOTROPIUM BROMIDE 1 CAPSULE(S): 18 CAPSULE ORAL; RESPIRATORY (INHALATION) at 11:44

## 2018-02-20 RX ADMIN — Medication 1 GRAM(S): at 05:47

## 2018-02-20 RX ADMIN — MORPHINE SULFATE 2 MILLIGRAM(S): 50 CAPSULE, EXTENDED RELEASE ORAL at 16:57

## 2018-02-20 RX ADMIN — SODIUM CHLORIDE 75 MILLILITER(S): 9 INJECTION, SOLUTION INTRAVENOUS at 04:00

## 2018-02-20 RX ADMIN — OXCARBAZEPINE 300 MILLIGRAM(S): 300 TABLET, FILM COATED ORAL at 05:46

## 2018-02-20 RX ADMIN — Medication 1 PATCH: at 11:45

## 2018-02-20 RX ADMIN — MORPHINE SULFATE 2 MILLIGRAM(S): 50 CAPSULE, EXTENDED RELEASE ORAL at 06:58

## 2018-02-20 RX ADMIN — Medication 1 PATCH: at 11:43

## 2018-02-20 RX ADMIN — SODIUM CHLORIDE 100 MILLILITER(S): 9 INJECTION, SOLUTION INTRAVENOUS at 13:10

## 2018-02-20 RX ADMIN — Medication 2: at 11:44

## 2018-02-20 RX ADMIN — MORPHINE SULFATE 2 MILLIGRAM(S): 50 CAPSULE, EXTENDED RELEASE ORAL at 12:15

## 2018-02-20 RX ADMIN — PANTOPRAZOLE SODIUM 40 MILLIGRAM(S): 20 TABLET, DELAYED RELEASE ORAL at 07:46

## 2018-02-20 RX ADMIN — OXYCODONE AND ACETAMINOPHEN 2 TABLET(S): 5; 325 TABLET ORAL at 22:10

## 2018-02-20 NOTE — PROGRESS NOTE ADULT - PROBLEM SELECTOR PROBLEM 6
Schizophrenia, unspecified type
Mixed hyperlipidemia

## 2018-02-20 NOTE — PROGRESS NOTE ADULT - PROBLEM SELECTOR PLAN 3
Continue home medications
Continue home medications
Continue home medications of Trileptal 300 mg bid, Klonipen 2 mg every PM and Trazadone 50 mg daily.
will order HIDA SCAN
Continue home medications of Trileptal 300 mg bid and Klonipen 2 mg every PM.
s/p lap/choly POD 3  surgery reccs appreciated

## 2018-02-20 NOTE — PROGRESS NOTE ADULT - PROBLEM SELECTOR PROBLEM 3
Schizophrenia, unspecified type
Symptomatic cholelithiasis
Schizophrenia, unspecified type
Symptomatic cholelithiasis

## 2018-02-20 NOTE — PROGRESS NOTE ADULT - PROBLEM SELECTOR PLAN 2
Sliding scale only while NPO  Start basal doses as clinically appropriate
Carafate po Q6h   advance diet
Protonix 40mg daily  f/u pathology.
Protonix 40mg daily  f/u pathology. (PENDING)
Sliding scale only while NPO. Start basal doses once tolerating PO diet.
Sliding scale. Start basal doses once consistently tolerating PO diet.  BS today -187
management per surgery
Sliding scale only while NPO. Start basal doses once tolerating PO diet.
c/w carafate and protonix

## 2018-02-20 NOTE — PROGRESS NOTE ADULT - SUBJECTIVE AND OBJECTIVE BOX
Patient seen and examined at bedside complaining of nausea and a lot of belching and flatus. Denies current pain.     Vital Signs Last 24 Hrs  T(F): 98.8 (02-20-18 @ 00:06), Max: 98.8 (02-19-18 @ 19:56)  HR: 99 (02-20-18 @ 00:06)  BP: 159/91 (02-20-18 @ 00:06)  RR: 20 (02-20-18 @ 00:06)  SpO2: 95% (02-20-18 @ 00:06)  POCT Blood Glucose.: 175 mg/dL (20 Feb 2018 07:34)    GENERAL: Alert, NAD  CHEST/LUNG: Clear to auscultation bilaterally, respirations nonlabored  HEART: S1S2, Regular rate and rhythm;   ABDOMEN:  Steri-strips covering port sites c/d/i x4. + Bowel sounds, soft, mild distention. Appropriate alina-incisional tenderness  EXTREMITIES:  no calf tenderness, No edema    I&O's Detail    19 Feb 2018 07:01  -  20 Feb 2018 07:00  --------------------------------------------------------  IN:    lactated ringers.: 450 mL    Oral Fluid: 220 mL  Total IN: 670 mL    OUT:  Total OUT: 0 mL    Total NET: 670 mL    LABS:                        14.2   9.55  )-----------( 274      ( 20 Feb 2018 08:16 )             40.6     02-19    140  |  103  |  5<L>  ----------------------------<  142<H>  3.6   |  26  |  0.38<L>    Ca    9.0      19 Feb 2018 07:18    TPro  6.6  /  Alb  3.5  /  TBili  0.5  /  DBili  .12  /  AST  21  /  ALT  33  /  AlkPhos  88  02-19    52y old Female s/p laparoscopic cholecystectomy secondary to cholelithiasis, chronic biliary obstruction, abdominal pain, POD#3. Found to have gastritis and duodenal ulcer on EGD with PMH Depressed, Schizophrenia, Bipolar 1 disorder, DM, ETOH abuse, Crack cocaine use.    - continue clear liquid diet with IVF  - continue zofran PRN  - continue stool softeners  - continue Protonix and carafate per GI  - DVT prophylaxis, Incentive Spirometer, OOB, Ambulating, pain control  - continue current management per medicine  - will discuss with Dr. Ramos

## 2018-02-20 NOTE — PROGRESS NOTE ADULT - PROBLEM SELECTOR PLAN 1
- c/w clear liquids as tolerated  -  attempt to wean of opioid medications  - will correct electrolyte imbalance  - will follow am electrolyte panel including mg, phos, etc.   - if patient develops vomiting will need NGT

## 2018-02-20 NOTE — PROGRESS NOTE ADULT - PROBLEM SELECTOR PROBLEM 1
Calculus of gallbladder without cholecystitis without obstruction
Calculus of gallbladder without cholecystitis without obstruction
Common bile duct dilation
Duodenal ulcer
Duodenal ulcer
Gastritis and duodenitis
Calculus of gallbladder without cholecystitis without obstruction
Ileus following gastrointestinal surgery
Calculus of gallbladder without cholecystitis without obstruction

## 2018-02-20 NOTE — PROGRESS NOTE ADULT - ATTENDING COMMENTS
Persistent nausea, belching and mild distention.  AXR reveals multiple dilated loops of bowel.  Likely c/w ileus, however will get CT A/P to rule out post-op SBO. Persistent nausea, belching and mild distention.  AXR reveals multiple dilated loops of bowel.  Likely c/w resolving ileus, however will get CT A/P to confirm.

## 2018-02-20 NOTE — PROGRESS NOTE ADULT - SUBJECTIVE AND OBJECTIVE BOX
Patient is a 52y old  Female who presents with a chief complaint of RUQ pain (14 Feb 2018 03:02)        HPI:  52 year old woman with PMH of asthma, HTN, HLD, panic d/o, DM2, bipolar 2, COPD, lung nodules presents complaining of RUQ pain.  She states that for the last month she has had RUQ pain since a CT scan as outpt found gallstone.  The pain has become increasingly worse so her PMD sent her to the hospital.  She denies fever, chills, nausea, vomiting, diarrhea.    In the ED, CT and US ab show Cholelithiasis as well as CBD dilation.  ED discussed with surgeon who advised admission to medicine for further evaluation. (13 Feb 2018 23:30)      SUBJECTIVE & OBJECTIVE: Pt seen and examined at bedside. Continues to have abdominal bloating and discomfort.  + flatus, no BM.      PHYSICAL EXAM:  T(C): 37.1 (02-20-18 @ 00:06), Max: 37.1 (02-19-18 @ 19:56)  HR: 99 (02-20-18 @ 00:06) (99 - 99)  BP: 159/91 (02-20-18 @ 00:06) (146/97 - 173/95)  RR: 20 (02-20-18 @ 00:06) (18 - 20)  SpO2: 95% (02-20-18 @ 00:06) (95% - 96%)    GENERAL: NAD, well-groomed, well-developed  HEAD:  Atraumatic, Normocephalic  EYES: EOMI, PERRLA, conjunctiva and sclera clear  ENMT: Moist mucous membranes  NECK: Supple, No JVD  NERVOUS SYSTEM:  Alert & Oriented X3, Motor Strength 5/5 B/L upper and lower extremities; DTRs 2+ intact and symmetric  CHEST/LUNG: Clear to auscultation bilaterally; No rales, rhonchi, wheezing, or rubs  HEART: Regular rate and rhythm; No murmurs, rubs, or gallops  ABDOMEN: alina-incisional tenderness, tympanic abdomen.   EXTREMITIES:  2+ Peripheral Pulses, No clubbing, cyanosis, or edema    MEDICATIONS  (STANDING):  atorvastatin 20 milliGRAM(s) Oral at bedtime  clonazePAM Tablet 2 milliGRAM(s) Oral at bedtime  dextrose 5%. 1000 milliLiter(s) (50 mL/Hr) IV Continuous <Continuous>  dextrose 50% Injectable 12.5 Gram(s) IV Push once  dextrose 50% Injectable 25 Gram(s) IV Push once  dextrose 50% Injectable 25 Gram(s) IV Push once  docusate sodium 100 milliGRAM(s) Oral three times a day  heparin  Injectable 5000 Unit(s) SubCutaneous every 12 hours  insulin lispro (HumaLOG) corrective regimen sliding scale   SubCutaneous three times a day before meals  insulin lispro (HumaLOG) corrective regimen sliding scale   SubCutaneous at bedtime  iohexol 300 mG (iodine)/mL Oral Solution 30 milliLiter(s) Oral once  lactated ringers. 1000 milliLiter(s) (100 mL/Hr) IV Continuous <Continuous>  lisinopril 5 milliGRAM(s) Oral daily  nicotine - 21 mG/24Hr(s) Patch 1 patch Transdermal daily  OXcarbazepine 300 milliGRAM(s) Oral two times a day  pantoprazole    Tablet 40 milliGRAM(s) Oral before breakfast  senna 2 Tablet(s) Oral at bedtime  sucralfate 1 Gram(s) Oral every 6 hours  tiotropium 18 MICROgram(s) Capsule 1 Capsule(s) Inhalation daily  traZODone 50 milliGRAM(s) Oral at bedtime    MEDICATIONS  (PRN):  ALBUTerol    90 MICROgram(s) HFA Inhaler 2 Puff(s) Inhalation every 6 hours PRN Shortness of Breath and/or Wheezing  baclofen 10 milliGRAM(s) Oral three times a day PRN Mild pain  dextrose Gel 1 Dose(s) Oral once PRN Blood Glucose LESS THAN 70 milliGRAM(s)/deciliter  diphenhydrAMINE   Injectable 12.5 milliGRAM(s) IV Push every 8 hours PRN itchiness  glucagon  Injectable 1 milliGRAM(s) IntraMuscular once PRN Glucose LESS THAN 70 milligrams/deciliter  meclizine 12.5 milliGRAM(s) Oral three times a day PRN Dizziness  morphine  - Injectable 2 milliGRAM(s) IV Push every 4 hours PRN Severe Pain (7 - 10)  ondansetron Injectable 4 milliGRAM(s) IV Push every 6 hours PRN Nausea and/or Vomiting  oxyCODONE    5 mG/acetaminophen 325 mG 1 Tablet(s) Oral every 4 hours PRN Mild Pain (1 - 3)  oxyCODONE    5 mG/acetaminophen 325 mG 2 Tablet(s) Oral every 4 hours PRN Moderate Pain (4 - 6)      LABS:                        14.2   9.55  )-----------( 274      ( 20 Feb 2018 08:16 )             40.6     02-20    138  |  99  |  6<L>  ----------------------------<  161<H>  3.4<L>   |  24  |  0.42<L>    Ca    9.0      20 Feb 2018 08:16    TPro  6.6  /  Alb  3.5  /  TBili  0.5  /  DBili  .12  /  AST  21  /  ALT  33  /  AlkPhos  88  02-19    POCT Blood Glucose.: 164 mg/dL (20 Feb 2018 11:36)  POCT Blood Glucose.: 175 mg/dL (20 Feb 2018 07:34)  POCT Blood Glucose.: 166 mg/dL (19 Feb 2018 22:03)  POCT Blood Glucose.: 161 mg/dL (19 Feb 2018 16:34)    RECENT CULTURES:  none pending  RADIOLOGY & ADDITIONAL TESTS:  none pending    DVT/GI ppx  Discussed with pt @ bedside

## 2018-02-20 NOTE — PROGRESS NOTE ADULT - PROBLEM SELECTOR PROBLEM 5
Bipolar 1 disorder
Mild persistent asthma without complication

## 2018-02-20 NOTE — PROGRESS NOTE ADULT - PROBLEM SELECTOR PROBLEM 4
Type 2 diabetes mellitus with diabetic polyneuropathy, without long-term current use of insulin
Bipolar 2 disorder

## 2018-02-20 NOTE — PROGRESS NOTE ADULT - PROBLEM SELECTOR PROBLEM 2
Type 2 diabetes mellitus with diabetic polyneuropathy, without long-term current use of insulin
Duodenal ulcer
Gastritis and duodenitis
Gastritis and duodenitis
Symptomatic cholelithiasis
Type 2 diabetes mellitus with diabetic polyneuropathy, without long-term current use of insulin
Gastritis and duodenitis

## 2018-02-21 PROBLEM — Z00.00 ENCOUNTER FOR PREVENTIVE HEALTH EXAMINATION: Status: ACTIVE | Noted: 2018-02-21

## 2018-02-21 LAB
ALBUMIN SERPL ELPH-MCNC: 3 G/DL — LOW (ref 3.3–5)
ALP SERPL-CCNC: 80 U/L — SIGNIFICANT CHANGE UP (ref 40–120)
ALT FLD-CCNC: 26 U/L — SIGNIFICANT CHANGE UP (ref 12–78)
ANION GAP SERPL CALC-SCNC: 9 MMOL/L — SIGNIFICANT CHANGE UP (ref 5–17)
AST SERPL-CCNC: 22 U/L — SIGNIFICANT CHANGE UP (ref 15–37)
BILIRUB DIRECT SERPL-MCNC: 0.07 MG/DL — SIGNIFICANT CHANGE UP (ref 0.05–0.2)
BILIRUB INDIRECT FLD-MCNC: 0.7 MG/DL — SIGNIFICANT CHANGE UP (ref 0.2–1)
BILIRUB SERPL-MCNC: 0.8 MG/DL — SIGNIFICANT CHANGE UP (ref 0.2–1.2)
BUN SERPL-MCNC: 6 MG/DL — LOW (ref 7–23)
CALCIUM SERPL-MCNC: 8.6 MG/DL — SIGNIFICANT CHANGE UP (ref 8.5–10.1)
CHLORIDE SERPL-SCNC: 106 MMOL/L — SIGNIFICANT CHANGE UP (ref 96–108)
CO2 SERPL-SCNC: 26 MMOL/L — SIGNIFICANT CHANGE UP (ref 22–31)
CREAT SERPL-MCNC: 0.35 MG/DL — LOW (ref 0.5–1.3)
GLUCOSE SERPL-MCNC: 185 MG/DL — HIGH (ref 70–99)
HCT VFR BLD CALC: 36 % — SIGNIFICANT CHANGE UP (ref 34.5–45)
HGB BLD-MCNC: 12.7 G/DL — SIGNIFICANT CHANGE UP (ref 11.5–15.5)
MAGNESIUM SERPL-MCNC: 1.9 MG/DL — SIGNIFICANT CHANGE UP (ref 1.6–2.6)
MCHC RBC-ENTMCNC: 29.3 PG — SIGNIFICANT CHANGE UP (ref 27–34)
MCHC RBC-ENTMCNC: 35.3 GM/DL — SIGNIFICANT CHANGE UP (ref 32–36)
MCV RBC AUTO: 83.1 FL — SIGNIFICANT CHANGE UP (ref 80–100)
NRBC # BLD: 0 /100 WBCS — SIGNIFICANT CHANGE UP (ref 0–0)
PHOSPHATE SERPL-MCNC: 3.5 MG/DL — SIGNIFICANT CHANGE UP (ref 2.5–4.5)
PLATELET # BLD AUTO: 244 K/UL — SIGNIFICANT CHANGE UP (ref 150–400)
POTASSIUM SERPL-MCNC: 4 MMOL/L — SIGNIFICANT CHANGE UP (ref 3.5–5.3)
POTASSIUM SERPL-SCNC: 4 MMOL/L — SIGNIFICANT CHANGE UP (ref 3.5–5.3)
PROT SERPL-MCNC: 6.1 GM/DL — SIGNIFICANT CHANGE UP (ref 6–8.3)
RBC # BLD: 4.33 M/UL — SIGNIFICANT CHANGE UP (ref 3.8–5.2)
RBC # FLD: 12.7 % — SIGNIFICANT CHANGE UP (ref 10.3–14.5)
SODIUM SERPL-SCNC: 141 MMOL/L — SIGNIFICANT CHANGE UP (ref 135–145)
WBC # BLD: 6.7 K/UL — SIGNIFICANT CHANGE UP (ref 3.8–10.5)
WBC # FLD AUTO: 6.7 K/UL — SIGNIFICANT CHANGE UP (ref 3.8–10.5)

## 2018-02-21 PROCEDURE — 99233 SBSQ HOSP IP/OBS HIGH 50: CPT

## 2018-02-21 RX ORDER — LACTULOSE 10 G/15ML
20 SOLUTION ORAL ONCE
Qty: 0 | Refills: 0 | Status: COMPLETED | OUTPATIENT
Start: 2018-02-21 | End: 2018-02-21

## 2018-02-21 RX ORDER — SODIUM CHLORIDE 9 MG/ML
1000 INJECTION, SOLUTION INTRAVENOUS
Qty: 0 | Refills: 0 | Status: DISCONTINUED | OUTPATIENT
Start: 2018-02-21 | End: 2018-02-22

## 2018-02-21 RX ADMIN — OXYCODONE AND ACETAMINOPHEN 1 TABLET(S): 5; 325 TABLET ORAL at 21:01

## 2018-02-21 RX ADMIN — Medication 100 MILLIGRAM(S): at 13:46

## 2018-02-21 RX ADMIN — Medication 2 MILLIGRAM(S): at 22:11

## 2018-02-21 RX ADMIN — LACTULOSE 20 GRAM(S): 10 SOLUTION ORAL at 13:15

## 2018-02-21 RX ADMIN — OXCARBAZEPINE 300 MILLIGRAM(S): 300 TABLET, FILM COATED ORAL at 17:22

## 2018-02-21 RX ADMIN — HEPARIN SODIUM 5000 UNIT(S): 5000 INJECTION INTRAVENOUS; SUBCUTANEOUS at 05:24

## 2018-02-21 RX ADMIN — PANTOPRAZOLE SODIUM 40 MILLIGRAM(S): 20 TABLET, DELAYED RELEASE ORAL at 07:31

## 2018-02-21 RX ADMIN — Medication 50 MILLIGRAM(S): at 22:11

## 2018-02-21 RX ADMIN — Medication 4: at 11:14

## 2018-02-21 RX ADMIN — Medication 1 PATCH: at 11:14

## 2018-02-21 RX ADMIN — Medication 1 GRAM(S): at 00:23

## 2018-02-21 RX ADMIN — Medication 1 GRAM(S): at 23:35

## 2018-02-21 RX ADMIN — Medication 4: at 16:01

## 2018-02-21 RX ADMIN — SODIUM CHLORIDE 100 MILLILITER(S): 9 INJECTION, SOLUTION INTRAVENOUS at 21:04

## 2018-02-21 RX ADMIN — ONDANSETRON 4 MILLIGRAM(S): 8 TABLET, FILM COATED ORAL at 09:16

## 2018-02-21 RX ADMIN — Medication 1 GRAM(S): at 11:14

## 2018-02-21 RX ADMIN — Medication 100 MILLIGRAM(S): at 05:24

## 2018-02-21 RX ADMIN — SENNA PLUS 2 TABLET(S): 8.6 TABLET ORAL at 22:11

## 2018-02-21 RX ADMIN — Medication 1 GRAM(S): at 17:22

## 2018-02-21 RX ADMIN — ATORVASTATIN CALCIUM 20 MILLIGRAM(S): 80 TABLET, FILM COATED ORAL at 22:11

## 2018-02-21 RX ADMIN — Medication 1 GRAM(S): at 05:24

## 2018-02-21 RX ADMIN — OXCARBAZEPINE 300 MILLIGRAM(S): 300 TABLET, FILM COATED ORAL at 05:24

## 2018-02-21 RX ADMIN — OXYCODONE AND ACETAMINOPHEN 1 TABLET(S): 5; 325 TABLET ORAL at 21:50

## 2018-02-21 RX ADMIN — LISINOPRIL 5 MILLIGRAM(S): 2.5 TABLET ORAL at 05:24

## 2018-02-21 RX ADMIN — Medication 100 MILLIGRAM(S): at 22:11

## 2018-02-21 RX ADMIN — SODIUM CHLORIDE 100 MILLILITER(S): 9 INJECTION, SOLUTION INTRAVENOUS at 09:16

## 2018-02-21 NOTE — PROGRESS NOTE ADULT - SUBJECTIVE AND OBJECTIVE BOX
INTERVAL HPI/OVERNIGHT EVENTS:  Pt. seen and examined at bedside resting comfortably. Patient continues to c/o pain and nausea with PO intake, states she does not want to eat. Denies current nausea or vomiting. States she is passing a lot of flatus and still belching frequently. No BM yet. Ambulating and voiding well.  Denies fever/chills, chest pain, dyspnea, cough, dizziness.     Vital Signs Last 24 Hrs  T(C): 36.6 (21 Feb 2018 05:10), Max: 36.8 (20 Feb 2018 18:27)  T(F): 97.8 (21 Feb 2018 05:10), Max: 98.3 (20 Feb 2018 18:27)  HR: 96 (21 Feb 2018 05:10) (90 - 96)  BP: 161/86 (21 Feb 2018 05:10) (130/75 - 161/86)  RR: 18 (21 Feb 2018 05:10) (17 - 20)  SpO2: 95% (21 Feb 2018 05:10) (94% - 95%)    PHYSICAL EXAM:    GENERAL: NAD  CHEST/LUNG: Clear to ausculation, bilaterally   HEART: S1S2, RRR  ABDOMEN: Steri strips over port sites C/D/I; mild distention, +BS, soft, tenderness to incisional area, no guarding  EXTREMITIES:  calf soft, non tender b/l. 2+ distal pulses b/l.     I&O's Detail    20 Feb 2018 07:01  -  21 Feb 2018 07:00  --------------------------------------------------------  IN:    dextrose 5% + sodium chloride 0.9%: 1200 mL    Oral Fluid: 45 mL  Total IN: 1245 mL    OUT:    Voided: 3 mL  Total OUT: 3 mL    Total NET: 1242 mL    LABS:                        12.7   6.70  )-----------( 244      ( 21 Feb 2018 08:07 )             36.0     02-21    141  |  106  |  6<L>  ----------------------------<  185<H>  4.0   |  26  |  0.35<L>    Ca    8.6      21 Feb 2018 08:07  Phos  3.5     02-21  Mg     1.9     02-21    TPro  6.1  /  Alb  3.0<L>  /  TBili  0.8  /  DBili  .07  /  AST  22  /  ALT  26  /  AlkPhos  80  02-21    RADIOLOGY & ADDITIONAL STUDIES:  < from: CT Abdomen and Pelvis w/ Oral Cont and w/ IV Cont (02.20.18 @ 17:27) >  IMPRESSION:    Nonspecific areas of gaseous distention of the colon. No small bowel   obstruction.    Interval cholecystectomy with intra-abdominal and pelvic ascites.    Indeterminant 10 mm lesion within the right kidney. Not well   characterized on most recent MRI of 2/14/2018. Dedicated MRI of the   kidneys is recommended with contrast, for further evaluation, if no   contraindications exist.    ALONZO SCHUTLZ M.D., ATTENDING RADIOLOGIST  This document has been electronically signed. Feb 20 2018  5:59PM    < end of copied text >      Assessment: 51yo Female s/p laparoscopic cholecystectomy secondary to cholelithiasis, chronic biliary obstruction, abdominal pain, POD#4. Found to have gastritis and duodenal ulcer on EGD with PMH Depressed, Schizophrenia, Bipolar 1 disorder, DM, ETOH abuse, Crack cocaine use.  CT AP- non specific gaseous distention     Plan:  -Per Dr. Ramos, no acute surgical issues at present, please continue management per GI.   -continue postop prophylactic measures, OOB, Ambulate, IS, DVT ppx  -Continue current medical management, pain control prn, bowel regimen prn  -Surgery will sign off at this time, please reconsult if needed -- Patient to follow up with surgeon in office in 1-2 weeks  Thanks

## 2018-02-21 NOTE — PROGRESS NOTE ADULT - SUBJECTIVE AND OBJECTIVE BOX
Patient is a 52y old  Female who presents with a chief complaint of RUQ pain (14 Feb 2018 03:02)        HPI:  52 year old woman with PMH of asthma, HTN, HLD, panic d/o, DM2, bipolar 2, COPD, lung nodules presents complaining of RUQ pain.  She states that for the last month she has had RUQ pain since a CT scan as outpt found gallstone.  The pain has become increasingly worse so her PMD sent her to the hospital.  She denies fever, chills, nausea, vomiting, diarrhea.    In the ED, CT and US ab show Cholelithiasis as well as CBD dilation.  ED discussed with surgeon who advised admission to medicine for further evaluation. (13 Feb 2018 23:30)      SUBJECTIVE & OBJECTIVE: Pt seen and examined at bedside.     PHYSICAL EXAM:  T(C): 36.6 (02-21-18 @ 05:10), Max: 36.8 (02-20-18 @ 18:27)  HR: 96 (02-21-18 @ 05:10) (90 - 96)  BP: 161/86 (02-21-18 @ 05:10) (130/75 - 161/86)  RR: 18 (02-21-18 @ 05:10) (17 - 20)  SpO2: 95% (02-21-18 @ 05:10) (94% - 95%)    GENERAL: NAD, well-groomed, well-developed  HEAD:  Atraumatic, Normocephalic  EYES: EOMI, PERRLA, conjunctiva and sclera clear  ENMT: Moist mucous membranes  NECK: Supple, No JVD  NERVOUS SYSTEM:  Alert & Oriented X3, Motor Strength 5/5 B/L upper and lower extremities; DTRs 2+ intact and symmetric  CHEST/LUNG: Clear to auscultation bilaterally; No rales, rhonchi, wheezing, or rubs  HEART: Regular rate and rhythm; No murmurs, rubs, or gallops  ABDOMEN: Soft, +BS in all four quadrants, alina incisional tenderness.   EXTREMITIES:  2+ Peripheral Pulses, No clubbing, cyanosis, or edema        MEDICATIONS  (STANDING):  atorvastatin 20 milliGRAM(s) Oral at bedtime  clonazePAM Tablet 2 milliGRAM(s) Oral at bedtime  dextrose 5% + sodium chloride 0.45%. 1000 milliLiter(s) (100 mL/Hr) IV Continuous <Continuous>  dextrose 5%. 1000 milliLiter(s) (50 mL/Hr) IV Continuous <Continuous>  dextrose 50% Injectable 12.5 Gram(s) IV Push once  dextrose 50% Injectable 25 Gram(s) IV Push once  dextrose 50% Injectable 25 Gram(s) IV Push once  docusate sodium 100 milliGRAM(s) Oral three times a day  heparin  Injectable 5000 Unit(s) SubCutaneous every 12 hours  insulin lispro (HumaLOG) corrective regimen sliding scale   SubCutaneous three times a day before meals  insulin lispro (HumaLOG) corrective regimen sliding scale   SubCutaneous at bedtime  lactulose Syrup 20 Gram(s) Oral once  lisinopril 5 milliGRAM(s) Oral daily  nicotine - 21 mG/24Hr(s) Patch 1 patch Transdermal daily  OXcarbazepine 300 milliGRAM(s) Oral two times a day  pantoprazole    Tablet 40 milliGRAM(s) Oral before breakfast  senna 2 Tablet(s) Oral at bedtime  sucralfate 1 Gram(s) Oral every 6 hours  tiotropium 18 MICROgram(s) Capsule 1 Capsule(s) Inhalation daily  traZODone 50 milliGRAM(s) Oral at bedtime    MEDICATIONS  (PRN):  ALBUTerol    90 MICROgram(s) HFA Inhaler 2 Puff(s) Inhalation every 6 hours PRN Shortness of Breath and/or Wheezing  baclofen 10 milliGRAM(s) Oral three times a day PRN Mild pain  dextrose Gel 1 Dose(s) Oral once PRN Blood Glucose LESS THAN 70 milliGRAM(s)/deciliter  diphenhydrAMINE   Injectable 12.5 milliGRAM(s) IV Push every 8 hours PRN itchiness  glucagon  Injectable 1 milliGRAM(s) IntraMuscular once PRN Glucose LESS THAN 70 milligrams/deciliter  meclizine 12.5 milliGRAM(s) Oral three times a day PRN Dizziness  morphine  - Injectable 2 milliGRAM(s) IV Push every 4 hours PRN Severe Pain (7 - 10)  ondansetron Injectable 4 milliGRAM(s) IV Push every 6 hours PRN Nausea and/or Vomiting  oxyCODONE    5 mG/acetaminophen 325 mG 1 Tablet(s) Oral every 4 hours PRN Mild Pain (1 - 3)  oxyCODONE    5 mG/acetaminophen 325 mG 2 Tablet(s) Oral every 4 hours PRN Moderate Pain (4 - 6)      LABS:                        12.7   6.70  )-----------( 244      ( 21 Feb 2018 08:07 )             36.0     02-21    Surgical Pathology Final Report -  (02.15.18 @ 16:07)    Surgical Pathology Final Report - :   ACCESSION No:  [id number]    SHIV OLIVERA                  1        Surgical Final Report    Final Diagnosis  1. Stomach, antrum; endoscopic biopsy:  - Mild chronic antral gastritis with reactive changes  - Negative for Helicobacterpylori (cresyl violet stain)    2. Stomach, fundus; endoscopic biopsy:  - Fundic mucosa with mild chronic gastritis  - Negative for Helicobacter pylori (cresyl violet stain)    Melisa Herrera MD  (Electronic Signature)  Reported on: 02/20/18    Synoptic Summary  _  .    Clinical Information  Abdominal pain  Operation/procedure; gastroscopy    141  |  106  |  6<L>  ----------------------------<  185<H>  4.0   |  26  |  0.35<L>    Ca    8.6      21 Feb 2018 08:07    POCT Blood Glucose.: 191 mg/dL (21 Feb 2018 07:30)  POCT Blood Glucose.: 180 mg/dL (20 Feb 2018 21:12)  POCT Blood Glucose.: 154 mg/dL (20 Feb 2018 15:49)  POCT Blood Glucose.: 164 mg/dL (20 Feb 2018 11:36)    RADIOLOGY & ADDITIONAL TESTS:    < from: CT Abdomen and Pelvis w/ Oral Cont and w/ IV Cont (02.20.18 @ 17:27) >  Nonspecific areas of gaseous distention of the colon. No small bowel   obstruction.    Interval cholecystectomy with intra-abdominal and pelvic ascites.    Indeterminant 10 mm lesion within the right kidney. Not well   characterized on most recent MRI of 2/14/2018. Dedicated MRI of the   kidneys is recommended with contrast, for further evaluation, if no   contraindications exist.    < end of copied text >    DVT/GI ppx  Discussed with pt @ bedside

## 2018-02-21 NOTE — PROGRESS NOTE ADULT - ATTENDING COMMENTS
CT scan report and images reviewed.  There is no bowel obstruction or evidence of ileus.  Labs remain normal.  There is no persistent surgical issue.  Recommend GI follow-up for management of her GI symptoms.  Please call with questions. CT scan report and images reviewed.  There is no bowel obstruction or evidence of ileus.  The "ascites" visualized on CT appears to be residual intra-operative irrigation fluid which will absorb spontaneously.  Labs remain normal.  There is no persistent surgical issue.  Recommend GI follow-up for management of her GI symptoms.  Please call with questions.

## 2018-02-22 ENCOUNTER — TRANSCRIPTION ENCOUNTER (OUTPATIENT)
Age: 53
End: 2018-02-22

## 2018-02-22 PROCEDURE — 99233 SBSQ HOSP IP/OBS HIGH 50: CPT

## 2018-02-22 RX ORDER — DOCUSATE SODIUM 100 MG
1 CAPSULE ORAL
Qty: 90 | Refills: 0
Start: 2018-02-22 | End: 2018-03-23

## 2018-02-22 RX ORDER — SENNA PLUS 8.6 MG/1
2 TABLET ORAL
Qty: 60 | Refills: 0
Start: 2018-02-22 | End: 2018-03-23

## 2018-02-22 RX ORDER — ONDANSETRON 8 MG/1
4 TABLET, FILM COATED ORAL EVERY 6 HOURS
Qty: 0 | Refills: 0 | Status: DISCONTINUED | OUTPATIENT
Start: 2018-02-22 | End: 2018-02-22

## 2018-02-22 RX ORDER — PANTOPRAZOLE SODIUM 20 MG/1
1 TABLET, DELAYED RELEASE ORAL
Qty: 30 | Refills: 0
Start: 2018-02-22 | End: 2018-03-23

## 2018-02-22 RX ADMIN — Medication 4: at 16:00

## 2018-02-22 RX ADMIN — SODIUM CHLORIDE 100 MILLILITER(S): 9 INJECTION, SOLUTION INTRAVENOUS at 07:05

## 2018-02-22 RX ADMIN — HEPARIN SODIUM 5000 UNIT(S): 5000 INJECTION INTRAVENOUS; SUBCUTANEOUS at 05:21

## 2018-02-22 RX ADMIN — TIOTROPIUM BROMIDE 1 CAPSULE(S): 18 CAPSULE ORAL; RESPIRATORY (INHALATION) at 11:01

## 2018-02-22 RX ADMIN — OXCARBAZEPINE 300 MILLIGRAM(S): 300 TABLET, FILM COATED ORAL at 05:21

## 2018-02-22 RX ADMIN — Medication 2 MILLIGRAM(S): at 21:21

## 2018-02-22 RX ADMIN — ONDANSETRON 4 MILLIGRAM(S): 8 TABLET, FILM COATED ORAL at 11:57

## 2018-02-22 RX ADMIN — Medication 1 GRAM(S): at 11:01

## 2018-02-22 RX ADMIN — ATORVASTATIN CALCIUM 20 MILLIGRAM(S): 80 TABLET, FILM COATED ORAL at 21:21

## 2018-02-22 RX ADMIN — PANTOPRAZOLE SODIUM 40 MILLIGRAM(S): 20 TABLET, DELAYED RELEASE ORAL at 07:21

## 2018-02-22 RX ADMIN — Medication 4: at 07:21

## 2018-02-22 RX ADMIN — Medication 100 MILLIGRAM(S): at 05:21

## 2018-02-22 RX ADMIN — Medication 50 MILLIGRAM(S): at 21:21

## 2018-02-22 RX ADMIN — Medication 1 GRAM(S): at 05:21

## 2018-02-22 RX ADMIN — LISINOPRIL 5 MILLIGRAM(S): 2.5 TABLET ORAL at 05:21

## 2018-02-22 RX ADMIN — OXCARBAZEPINE 300 MILLIGRAM(S): 300 TABLET, FILM COATED ORAL at 16:00

## 2018-02-22 RX ADMIN — Medication 1 PATCH: at 11:04

## 2018-02-22 RX ADMIN — Medication 2: at 11:00

## 2018-02-22 RX ADMIN — Medication 100 MILLIGRAM(S): at 21:21

## 2018-02-22 NOTE — CHART NOTE - NSCHARTNOTEFT_GEN_A_CORE
Assessment: s/p lap renata 2/17 secondary to cholelithiasis POD #5. no evidence of ileus as per surgery. +BM x3 2/21     Factors impacting intake: [ ] none [ x] nausea  [ ] vomiting [ ] diarrhea [ ] constipation  [ ]chewing problems [ ] swallowing issues  [ ] other:     Diet Presciption: Diet, Consistent Carbohydrate w/Evening Snack:   Low Fat (LOWFAT) (02-22-18 @ 09:17)    Intake: pt reports shes hungry. denies N/V. wasn't touching breakfast though. she said she had enough of clears. diet was just advanced to solids by MD, new breakfast tray ordered.     Current Weight: 78.5 kg (2/19)  % Weight Change: gained 2.1 kg x 5 days (2.5%)     Pertinent Medications: MEDICATIONS  (STANDING):  atorvastatin 20 milliGRAM(s) Oral at bedtime  clonazePAM Tablet 2 milliGRAM(s) Oral at bedtime  dextrose 5% + sodium chloride 0.45%. 1000 milliLiter(s) (100 mL/Hr) IV Continuous <Continuous>  dextrose 5%. 1000 milliLiter(s) (50 mL/Hr) IV Continuous <Continuous>  dextrose 50% Injectable 12.5 Gram(s) IV Push once  dextrose 50% Injectable 25 Gram(s) IV Push once  dextrose 50% Injectable 25 Gram(s) IV Push once  docusate sodium 100 milliGRAM(s) Oral three times a day  heparin  Injectable 5000 Unit(s) SubCutaneous every 12 hours  insulin lispro (HumaLOG) corrective regimen sliding scale   SubCutaneous three times a day before meals  insulin lispro (HumaLOG) corrective regimen sliding scale   SubCutaneous at bedtime  lisinopril 5 milliGRAM(s) Oral daily  nicotine - 21 mG/24Hr(s) Patch 1 patch Transdermal daily  OXcarbazepine 300 milliGRAM(s) Oral two times a day  pantoprazole    Tablet 40 milliGRAM(s) Oral before breakfast  senna 2 Tablet(s) Oral at bedtime  sodium biphosphate Rectal Enema 1 Enema Rectal once  sucralfate 1 Gram(s) Oral every 6 hours  tiotropium 18 MICROgram(s) Capsule 1 Capsule(s) Inhalation daily  traZODone 50 milliGRAM(s) Oral at bedtime    MEDICATIONS  (PRN):  ALBUTerol    90 MICROgram(s) HFA Inhaler 2 Puff(s) Inhalation every 6 hours PRN Shortness of Breath and/or Wheezing  baclofen 10 milliGRAM(s) Oral three times a day PRN Mild pain  dextrose Gel 1 Dose(s) Oral once PRN Blood Glucose LESS THAN 70 milliGRAM(s)/deciliter  diphenhydrAMINE   Injectable 12.5 milliGRAM(s) IV Push every 8 hours PRN itchiness  glucagon  Injectable 1 milliGRAM(s) IntraMuscular once PRN Glucose LESS THAN 70 milligrams/deciliter  meclizine 12.5 milliGRAM(s) Oral three times a day PRN Dizziness  morphine  - Injectable 2 milliGRAM(s) IV Push every 4 hours PRN Severe Pain (7 - 10)  ondansetron Injectable 4 milliGRAM(s) IV Push every 6 hours PRN Nausea and/or Vomiting  oxyCODONE    5 mG/acetaminophen 325 mG 1 Tablet(s) Oral every 4 hours PRN Mild Pain (1 - 3)    Pertinent Labs: (2/18) HgA1c 7.3% (2/21) BUN 6L, Creat 0.35L, K 4, Phos 3.5   CAPILLARY BLOOD GLUCOSE      POCT Blood Glucose.: 217 mg/dL (22 Feb 2018 07:21)  POCT Blood Glucose.: 181 mg/dL (21 Feb 2018 22:08)  POCT Blood Glucose.: 239 mg/dL (21 Feb 2018 15:44)  POCT Blood Glucose.: 241 mg/dL (21 Feb 2018 10:36)    Skin: WDL. no edema noted    Estimated Needs:   [x ] no change since previous assessment  [ ] recalculated:     Previous Nutrition Diagnosis:   [ ] Inadequate Energy Intake [ ]Inadequate Oral Intake [ ] Excessive Energy Intake   [ ] Underweight [ ] Increased Nutrient Needs [ ] Overweight/Obesity   [ ] Altered GI Function [ ] Unintended Weight Loss [x ] Food & Nutrition Related Knowledge Deficit [ ] Malnutrition     Nutrition Diagnosis is [x ] ongoing  [ ] resolved [ ] not applicable     New Nutrition Diagnosis: [x ] not applicable       Interventions:   Recommend  [ ] Change Diet To:  [ ] Nutrition Supplement  [ ] Nutrition Support  [x ] Other: reviewed nutrition education pt received from  last week. pt said it was overwhelming and too many things to change. discussed small changes, one step at a time that pt can take once she leaves hospital     Monitoring and Evaluation:   [x ] PO intake [ x ] Tolerance to diet prescription [ x ] weights [ x ] labs[ x ] follow up per protocol  [ ] other:

## 2018-02-22 NOTE — DISCHARGE NOTE ADULT - CARE PROVIDER_API CALL
Fabian Ramos), Surgery  [address]  [address]  [place] [address]  Phone: [phone]  Fax: [phone]    Chester Floyd), Medicine  [address] [facility] [facility]  [address] [place] [address]  Phone: [phone]  Fax: [phone]

## 2018-02-22 NOTE — DISCHARGE NOTE ADULT - HOSPITAL COURSE
Patient is a 52y old  Female who presents with a chief complaint of RUQ pain (22 Feb 2018 12:03)        HPI:  52 year old woman with PMH of asthma, HTN, HLD, panic d/o, DM2, bipolar 2, COPD, lung nodules presents complaining of RUQ pain.  She states that for the last month she has had RUQ pain since a CT scan as outpt found gallstone.  The pain has become increasingly worse so her PMD sent her to the hospital.  She denies fever, chills, nausea, vomiting, diarrhea.    In the ED, CT and US ab show Cholelithiasis as well as CBD dilation.  ED discussed with surgeon who advised admission to medicine for further evaluation. (13 Feb 2018 23:30)  SUBJECTIVE & OBJECTIVE: Pt seen and examined at bedside. She is POD #4 of Lap/Choly.  Patient has been non-compliant with diet during her hospital stay, consuming fatty foods brought from home which have provoked nausea and vomiting.  She also developed post-operative ileus which is now resolved.  She is doing well at time of discharge, eating low fat consistent carbohydrate diet.      PHYSICAL EXAM:  T(C): 36.6 (02-23-18 @ 05:28), Max: 37.2 (02-22-18 @ 16:47)  HR: 88 (02-23-18 @ 05:28) (80 - 101)  BP: 162/92 (02-23-18 @ 05:28) (157/91 - 183/99)  RR: 16 (02-23-18 @ 05:28) (16 - 20)  SpO2: 96% (02-23-18 @ 05:28) (95% - 97%)  ABDOMEN: Soft, Nontender, Nondistended; Bowel sounds present  EXTREMITIES:  2+ Peripheral Pulses, No clubbing, cyanosis, or edema    MEDICATIONS  (STANDING):  atorvastatin 20 milliGRAM(s) Oral at bedtime  clonazePAM Tablet 2 milliGRAM(s) Oral at bedtime  dextrose 5%. 1000 milliLiter(s) (50 mL/Hr) IV Continuous <Continuous>  dextrose 50% Injectable 12.5 Gram(s) IV Push once  dextrose 50% Injectable 25 Gram(s) IV Push once  dextrose 50% Injectable 25 Gram(s) IV Push once  docusate sodium 100 milliGRAM(s) Oral three times a day  heparin  Injectable 5000 Unit(s) SubCutaneous every 12 hours  insulin lispro (HumaLOG) corrective regimen sliding scale   SubCutaneous three times a day before meals  insulin lispro (HumaLOG) corrective regimen sliding scale   SubCutaneous at bedtime  lisinopril 5 milliGRAM(s) Oral daily  nicotine - 21 mG/24Hr(s) Patch 1 patch Transdermal daily  OXcarbazepine 300 milliGRAM(s) Oral two times a day  pantoprazole    Tablet 40 milliGRAM(s) Oral before breakfast  senna 2 Tablet(s) Oral at bedtime  sodium biphosphate Rectal Enema 1 Enema Rectal once  tiotropium 18 MICROgram(s) Capsule 1 Capsule(s) Inhalation daily  traZODone 50 milliGRAM(s) Oral at bedtime  MEDICATIONS  (PRN):  ALBUTerol    90 MICROgram(s) HFA Inhaler 2 Puff(s) Inhalation every 6 hours PRN Shortness of Breath and/or Wheezing  baclofen 10 milliGRAM(s) Oral three times a day PRN Mild pain  dextrose Gel 1 Dose(s) Oral once PRN Blood Glucose LESS THAN 70 milliGRAM(s)/deciliter  diphenhydrAMINE   Injectable 12.5 milliGRAM(s) IV Push every 8 hours PRN itchiness  glucagon  Injectable 1 milliGRAM(s) IntraMuscular once PRN Glucose LESS THAN 70 milligrams/deciliter  meclizine 12.5 milliGRAM(s) Oral three times a day PRN Dizziness  morphine  - Injectable 2 milliGRAM(s) IV Push every 4 hours PRN Severe Pain (7 - 10)  ondansetron Injectable 4 milliGRAM(s) IV Push every 6 hours PRN Nausea and/or Vomiting  oxyCODONE    5 mG/acetaminophen 325 mG 1 Tablet(s) Oral every 4 hours PRN Mild Pain (1 - 3)  CAPILLARY BLOOD GLUCOSE  POCT Blood Glucose.: 178 mg/dL (23 Feb 2018 07:41)  POCT Blood Glucose.: 151 mg/dL (22 Feb 2018 21:09)  POCT Blood Glucose.: 201 mg/dL (22 Feb 2018 15:58)  POCT Blood Glucose.: 198 mg/dL (22 Feb 2018 10:42)    RECENT CULTURES:  Culture - Urine (02.14.18 @ 00:25)    -  Amikacin: S <=8    -  Ampicillin: R >16    -  Ampicillin/Sulbactam: R >16/8    -  Aztreonam: S <=4    -  Cefazolin: S 4    -  Cefepime: S <=2    -  Cefoxitin: S 8    -  Ceftazidime: S <=1    -  Ceftriaxone: S <=1    -  Ciprofloxacin: R >2    -  Ertapenem: S <=0.5    -  Gentamicin: S <=1    -  Imipenem: S <=1    -  Levofloxacin: R >4    -  Meropenem: S <=1    -  Nitrofurantoin: S <=32    -  Piperacillin/Tazobactam: S <=8    -  Tobramycin: S <=2    -  Trimethoprim/Sulfamethoxazole: R >2/38    Specimen Source: .Urine Clean Catch (Midstream)    Culture Results:   50,000 - 99,000 CFU/mL Escherichia coli    Organism Identification: Escherichia coli    Organism: Escherichia coli    Method Type: JUDIE    RADIOLOGY & ADDITIONAL TESTS:  Post-Op Dx:  Calculus of gallbladder with biliary obstruction but without cholecystitis  02/17/2018    Active  Fabian Ramos  Hydrops of gallbladder  02/17/2018    Active  Lakesha Lemus.  DVT/GI ppx  Discussed with pt @ bedside

## 2018-02-22 NOTE — DISCHARGE NOTE ADULT - NSTOBACCOHOTLINE_GEN_A_CS
[facility] Smokers Quitline ([phone]) [facility] Smokers Quitline ([phone])

## 2018-02-22 NOTE — DISCHARGE NOTE ADULT - PATIENT PORTAL LINK FT
You can access the [facility] Patient Portal, offered by [facility], by registering with the following website: [email or web address]

## 2018-02-22 NOTE — DISCHARGE NOTE ADULT - CARE PLAN
Principal Discharge DX:	Calculus of bile duct with acute cholecystitis without obstruction  Goal:	Improvement of symptoms  Assessment and plan of treatment:	1. Needs to follow strict diet.  Patient will have recurrence of symptoms with ingestion of fatty or fried foods.  2.  Activity as tolerated,.  Secondary Diagnosis:	Bipolar 1 disorder  Goal:	take all medications as prescribed  Assessment and plan of treatment:	1.  Follow up with PMD in 1-2 weeks  Secondary Diagnosis:	Depressed  Goal:	take all medications as prescribed  Assessment and plan of treatment:	Follow up with primary provider  Secondary Diagnosis:	Gastritis and duodenitis  Assessment and plan of treatment:	1.  Take all medications as prescribed  Secondary Diagnosis:	Mixed hyperlipidemia

## 2018-02-22 NOTE — DISCHARGE NOTE ADULT - MEDICATION SUMMARY - MEDICATIONS TO TAKE
I will START or STAY ON the medications listed below when I get home from the hospital:    Percocet 10/325 oral tablet  -- 1 tab(s) by mouth every 6 hours  -- Indication: For Pain    OXcarbazepine 300 mg oral tablet  -- 1 tab(s) by mouth 2 times a day  -- Indication: For Schizophrenia    gabapentin 600 mg oral tablet  -- 1 tab(s) by mouth 3 times a day  -- Indication: For Peripheral Neuropathy    KlonoPIN 2 mg oral tablet  -- 1 tab(s) by mouth once a day (at bedtime)  -- Indication: For Schizophrenia    traZODone 50 mg oral tablet  -- orally once a day (at bedtime)  -- Indication: For Insomnia    Starlix 120 mg oral tablet  -- 1 tab(s) by mouth 3 times a day (before meals)  -- Indication: For Diabetes    Janumet 50 mg-500 mg oral tablet  -- 1 tab(s) by mouth 2 times a day  -- Indication: For Diabetes    meclizine 12.5 mg oral tablet  -- 1 tab(s) by mouth 3 times a day, As Needed  -- Indication: For Dizziness    Lipitor 20 mg oral tablet  -- 1 tab(s) by mouth once a day  -- Indication: For hyperlipidemia    Spiriva Respimat 2.5 mcg/inh inhalation aerosol  -- 2 puff(s) inhaled once a day  -- Indication: For Chronic Obstructive Repiratory Disease    albuterol 90 mcg/inh inhalation aerosol  -- 2 puff(s) inhaled 4 times a day  -- Indication: For CoPD    fluocinolone 0.025% topical ointment  -- Apply on skin to affected area 2 times a day  -- Indication: For Rash    senna oral tablet  -- 2 tab(s) by mouth once a day (at bedtime)  -- Indication: For Constipation    docusate sodium 100 mg oral capsule  -- 1 cap(s) by mouth 3 times a day  -- Indication: For Constipation    Elmiron  -- 100 milligram(s) by mouth once a day  -- Indication: For Chronic Pelvic Pain    baclofen 10 mg oral tablet  -- 1 tab(s) by mouth 3 times a day, As Needed  -- Indication: For Muscle Spasm    pantoprazole 40 mg oral delayed release tablet  -- 1 tab(s) by mouth once a day (before a meal)  -- Indication: For Gastritis and duodenitis    nicotine 21 mg/24 hr transdermal film, extended release  -- 1 patch by transdermal patch once a day  -- Indication: For Tobacco abuse    Myrbetriq  -- Indication: For Chronic Pelvic Pain    B Complex 50 oral tablet, extended release  -- 1 tab(s) by mouth once a day  -- Indication: For Vitamin supplementation

## 2018-02-22 NOTE — DISCHARGE NOTE ADULT - PLAN OF CARE
Improvement of symptoms 1. Needs to follow strict diet.  Patient will have recurrence of symptoms with ingestion of fatty or fried foods.  2.  Activity as tolerated,. take all medications as prescribed 1.  Follow up with PMD in 1-2 weeks Follow up with primary provider 1.  Take all medications as prescribed

## 2018-02-22 NOTE — DISCHARGE NOTE ADULT - CARE PROVIDERS DIRECT ADDRESSES
,DirectAddress_Unknown,[email or web address]

## 2018-02-23 VITALS
TEMPERATURE: 98 F | HEART RATE: 88 BPM | RESPIRATION RATE: 16 BRPM | SYSTOLIC BLOOD PRESSURE: 162 MMHG | DIASTOLIC BLOOD PRESSURE: 92 MMHG | OXYGEN SATURATION: 96 %

## 2018-02-23 PROCEDURE — 99239 HOSP IP/OBS DSCHRG MGMT >30: CPT

## 2018-02-23 RX ADMIN — OXCARBAZEPINE 300 MILLIGRAM(S): 300 TABLET, FILM COATED ORAL at 05:31

## 2018-02-23 RX ADMIN — Medication 2: at 07:48

## 2018-02-23 RX ADMIN — PANTOPRAZOLE SODIUM 40 MILLIGRAM(S): 20 TABLET, DELAYED RELEASE ORAL at 07:48

## 2018-02-23 RX ADMIN — Medication 100 MILLIGRAM(S): at 05:31

## 2018-02-23 RX ADMIN — LISINOPRIL 5 MILLIGRAM(S): 2.5 TABLET ORAL at 05:31

## 2018-02-23 NOTE — PROGRESS NOTE ADULT - NSHPATTENDINGPLANDISCUSS_GEN_ALL_CORE
patient at bedside
patient at bedside, RN
patient at bedside, RN IDR staff,

## 2018-02-23 NOTE — PROGRESS NOTE ADULT - PROVIDER SPECIALTY LIST ADULT
Gastroenterology
Hospitalist
Surgery
Hospitalist

## 2018-02-23 NOTE — PROGRESS NOTE ADULT - ASSESSMENT
52 year old woman with PMH of asthma, HTN, HLD, panic d/o, DM2, bipolar 2, COPD, lung nodules presents complaining of RUQ pain. Found to have acute cholecystitis on this admission s/p Lap-choley POD #2    Problem/Plan - 1:  ·  Problem: Calculus of gallbladder without cholecystitis without obstruction.    Plan: Diet advanced to regular today. LFTs wnl. Morphine PRN for pain.  Zofran PRN for nausea/vomiting. HIDA scan positive yesterday for acute choly. S/P lap   choly.   Problem/Plan - 2:  ·  Problem: Type 2 diabetes mellitus with diabetic polyneuropathy, without long-term current use of insulin.  Plan: Sliding scale. Start basal doses once consistently tolerating PO diet.  BS today -187.     Problem/Plan - 3:  ·  Problem: Schizophrenia, unspecified type.  Plan: Continue home medications of Trileptal 300 mg bid, Klonipen 2 mg every PM and Trazadone 50 mg daily.     Problem/Plan - 4:  ·  Problem: Bipolar 2 disorder.     Problem/Plan - 5:  ·  Problem: Mild persistent asthma without complication.  Plan: Albuterol/Spiriva.     Problem/Plan - 6:  Problem: Mixed hyperlipidemia. Plan: Continue Lipitor 20 mg/day.    Problem/Plan - 7:  ·  Problem: Tobacco abuse.  Plan: Nicotine patch  Discussed cessation.
52 year old woman with PMH of asthma, HTN, HLD, panic d/o, DM2, bipolar 2, COPD, lung nodules presents complaining of RUQ pain.  Signs, symptoms, and work up consistent with symptomatic Cholelithiasis and possibly choledocholithiasis.      Problem/Plan - 1:  ·  Problem: Calculus of gallbladder without cholecystitis without obstruction.  Plan: NPO except meds for now.  Mgmt as per surgery/GI  Following results of MRCP  c/w D5 NS at 80 ml/hr for 24 hours  LFTs wnl  Serial abdominal examinations and observations.  Morphine PRN for pain  Zofran PRN for nausea/vomiting.     Problem/Plan - 2:  ·  Problem: Type 2 diabetes mellitus with diabetic polyneuropathy, without long-term current use of insulin.  Plan: Sliding scale only while NPO  Start basal doses as clinically appropriate.     Problem/Plan - 3:  ·  Problem: Schizophrenia, unspecified type.  Plan: Continue home medications.     Problem/Plan - 4:  ·  Problem: Bipolar 2 disorder.  Plan: Continue home medications.     Problem/Plan - 5:  ·  Problem: Mild persistent asthma without complication.  Plan: Albuterol/Spiriva.     Problem/Plan - 6:  Problem: Mixed hyperlipidemia. Plan: Continue Lipitor.    Problem/Plan - 7:  ·  Problem: Tobacco abuse.  Plan: Nicotine patch  Discussed cessation.     Problem/Plan - 8:  ·  Problem: Non morbid obesity due to excess calories.  Plan: Diet and exercise as tolerated  Nutritionist consult.     Problem/Plan - 9:  ·  Problem: Need for hepatitis C screening test.  Plan: Will send Hep C ab as patient 52 years old.     Problem/Plan - 10:  Problem: Preventive measure. Plan; Early ambulation for DVT prophylaxis  General precautions.
52 year old woman with PMH of asthma, HTN, HLD, panic d/o, DM2, bipolar 2, COPD, lung nodules presents complaining of RUQ pain.  Signs, symptoms, and work up consistent with symptomatic cholelithiasis and possibly choledocholithiasis.  Patient will require admission for at least 2 midnights as detailed below.     IMPROVE VTE Individual Risk Assessment          RISK                                                          Points    [  ] Previous VTE                                                3    [  ] Thrombophilia                                             2    [  ] Lower limb paralysis                                    2        (unable to hold up >15 seconds)      [  ] Current Cancer                                             2         (within 6 months)    [  ] Immobilization > 24 hrs                              1    [  ] ICU/CCU stay > 24 hours                            1    [  ] Age > 60                                                    1    IMPROVE VTE Score ______0___
52 year old woman with PMH of asthma, HTN, HLD, panic d/o, DM2, bipolar 2, COPD, lung nodules presents complaining of RUQ pain.  Signs, symptoms, and work up consistent with symptomatic cholelithiasis and possibly choledocholithiasis.  Patient will require admission for at least 2 midnights as detailed below.     IMPROVE VTE Individual Risk Assessment          RISK                                                          Points    [  ] Previous VTE                                                3    [  ] Thrombophilia                                             2    [  ] Lower limb paralysis                                    2        (unable to hold up >15 seconds)      [  ] Current Cancer                                             2         (within 6 months)    [  ] Immobilization > 24 hrs                              1    [  ] ICU/CCU stay > 24 hours                            1    [  ] Age > 60                                                    1    IMPROVE VTE Score ______0___
52 year old woman with PMH of asthma, HTN, HLD, panic d/o, DM2, bipolar 2, COPD, lung nodules presents complaining of RUQ pain. s/p lap/choley POD#4     Problem/Plan - 1:  ·  Problem: Ileus following gastrointestinal surgery.    Plan: - Patient was NPO overnight, will advance diet to clears no  -  attempt to wean of opioid medications  - will correct electrolyte imbalance  - will follow am electrolyte panel including mg, phos, etc.   - c/w cathartics, and stool softeners   - suspect element of anxiety driving persistent request for opioids    Problem/Plan - 2:  ·  Problem: Gastritis and duodenitis.    Plan: c/w carafate and protonix.     Problem/Plan - 3:  ·  Problem: Symptomatic cholelithiasis.   paolo: s/p lap/choly POD 34  surgery reccs appreciated.     Problem/Plan - 4:  ·  Problem: Type 2 diabetes mellitus with diabetic polyneuropathy, without long-term current use of insulin.    Plan: continue with HISS  accucheck Qac and HS.     Problem/Plan - 5:  ·  Problem: Bipolar 1 disorder.    Plan: continue home medications.     Problem/Plan - 6:  Problem: Hypokalemia  Plan: resolved.  d/c potassium chloride 20 meq in IVF
52 year old woman with PMH of asthma, HTN, HLD, panic d/o, DM2, bipolar 2, COPD, lung nodules presents complaining of RUQ pain. s/p lap/choley POD#4     Problem/Plan - 1:  ·  Problem: Ileus following gastrointestinal surgery.    Plan: - resolved.  Eating consistent carbohydrate low fat diet  - will correct electrolyte imbalance  - will follow am electrolyte panel including mg, phos, etc.   - c/w cathartics, and stool softeners   - suspect element of anxiety driving persistent request for opioids    Problem/Plan - 2:  ·  Problem: Gastritis and duodenitis.    Plan: c/w carafate and protonix.     Problem/Plan - 3:  ·  Problem: Symptomatic cholelithiasis.   paolo: s/p lap/choly POD 34  surgery reccs appreciated.     Problem/Plan - 4:  ·  Problem: Type 2 diabetes mellitus with diabetic polyneuropathy, without long-term current use of insulin.    Plan: continue with HISS  accucheck Qac and HS.     Problem/Plan - 5:  ·  Problem: Bipolar 1 disorder.    Plan: continue home medications.     Problem/Plan - 6:  Problem: Hypokalemia  Plan: resolved.  d/c potassium chloride 20 meq in IVF
Assessment:  52-year-old woman admitted with chronic right sided abdominal pain.  Workup revealed gastritis, a chronic duodenal ulcer, and a chronically obstructed gallbladder secondary to cholelithiasis.  She is now POD 2 s/p laparoscopic cholecystectomy.      Plan:  - Considering that she continues to report nausea and is not taking much other than clears, will change diet to clear liquids and resume IV fluids.  - Continue PPI/Carafate for gastritis and the duodenal ulcer per GI recommendations.   - OOB/Ambulate
Assessment: 52-year-old woman with chronic intermittent right-sided abdominal pain in the setting of cholelithiasis and a dilated CBD.    Plan:  - I reiterated the need and rational behind having an ERCP to workup her CBD dilation prior to undergoing a cholecystectomy.  She understands and is requesting that she have the ERCP as an inpatient.  This was d/w Dr. Floyd's team and they will plan for an endoscopy tonight.  - Further recommendations will be made based upon the endoscopy findings.
Assessment: 52-year-old woman with chronic intermittent right-sided abdominal pain in the setting of cholelithiasis resulting in a chronic cystic duct obstruction, a dilated CBD, a duodenal ulcer, and gastritis.    Plan:  - For laparoscopic cholecystectomy this AM.  Medical clearance appreciated.  Consent obtained yesterday.  - Will likely need to discuss post-operative pain management regimen with the patient's chronic pain physician.
Dilated CBD with normal LFTs, Symptomatic Cholelithiasis
Dilated CBD with normal LFTs, Symptomatic Cholelithiasis  S/P EGD , Posterior bulbar ulcer, Diffuse streaky Gastritis
Dilated CBD with normal LFTs, Symptomatic Cholelithiasis s/p lap renata 2/17  S/P EGD , Posterior bulbar ulcer, Diffuse streaky Gastritis 2/16
S/P EGD , Posterior bulbar ulcer, Diffuse streaky Gastritis
52 year old woman with PMH of asthma, HTN, HLD, panic d/o, DM2, bipolar 2, COPD, lung nodules presents complaining of RUQ pain.  Signs, symptoms, and work up consistent with symptomatic cholelithiasis and possibly choledocholithiasis.  Patient will require admission for at least 2 midnights as detailed below.     IMPROVE VTE Individual Risk Assessment          RISK                                                          Points    [  ] Previous VTE                                                3    [  ] Thrombophilia                                             2    [  ] Lower limb paralysis                                    2        (unable to hold up >15 seconds)      [  ] Current Cancer                                             2         (within 6 months)    [  ] Immobilization > 24 hrs                              1    [  ] ICU/CCU stay > 24 hours                            1    [  ] Age > 60                                                    1    IMPROVE VTE Score ______0___
52 year old woman with PMH of asthma, HTN, HLD, panic d/o, DM2, bipolar 2, COPD, lung nodules presents complaining of RUQ pain. s/p lap/choley POD#3 now with abdominal distention and bloating
52 year old woman with PMH of asthma, HTN, HLD, panic d/o, DM2, bipolar 2, COPD, lung nodules presents complaining of RUQ pain.  Signs, symptoms, and work up consistent with symptomatic Cholelithiasis and possibly choledocholithiasis.  Patient will require admission for at least 2 midnights as detailed below:    IMPROVE VTE Individual Risk Assessment          RISK                                                          Points    [  ] Previous VTE                                                3    [  ] Thrombophilia                                             2    [  ] Lower limb paralysis                                    2        (unable to hold up >15 seconds)      [  ] Current Cancer                                             2         (within 6 months)    [  ] Immobilization > 24 hrs                              1    [  ] ICU/CCU stay > 24 hours                            1    [  ] Age > 60                                                    1    IMPROVE VTE Score ______0___

## 2018-02-23 NOTE — PROGRESS NOTE ADULT - SUBJECTIVE AND OBJECTIVE BOX
Patient is a 52y old  Female who presents with a chief complaint of RUQ pain (22 Feb 2018 12:03)        HPI:  52 year old woman with PMH of asthma, HTN, HLD, panic d/o, DM2, bipolar 2, COPD, lung nodules presents complaining of RUQ pain.  She states that for the last month she has had RUQ pain since a CT scan as outpt found gallstone.  The pain has become increasingly worse so her PMD sent her to the hospital.  She denies fever, chills, nausea, vomiting, diarrhea.    In the ED, CT and US ab show Cholelithiasis as well as CBD dilation.  ED discussed with surgeon who advised admission to medicine for further evaluation. (13 Feb 2018 23:30)      SUBJECTIVE & OBJECTIVE: Pt seen and examined at bedside. Doing well at time of discharge    PHYSICAL EXAM:  T(C): 36.6 (02-23-18 @ 05:28), Max: 36.6 (02-22-18 @ 23:36)  HR: 88 (02-23-18 @ 05:28) (80 - 88)  BP: 162/92 (02-23-18 @ 05:28) (157/91 - 162/92)  RR: 16 (02-23-18 @ 05:28) (16 - 18)  SpO2: 96% (02-23-18 @ 05:28) (95% - 96%)  Wt(kg): --   I&O's Detail    GENERAL: NAD, well-groomed, well-developed  HEAD:  Atraumatic, Normocephalic  EYES: EOMI, PERRLA, conjunctiva and sclera clear  ENMT: Moist mucous membranes  NECK: Supple, No JVD  NERVOUS SYSTEM:  Alert & Oriented X3, Motor Strength 5/5 B/L upper and lower extremities; DTRs 2+ intact and symmetric  CHEST/LUNG: Clear to auscultation bilaterally; No rales, rhonchi, wheezing, or rubs  HEART: Regular rate and rhythm; No murmurs, rubs, or gallops  ABDOMEN: Soft, Nontender, Nondistended; Bowel sounds present  EXTREMITIES:  2+ Peripheral Pulses, No clubbing, cyanosis, or edema    MEDICATIONS  (STANDING):  atorvastatin 20 milliGRAM(s) Oral at bedtime  clonazePAM Tablet 2 milliGRAM(s) Oral at bedtime  dextrose 5%. 1000 milliLiter(s) (50 mL/Hr) IV Continuous <Continuous>  dextrose 50% Injectable 12.5 Gram(s) IV Push once  dextrose 50% Injectable 25 Gram(s) IV Push once  dextrose 50% Injectable 25 Gram(s) IV Push once  docusate sodium 100 milliGRAM(s) Oral three times a day  heparin  Injectable 5000 Unit(s) SubCutaneous every 12 hours  insulin lispro (HumaLOG) corrective regimen sliding scale   SubCutaneous three times a day before meals  insulin lispro (HumaLOG) corrective regimen sliding scale   SubCutaneous at bedtime  lisinopril 5 milliGRAM(s) Oral daily  nicotine - 21 mG/24Hr(s) Patch 1 patch Transdermal daily  OXcarbazepine 300 milliGRAM(s) Oral two times a day  pantoprazole    Tablet 40 milliGRAM(s) Oral before breakfast  senna 2 Tablet(s) Oral at bedtime  sodium biphosphate Rectal Enema 1 Enema Rectal once  tiotropium 18 MICROgram(s) Capsule 1 Capsule(s) Inhalation daily  traZODone 50 milliGRAM(s) Oral at bedtime    MEDICATIONS  (PRN):  ALBUTerol    90 MICROgram(s) HFA Inhaler 2 Puff(s) Inhalation every 6 hours PRN Shortness of Breath and/or Wheezing  baclofen 10 milliGRAM(s) Oral three times a day PRN Mild pain  dextrose Gel 1 Dose(s) Oral once PRN Blood Glucose LESS THAN 70 milliGRAM(s)/deciliter  diphenhydrAMINE   Injectable 12.5 milliGRAM(s) IV Push every 8 hours PRN itchiness  glucagon  Injectable 1 milliGRAM(s) IntraMuscular once PRN Glucose LESS THAN 70 milligrams/deciliter  meclizine 12.5 milliGRAM(s) Oral three times a day PRN Dizziness  morphine  - Injectable 2 milliGRAM(s) IV Push every 4 hours PRN Severe Pain (7 - 10)  ondansetron Injectable 4 milliGRAM(s) IV Push every 6 hours PRN Nausea and/or Vomiting  oxyCODONE    5 mG/acetaminophen 325 mG 1 Tablet(s) Oral every 4 hours PRN Mild Pain (1 - 3)    POCT Blood Glucose.: 178 mg/dL (23 Feb 2018 07:41)  POCT Blood Glucose.: 151 mg/dL (22 Feb 2018 21:09)    DVT/GI ppx  Discussed with pt @ bedside

## 2018-02-26 PROBLEM — F32.9 MAJOR DEPRESSIVE DISORDER, SINGLE EPISODE, UNSPECIFIED: Chronic | Status: ACTIVE | Noted: 2018-02-13

## 2018-02-26 PROBLEM — E11.9 TYPE 2 DIABETES MELLITUS WITHOUT COMPLICATIONS: Chronic | Status: ACTIVE | Noted: 2018-02-13

## 2018-02-26 PROBLEM — F20.9 SCHIZOPHRENIA, UNSPECIFIED: Chronic | Status: ACTIVE | Noted: 2018-02-13

## 2018-02-26 PROBLEM — F14.90 COCAINE USE, UNSPECIFIED, UNCOMPLICATED: Chronic | Status: ACTIVE | Noted: 2018-02-13

## 2018-02-26 PROBLEM — F31.9 BIPOLAR DISORDER, UNSPECIFIED: Chronic | Status: ACTIVE | Noted: 2018-02-13

## 2018-02-26 PROBLEM — F10.10 ALCOHOL ABUSE, UNCOMPLICATED: Chronic | Status: ACTIVE | Noted: 2018-02-13

## 2018-02-27 DIAGNOSIS — F32.9 MAJOR DEPRESSIVE DISORDER, SINGLE EPISODE, UNSPECIFIED: ICD-10-CM

## 2018-02-27 DIAGNOSIS — K29.80 DUODENITIS WITHOUT BLEEDING: ICD-10-CM

## 2018-02-27 DIAGNOSIS — K56.7 ILEUS, UNSPECIFIED: ICD-10-CM

## 2018-02-27 DIAGNOSIS — K80.12 CALCULUS OF GALLBLADDER WITH ACUTE AND CHRONIC CHOLECYSTITIS WITHOUT OBSTRUCTION: ICD-10-CM

## 2018-02-27 DIAGNOSIS — K82.1 HYDROPS OF GALLBLADDER: ICD-10-CM

## 2018-02-27 DIAGNOSIS — E11.42 TYPE 2 DIABETES MELLITUS WITH DIABETIC POLYNEUROPATHY: ICD-10-CM

## 2018-02-27 DIAGNOSIS — K83.8 OTHER SPECIFIED DISEASES OF BILIARY TRACT: ICD-10-CM

## 2018-02-27 DIAGNOSIS — E87.6 HYPOKALEMIA: ICD-10-CM

## 2018-02-27 DIAGNOSIS — J45.909 UNSPECIFIED ASTHMA, UNCOMPLICATED: ICD-10-CM

## 2018-02-27 DIAGNOSIS — K26.7 CHRONIC DUODENAL ULCER WITHOUT HEMORRHAGE OR PERFORATION: ICD-10-CM

## 2018-02-27 DIAGNOSIS — J44.9 CHRONIC OBSTRUCTIVE PULMONARY DISEASE, UNSPECIFIED: ICD-10-CM

## 2018-02-27 DIAGNOSIS — Z91.11 PATIENT'S NONCOMPLIANCE WITH DIETARY REGIMEN: ICD-10-CM

## 2018-02-27 DIAGNOSIS — F20.9 SCHIZOPHRENIA, UNSPECIFIED: ICD-10-CM

## 2018-02-27 DIAGNOSIS — R16.0 HEPATOMEGALY, NOT ELSEWHERE CLASSIFIED: ICD-10-CM

## 2018-02-27 DIAGNOSIS — Z79.84 LONG TERM (CURRENT) USE OF ORAL HYPOGLYCEMIC DRUGS: ICD-10-CM

## 2018-02-27 DIAGNOSIS — E78.2 MIXED HYPERLIPIDEMIA: ICD-10-CM

## 2018-02-27 DIAGNOSIS — E66.09 OTHER OBESITY DUE TO EXCESS CALORIES: ICD-10-CM

## 2018-02-27 DIAGNOSIS — R91.1 SOLITARY PULMONARY NODULE: ICD-10-CM

## 2018-02-27 DIAGNOSIS — K25.9 GASTRIC ULCER, UNSPECIFIED AS ACUTE OR CHRONIC, WITHOUT HEMORRHAGE OR PERFORATION: ICD-10-CM

## 2018-02-27 DIAGNOSIS — K29.50 UNSPECIFIED CHRONIC GASTRITIS WITHOUT BLEEDING: ICD-10-CM

## 2018-03-06 ENCOUNTER — EMERGENCY (EMERGENCY)
Facility: HOSPITAL | Age: 53
LOS: 0 days | Discharge: HOME | End: 2018-03-06
Attending: EMERGENCY MEDICINE
Payer: MEDICARE

## 2018-03-06 VITALS
SYSTOLIC BLOOD PRESSURE: 128 MMHG | OXYGEN SATURATION: 95 % | TEMPERATURE: 99 F | HEART RATE: 91 BPM | DIASTOLIC BLOOD PRESSURE: 68 MMHG | WEIGHT: 166.89 LBS | HEIGHT: 63 IN | RESPIRATION RATE: 18 BRPM

## 2018-03-06 DIAGNOSIS — R10.11 RIGHT UPPER QUADRANT PAIN: ICD-10-CM

## 2018-03-06 DIAGNOSIS — K26.7 CHRONIC DUODENAL ULCER WITHOUT HEMORRHAGE OR PERFORATION: ICD-10-CM

## 2018-03-06 DIAGNOSIS — F41.0 PANIC DISORDER [EPISODIC PAROXYSMAL ANXIETY]: ICD-10-CM

## 2018-03-06 DIAGNOSIS — I10 ESSENTIAL (PRIMARY) HYPERTENSION: ICD-10-CM

## 2018-03-06 DIAGNOSIS — E11.9 TYPE 2 DIABETES MELLITUS WITHOUT COMPLICATIONS: ICD-10-CM

## 2018-03-06 DIAGNOSIS — J45.909 UNSPECIFIED ASTHMA, UNCOMPLICATED: ICD-10-CM

## 2018-03-06 DIAGNOSIS — F31.81 BIPOLAR II DISORDER: ICD-10-CM

## 2018-03-06 LAB
ALBUMIN SERPL ELPH-MCNC: 3.3 G/DL — SIGNIFICANT CHANGE UP (ref 3.3–5)
ALP SERPL-CCNC: 107 U/L — SIGNIFICANT CHANGE UP (ref 40–120)
ALT FLD-CCNC: 25 U/L — SIGNIFICANT CHANGE UP (ref 12–78)
ANION GAP SERPL CALC-SCNC: 7 MMOL/L — SIGNIFICANT CHANGE UP (ref 5–17)
APTT BLD: 27.4 SEC — LOW (ref 27.5–37.4)
AST SERPL-CCNC: 14 U/L — LOW (ref 15–37)
BILIRUB SERPL-MCNC: 0.3 MG/DL — SIGNIFICANT CHANGE UP (ref 0.2–1.2)
BUN SERPL-MCNC: 12 MG/DL — SIGNIFICANT CHANGE UP (ref 7–23)
CALCIUM SERPL-MCNC: 8.3 MG/DL — LOW (ref 8.5–10.1)
CHLORIDE SERPL-SCNC: 103 MMOL/L — SIGNIFICANT CHANGE UP (ref 96–108)
CO2 SERPL-SCNC: 28 MMOL/L — SIGNIFICANT CHANGE UP (ref 22–31)
CREAT SERPL-MCNC: 0.55 MG/DL — SIGNIFICANT CHANGE UP (ref 0.5–1.3)
GLUCOSE SERPL-MCNC: 190 MG/DL — HIGH (ref 70–99)
HCG SERPL-ACNC: <1 MIU/ML — SIGNIFICANT CHANGE UP
HCT VFR BLD CALC: 36.2 % — SIGNIFICANT CHANGE UP (ref 34.5–45)
HGB BLD-MCNC: 12.3 G/DL — SIGNIFICANT CHANGE UP (ref 11.5–15.5)
INR BLD: 0.94 RATIO — SIGNIFICANT CHANGE UP (ref 0.88–1.16)
LIDOCAIN IGE QN: 115 U/L — SIGNIFICANT CHANGE UP (ref 73–393)
MCHC RBC-ENTMCNC: 29.1 PG — SIGNIFICANT CHANGE UP (ref 27–34)
MCHC RBC-ENTMCNC: 34 GM/DL — SIGNIFICANT CHANGE UP (ref 32–36)
MCV RBC AUTO: 85.6 FL — SIGNIFICANT CHANGE UP (ref 80–100)
NRBC # BLD: 0 /100 WBCS — SIGNIFICANT CHANGE UP (ref 0–0)
PLATELET # BLD AUTO: 367 K/UL — SIGNIFICANT CHANGE UP (ref 150–400)
POTASSIUM SERPL-MCNC: 4.4 MMOL/L — SIGNIFICANT CHANGE UP (ref 3.5–5.3)
POTASSIUM SERPL-SCNC: 4.4 MMOL/L — SIGNIFICANT CHANGE UP (ref 3.5–5.3)
PROT SERPL-MCNC: 6.4 GM/DL — SIGNIFICANT CHANGE UP (ref 6–8.3)
PROTHROM AB SERPL-ACNC: 10.2 SEC — SIGNIFICANT CHANGE UP (ref 9.8–12.7)
RBC # BLD: 4.23 M/UL — SIGNIFICANT CHANGE UP (ref 3.8–5.2)
RBC # FLD: 13.3 % — SIGNIFICANT CHANGE UP (ref 10.3–14.5)
SODIUM SERPL-SCNC: 138 MMOL/L — SIGNIFICANT CHANGE UP (ref 135–145)
TROPONIN I SERPL-MCNC: <.015 NG/ML — SIGNIFICANT CHANGE UP (ref 0.01–0.04)
WBC # BLD: 8.99 K/UL — SIGNIFICANT CHANGE UP (ref 3.8–10.5)
WBC # FLD AUTO: 8.99 K/UL — SIGNIFICANT CHANGE UP (ref 3.8–10.5)

## 2018-03-06 PROCEDURE — 74177 CT ABD & PELVIS W/CONTRAST: CPT | Mod: 26

## 2018-03-06 PROCEDURE — 71045 X-RAY EXAM CHEST 1 VIEW: CPT | Mod: 26

## 2018-03-06 PROCEDURE — 99284 EMERGENCY DEPT VISIT MOD MDM: CPT

## 2018-03-06 RX ORDER — IOHEXOL 300 MG/ML
30 INJECTION, SOLUTION INTRAVENOUS ONCE
Qty: 0 | Refills: 0 | Status: COMPLETED | OUTPATIENT
Start: 2018-03-06 | End: 2018-03-06

## 2018-03-06 RX ORDER — MORPHINE SULFATE 50 MG/1
4 CAPSULE, EXTENDED RELEASE ORAL ONCE
Qty: 0 | Refills: 0 | Status: DISCONTINUED | OUTPATIENT
Start: 2018-03-06 | End: 2018-03-06

## 2018-03-06 RX ORDER — FAMOTIDINE 10 MG/ML
40 INJECTION INTRAVENOUS ONCE
Qty: 0 | Refills: 0 | Status: COMPLETED | OUTPATIENT
Start: 2018-03-06 | End: 2018-03-06

## 2018-03-06 RX ORDER — IOHEXOL 300 MG/ML
30 INJECTION, SOLUTION INTRAVENOUS ONCE
Qty: 0 | Refills: 0 | Status: DISCONTINUED | OUTPATIENT
Start: 2018-03-06 | End: 2018-03-06

## 2018-03-06 RX ADMIN — MORPHINE SULFATE 4 MILLIGRAM(S): 50 CAPSULE, EXTENDED RELEASE ORAL at 16:34

## 2018-03-06 RX ADMIN — MORPHINE SULFATE 4 MILLIGRAM(S): 50 CAPSULE, EXTENDED RELEASE ORAL at 16:02

## 2018-03-06 RX ADMIN — FAMOTIDINE 40 MILLIGRAM(S): 10 INJECTION INTRAVENOUS at 16:57

## 2018-03-06 NOTE — ED PROVIDER NOTE - PHYSICAL EXAMINATION
Vitals: WNL  Gen: AAOx3, NAD, sitting comfortably in stretcher  Head: ncat, perrla, eomi b/l  Neck: supple, no lymphadenopathy, no midline deviation  Heart: rrr, no m/r/g  Lungs: CTA b/l, no rales/ronchi/wheezes  Abd: soft, RUQ tenderness, multiple healing surgical incisions, no erythema or discharge from healing wounds, non-distended, no rebound or guarding  Ext: no clubbing/cyanosis/edema  Neuro: sensation and muscle strength intact b/l, steady gait

## 2018-03-06 NOTE — ED PROVIDER NOTE - PROGRESS NOTE DETAILS
spoke with Dr. Ramos, recommends labs and scan to make sure all is ok  pt. was complaining of pain while in house before, similar type of pain, followed by Floyd for duodenal ulcers  will f/u results and d/c if normal tests Results reported to patient--grossly benign, lesion on kidney; pt. will f/u with pcp for that  Pt. reports feeling better after meds  pt. agrees to f/u with primary care outpt. and GI and Dr. Ramos  pt. understands to return to ED if symptoms worsen; will d/c

## 2018-03-06 NOTE — ED PROVIDER NOTE - OBJECTIVE STATEMENT
51 yo F, s/p renata 3 weeks ago, now with RUQ pain started today.  She lifted up a box of children's juice and started to have sharp RUQ pain that comes and goes since this morning.  She called Dr. Ramos, who recommended coming to ER for blood work and CT.  Pt. has no other complaints.   ROS: negative for fever, cough, headache, chest pain, shortness of breath, nausea, vomiting, diarrhea, rash, paresthesia, and weakness.   PMH:  asthma, HTN, HLD, panic d/o, DM2, bipolar 2, COPD, lung nodules; Meds: See EMR; SH: Denies smoking/drinking/drug use 53 yo F, s/p renata 3 weeks ago, now with RUQ pain started today.  She lifted up a box of children's juice and started to have sharp RUQ pain that comes and goes since this morning.  She called Dr. Ramos, who recommended coming to ER for blood work and CT.  Pt. has no other complaints.   ROS: negative for fever, cough, headache, chest pain, shortness of breath, nausea, vomiting, diarrhea, rash, paresthesia, and weakness.   PMH:  asthma, HTN, HLD, panic d/o, DM2, bipolar 2, COPD, lung nodules, chronic duodenal ulcer--Dr. Floyd was following; Meds: See EMR; SH: Denies smoking/drinking/drug use

## 2018-03-06 NOTE — ED ADULT TRIAGE NOTE - CHIEF COMPLAINT QUOTE
Patient complains of abdominal pain in the upper right side. Had gallbladder removed 3 weeks ago. Patient reports calling surgeon and surgeon told her to come into ED. Denies N/V /D

## 2018-03-06 NOTE — ED ADULT NURSE NOTE - OBJECTIVE STATEMENT
Patient alert stating Feb 13th she had a gall bladder removal. States she was discharged from the hospital on Feb 23rd. States she has a history of DM and Asthma. Complaining of pain in the left side where her surgery was done. Denies nausea, vomiting, diarrhea, or feeling hot.

## 2018-03-06 NOTE — ED PROVIDER NOTE - MEDICAL DECISION MAKING DETAILS
51 yo F with RUQ abd pain post-op 3 wks renata  -basic labs, trop, lipase, hcg, ct abd/pel ++ contrast, ekg, cxr

## 2018-03-16 ENCOUNTER — APPOINTMENT (OUTPATIENT)
Dept: SURGERY | Facility: CLINIC | Age: 53
End: 2018-03-16
Payer: MEDICARE

## 2018-03-16 VITALS
SYSTOLIC BLOOD PRESSURE: 150 MMHG | BODY MASS INDEX: 32.96 KG/M2 | DIASTOLIC BLOOD PRESSURE: 99 MMHG | HEIGHT: 63 IN | WEIGHT: 186 LBS | HEART RATE: 111 BPM

## 2018-03-16 DIAGNOSIS — K29.70 GASTRITIS, UNSPECIFIED, W/OUT BLEEDING: ICD-10-CM

## 2018-03-16 DIAGNOSIS — Z90.49 ACQUIRED ABSENCE OF OTHER SPECIFIED PARTS OF DIGESTIVE TRACT: ICD-10-CM

## 2018-03-16 DIAGNOSIS — K26.7 CHRONIC DUODENAL ULCER W/OUT HEMORRHAGE OR PERFORATION: ICD-10-CM

## 2018-03-16 PROCEDURE — 99024 POSTOP FOLLOW-UP VISIT: CPT

## 2018-03-17 ENCOUNTER — EMERGENCY (EMERGENCY)
Facility: HOSPITAL | Age: 53
LOS: 0 days | Discharge: HOME | End: 2018-03-18
Attending: EMERGENCY MEDICINE
Payer: MEDICARE

## 2018-03-17 VITALS
WEIGHT: 167.99 LBS | RESPIRATION RATE: 19 BRPM | SYSTOLIC BLOOD PRESSURE: 135 MMHG | HEART RATE: 99 BPM | DIASTOLIC BLOOD PRESSURE: 81 MMHG | HEIGHT: 63 IN | OXYGEN SATURATION: 98 % | TEMPERATURE: 98 F

## 2018-03-17 DIAGNOSIS — F20.9 SCHIZOPHRENIA, UNSPECIFIED: ICD-10-CM

## 2018-03-17 DIAGNOSIS — E11.9 TYPE 2 DIABETES MELLITUS WITHOUT COMPLICATIONS: ICD-10-CM

## 2018-03-17 DIAGNOSIS — F14.10 COCAINE ABUSE, UNCOMPLICATED: ICD-10-CM

## 2018-03-17 DIAGNOSIS — R10.9 UNSPECIFIED ABDOMINAL PAIN: ICD-10-CM

## 2018-03-17 DIAGNOSIS — K27.9 PEPTIC ULCER, SITE UNSPECIFIED, UNSPECIFIED AS ACUTE OR CHRONIC, WITHOUT HEMORRHAGE OR PERFORATION: ICD-10-CM

## 2018-03-17 DIAGNOSIS — F31.9 BIPOLAR DISORDER, UNSPECIFIED: ICD-10-CM

## 2018-03-17 DIAGNOSIS — F10.10 ALCOHOL ABUSE, UNCOMPLICATED: ICD-10-CM

## 2018-03-17 DIAGNOSIS — K21.9 GASTRO-ESOPHAGEAL REFLUX DISEASE WITHOUT ESOPHAGITIS: ICD-10-CM

## 2018-03-17 DIAGNOSIS — F32.9 MAJOR DEPRESSIVE DISORDER, SINGLE EPISODE, UNSPECIFIED: ICD-10-CM

## 2018-03-17 PROCEDURE — 99284 EMERGENCY DEPT VISIT MOD MDM: CPT

## 2018-03-17 RX ORDER — SODIUM CHLORIDE 9 MG/ML
1000 INJECTION INTRAMUSCULAR; INTRAVENOUS; SUBCUTANEOUS ONCE
Qty: 0 | Refills: 0 | Status: COMPLETED | OUTPATIENT
Start: 2018-03-17 | End: 2018-03-17

## 2018-03-17 RX ORDER — SODIUM CHLORIDE 9 MG/ML
3 INJECTION INTRAMUSCULAR; INTRAVENOUS; SUBCUTANEOUS ONCE
Qty: 0 | Refills: 0 | Status: COMPLETED | OUTPATIENT
Start: 2018-03-17 | End: 2018-03-17

## 2018-03-17 RX ORDER — LIDOCAINE 4 G/100G
10 CREAM TOPICAL ONCE
Qty: 0 | Refills: 0 | Status: COMPLETED | OUTPATIENT
Start: 2018-03-17 | End: 2018-03-17

## 2018-03-17 RX ORDER — PANTOPRAZOLE SODIUM 20 MG/1
40 TABLET, DELAYED RELEASE ORAL ONCE
Qty: 0 | Refills: 0 | Status: COMPLETED | OUTPATIENT
Start: 2018-03-17 | End: 2018-03-17

## 2018-03-17 RX ORDER — ONDANSETRON 8 MG/1
4 TABLET, FILM COATED ORAL ONCE
Qty: 0 | Refills: 0 | Status: COMPLETED | OUTPATIENT
Start: 2018-03-17 | End: 2018-03-18

## 2018-03-18 VITALS
HEART RATE: 93 BPM | RESPIRATION RATE: 18 BRPM | SYSTOLIC BLOOD PRESSURE: 156 MMHG | OXYGEN SATURATION: 100 % | DIASTOLIC BLOOD PRESSURE: 93 MMHG | TEMPERATURE: 98 F

## 2018-03-18 LAB
ALBUMIN SERPL ELPH-MCNC: 3.4 G/DL — SIGNIFICANT CHANGE UP (ref 3.3–5)
ALP SERPL-CCNC: 97 U/L — SIGNIFICANT CHANGE UP (ref 40–120)
ALT FLD-CCNC: 16 U/L — SIGNIFICANT CHANGE UP (ref 12–78)
ANION GAP SERPL CALC-SCNC: 6 MMOL/L — SIGNIFICANT CHANGE UP (ref 5–17)
AST SERPL-CCNC: 9 U/L — LOW (ref 15–37)
BASOPHILS # BLD AUTO: 0.05 K/UL — SIGNIFICANT CHANGE UP (ref 0–0.2)
BASOPHILS NFR BLD AUTO: 0.6 % — SIGNIFICANT CHANGE UP (ref 0–2)
BILIRUB SERPL-MCNC: 0.3 MG/DL — SIGNIFICANT CHANGE UP (ref 0.2–1.2)
BUN SERPL-MCNC: 9 MG/DL — SIGNIFICANT CHANGE UP (ref 7–23)
CALCIUM SERPL-MCNC: 8.8 MG/DL — SIGNIFICANT CHANGE UP (ref 8.5–10.1)
CHLORIDE SERPL-SCNC: 104 MMOL/L — SIGNIFICANT CHANGE UP (ref 96–108)
CO2 SERPL-SCNC: 29 MMOL/L — SIGNIFICANT CHANGE UP (ref 22–31)
CREAT SERPL-MCNC: 0.47 MG/DL — LOW (ref 0.5–1.3)
EOSINOPHIL # BLD AUTO: 0.24 K/UL — SIGNIFICANT CHANGE UP (ref 0–0.5)
EOSINOPHIL NFR BLD AUTO: 2.8 % — SIGNIFICANT CHANGE UP (ref 0–6)
GLUCOSE SERPL-MCNC: 142 MG/DL — HIGH (ref 70–99)
HCT VFR BLD CALC: 39.2 % — SIGNIFICANT CHANGE UP (ref 34.5–45)
HGB BLD-MCNC: 13.6 G/DL — SIGNIFICANT CHANGE UP (ref 11.5–15.5)
IMM GRANULOCYTES NFR BLD AUTO: 0.2 % — SIGNIFICANT CHANGE UP (ref 0–1.5)
LACTATE SERPL-SCNC: 1.2 MMOL/L — SIGNIFICANT CHANGE UP (ref 0.7–2)
LIDOCAIN IGE QN: 107 U/L — SIGNIFICANT CHANGE UP (ref 73–393)
LYMPHOCYTES # BLD AUTO: 4.43 K/UL — HIGH (ref 1–3.3)
LYMPHOCYTES # BLD AUTO: 51.5 % — HIGH (ref 13–44)
MCHC RBC-ENTMCNC: 29.8 PG — SIGNIFICANT CHANGE UP (ref 27–34)
MCHC RBC-ENTMCNC: 34.7 GM/DL — SIGNIFICANT CHANGE UP (ref 32–36)
MCV RBC AUTO: 85.8 FL — SIGNIFICANT CHANGE UP (ref 80–100)
MONOCYTES # BLD AUTO: 0.69 K/UL — SIGNIFICANT CHANGE UP (ref 0–0.9)
MONOCYTES NFR BLD AUTO: 8 % — SIGNIFICANT CHANGE UP (ref 2–14)
NEUTROPHILS # BLD AUTO: 3.17 K/UL — SIGNIFICANT CHANGE UP (ref 1.8–7.4)
NEUTROPHILS NFR BLD AUTO: 36.9 % — LOW (ref 43–77)
NRBC # BLD: 0 /100 WBCS — SIGNIFICANT CHANGE UP (ref 0–0)
PLATELET # BLD AUTO: 256 K/UL — SIGNIFICANT CHANGE UP (ref 150–400)
POTASSIUM SERPL-MCNC: 4.3 MMOL/L — SIGNIFICANT CHANGE UP (ref 3.5–5.3)
POTASSIUM SERPL-SCNC: 4.3 MMOL/L — SIGNIFICANT CHANGE UP (ref 3.5–5.3)
PROT SERPL-MCNC: 6.6 GM/DL — SIGNIFICANT CHANGE UP (ref 6–8.3)
RBC # BLD: 4.57 M/UL — SIGNIFICANT CHANGE UP (ref 3.8–5.2)
RBC # FLD: 13.2 % — SIGNIFICANT CHANGE UP (ref 10.3–14.5)
SODIUM SERPL-SCNC: 139 MMOL/L — SIGNIFICANT CHANGE UP (ref 135–145)
WBC # BLD: 8.6 K/UL — SIGNIFICANT CHANGE UP (ref 3.8–10.5)
WBC # FLD AUTO: 8.6 K/UL — SIGNIFICANT CHANGE UP (ref 3.8–10.5)

## 2018-03-18 RX ORDER — RANITIDINE HYDROCHLORIDE 150 MG/1
10 TABLET, FILM COATED ORAL
Qty: 140 | Refills: 0
Start: 2018-03-18 | End: 2018-03-24

## 2018-03-18 RX ADMIN — ONDANSETRON 4 MILLIGRAM(S): 8 TABLET, FILM COATED ORAL at 01:52

## 2018-03-18 RX ADMIN — SODIUM CHLORIDE 1000 MILLILITER(S): 9 INJECTION INTRAMUSCULAR; INTRAVENOUS; SUBCUTANEOUS at 01:52

## 2018-03-18 RX ADMIN — PANTOPRAZOLE SODIUM 40 MILLIGRAM(S): 20 TABLET, DELAYED RELEASE ORAL at 01:53

## 2018-03-18 RX ADMIN — SODIUM CHLORIDE 3 MILLILITER(S): 9 INJECTION INTRAMUSCULAR; INTRAVENOUS; SUBCUTANEOUS at 01:57

## 2018-03-18 NOTE — ED PROVIDER NOTE - MEDICAL DECISION MAKING DETAILS
labs reviewed. patient received phenergan, protonix, maalox, viscous lidocaine and ivfs. patient currently feels well and states she is ready to go home. patient is now discharged with care instructions. patient reports having scheduled appointment with gastroenterologist this upcoming week. Discussed results and outcome of testing with the patient.  Patient advised to please follow up with their primary care doctor within the next 24 hours and return to the Emergency Department for worsening symptoms or any other concerns.  Patient advised that their doctor may call  to follow up on the specific results of the tests performed today in the emergency department.

## 2018-03-18 NOTE — ED PROVIDER NOTE - OBJECTIVE STATEMENT
Pertinent PMH/PSH/FHx/SHx and Review of Systems contained within:  53 yo f with PMH of PUD, former multisubstance abuse and schizophrenia presents in ED c/o burning abdominal pain today. No aggravating or relieving factors, No fever/chills, No photophobia/eye pain/changes in vision, No ear pain/sore throat/dysphagia, No chest pain/palpitations, no SOB/cough/wheeze/stridor, No N/V/D, no dysuria/frequency/discharge, No neck/back pain, no rash, no changes in neurological status/function.

## 2018-04-18 ENCOUNTER — EMERGENCY (EMERGENCY)
Facility: HOSPITAL | Age: 53
LOS: 0 days | Discharge: HOME | End: 2018-04-18
Attending: EMERGENCY MEDICINE
Payer: MEDICARE

## 2018-04-18 VITALS
DIASTOLIC BLOOD PRESSURE: 78 MMHG | RESPIRATION RATE: 16 BRPM | HEIGHT: 61 IN | OXYGEN SATURATION: 94 % | SYSTOLIC BLOOD PRESSURE: 136 MMHG | TEMPERATURE: 98 F | WEIGHT: 147.93 LBS | HEART RATE: 80 BPM

## 2018-04-18 VITALS
HEART RATE: 69 BPM | DIASTOLIC BLOOD PRESSURE: 85 MMHG | OXYGEN SATURATION: 97 % | RESPIRATION RATE: 18 BRPM | SYSTOLIC BLOOD PRESSURE: 158 MMHG | TEMPERATURE: 98 F

## 2018-04-18 DIAGNOSIS — F32.9 MAJOR DEPRESSIVE DISORDER, SINGLE EPISODE, UNSPECIFIED: ICD-10-CM

## 2018-04-18 DIAGNOSIS — F14.11 COCAINE ABUSE, IN REMISSION: ICD-10-CM

## 2018-04-18 DIAGNOSIS — R10.11 RIGHT UPPER QUADRANT PAIN: ICD-10-CM

## 2018-04-18 DIAGNOSIS — R11.10 VOMITING, UNSPECIFIED: ICD-10-CM

## 2018-04-18 DIAGNOSIS — E11.9 TYPE 2 DIABETES MELLITUS WITHOUT COMPLICATIONS: ICD-10-CM

## 2018-04-18 DIAGNOSIS — F20.9 SCHIZOPHRENIA, UNSPECIFIED: ICD-10-CM

## 2018-04-18 DIAGNOSIS — K21.9 GASTRO-ESOPHAGEAL REFLUX DISEASE WITHOUT ESOPHAGITIS: ICD-10-CM

## 2018-04-18 DIAGNOSIS — F31.9 BIPOLAR DISORDER, UNSPECIFIED: ICD-10-CM

## 2018-04-18 DIAGNOSIS — F10.10 ALCOHOL ABUSE, UNCOMPLICATED: ICD-10-CM

## 2018-04-18 LAB
ALBUMIN SERPL ELPH-MCNC: 4 G/DL — SIGNIFICANT CHANGE UP (ref 3.3–5)
ALP SERPL-CCNC: 95 U/L — SIGNIFICANT CHANGE UP (ref 40–120)
ALT FLD-CCNC: 19 U/L — SIGNIFICANT CHANGE UP (ref 12–78)
ANION GAP SERPL CALC-SCNC: 9 MMOL/L — SIGNIFICANT CHANGE UP (ref 5–17)
AST SERPL-CCNC: 14 U/L — LOW (ref 15–37)
BASOPHILS # BLD AUTO: 0.04 K/UL — SIGNIFICANT CHANGE UP (ref 0–0.2)
BASOPHILS NFR BLD AUTO: 0.5 % — SIGNIFICANT CHANGE UP (ref 0–2)
BILIRUB SERPL-MCNC: 0.8 MG/DL — SIGNIFICANT CHANGE UP (ref 0.2–1.2)
BUN SERPL-MCNC: 9 MG/DL — SIGNIFICANT CHANGE UP (ref 7–23)
CALCIUM SERPL-MCNC: 9.5 MG/DL — SIGNIFICANT CHANGE UP (ref 8.5–10.1)
CHLORIDE SERPL-SCNC: 104 MMOL/L — SIGNIFICANT CHANGE UP (ref 96–108)
CO2 SERPL-SCNC: 25 MMOL/L — SIGNIFICANT CHANGE UP (ref 22–31)
CREAT SERPL-MCNC: 0.42 MG/DL — LOW (ref 0.5–1.3)
EOSINOPHIL # BLD AUTO: 0.1 K/UL — SIGNIFICANT CHANGE UP (ref 0–0.5)
EOSINOPHIL NFR BLD AUTO: 1.1 % — SIGNIFICANT CHANGE UP (ref 0–6)
GLUCOSE SERPL-MCNC: 166 MG/DL — HIGH (ref 70–99)
HCT VFR BLD CALC: 43.7 % — SIGNIFICANT CHANGE UP (ref 34.5–45)
HGB BLD-MCNC: 15.2 G/DL — SIGNIFICANT CHANGE UP (ref 11.5–15.5)
IMM GRANULOCYTES NFR BLD AUTO: 0.1 % — SIGNIFICANT CHANGE UP (ref 0–1.5)
LACTATE SERPL-SCNC: 1.5 MMOL/L — SIGNIFICANT CHANGE UP (ref 0.7–2)
LIDOCAIN IGE QN: 71 U/L — LOW (ref 73–393)
LYMPHOCYTES # BLD AUTO: 3.78 K/UL — HIGH (ref 1–3.3)
LYMPHOCYTES # BLD AUTO: 43.4 % — SIGNIFICANT CHANGE UP (ref 13–44)
MCHC RBC-ENTMCNC: 28.8 PG — SIGNIFICANT CHANGE UP (ref 27–34)
MCHC RBC-ENTMCNC: 34.8 GM/DL — SIGNIFICANT CHANGE UP (ref 32–36)
MCV RBC AUTO: 82.8 FL — SIGNIFICANT CHANGE UP (ref 80–100)
MONOCYTES # BLD AUTO: 0.71 K/UL — SIGNIFICANT CHANGE UP (ref 0–0.9)
MONOCYTES NFR BLD AUTO: 8.2 % — SIGNIFICANT CHANGE UP (ref 2–14)
NEUTROPHILS # BLD AUTO: 4.07 K/UL — SIGNIFICANT CHANGE UP (ref 1.8–7.4)
NEUTROPHILS NFR BLD AUTO: 46.7 % — SIGNIFICANT CHANGE UP (ref 43–77)
NRBC # BLD: 0 /100 WBCS — SIGNIFICANT CHANGE UP (ref 0–0)
PLATELET # BLD AUTO: 297 K/UL — SIGNIFICANT CHANGE UP (ref 150–400)
POTASSIUM SERPL-MCNC: 3.9 MMOL/L — SIGNIFICANT CHANGE UP (ref 3.5–5.3)
POTASSIUM SERPL-SCNC: 3.9 MMOL/L — SIGNIFICANT CHANGE UP (ref 3.5–5.3)
PROT SERPL-MCNC: 7.4 GM/DL — SIGNIFICANT CHANGE UP (ref 6–8.3)
RBC # BLD: 5.28 M/UL — HIGH (ref 3.8–5.2)
RBC # FLD: 12.1 % — SIGNIFICANT CHANGE UP (ref 10.3–14.5)
SODIUM SERPL-SCNC: 138 MMOL/L — SIGNIFICANT CHANGE UP (ref 135–145)
WBC # BLD: 8.71 K/UL — SIGNIFICANT CHANGE UP (ref 3.8–10.5)
WBC # FLD AUTO: 8.71 K/UL — SIGNIFICANT CHANGE UP (ref 3.8–10.5)

## 2018-04-18 PROCEDURE — 99285 EMERGENCY DEPT VISIT HI MDM: CPT | Mod: 25

## 2018-04-18 PROCEDURE — 99053 MED SERV 10PM-8AM 24 HR FAC: CPT

## 2018-04-18 PROCEDURE — 74177 CT ABD & PELVIS W/CONTRAST: CPT | Mod: 26

## 2018-04-18 RX ORDER — SODIUM CHLORIDE 9 MG/ML
1000 INJECTION INTRAMUSCULAR; INTRAVENOUS; SUBCUTANEOUS ONCE
Qty: 0 | Refills: 0 | Status: COMPLETED | OUTPATIENT
Start: 2018-04-18 | End: 2018-04-18

## 2018-04-18 RX ORDER — MORPHINE SULFATE 50 MG/1
2 CAPSULE, EXTENDED RELEASE ORAL ONCE
Qty: 0 | Refills: 0 | Status: DISCONTINUED | OUTPATIENT
Start: 2018-04-18 | End: 2018-04-18

## 2018-04-18 RX ORDER — ONDANSETRON 8 MG/1
4 TABLET, FILM COATED ORAL ONCE
Qty: 0 | Refills: 0 | Status: COMPLETED | OUTPATIENT
Start: 2018-04-18 | End: 2018-04-18

## 2018-04-18 RX ORDER — IOHEXOL 300 MG/ML
30 INJECTION, SOLUTION INTRAVENOUS ONCE
Qty: 0 | Refills: 0 | Status: COMPLETED | OUTPATIENT
Start: 2018-04-18 | End: 2018-04-18

## 2018-04-18 RX ORDER — SODIUM CHLORIDE 9 MG/ML
3 INJECTION INTRAMUSCULAR; INTRAVENOUS; SUBCUTANEOUS ONCE
Qty: 0 | Refills: 0 | Status: COMPLETED | OUTPATIENT
Start: 2018-04-18 | End: 2018-04-18

## 2018-04-18 RX ORDER — PANTOPRAZOLE SODIUM 20 MG/1
40 TABLET, DELAYED RELEASE ORAL ONCE
Qty: 0 | Refills: 0 | Status: COMPLETED | OUTPATIENT
Start: 2018-04-18 | End: 2018-04-18

## 2018-04-18 RX ORDER — LIDOCAINE 4 G/100G
10 CREAM TOPICAL ONCE
Qty: 0 | Refills: 0 | Status: COMPLETED | OUTPATIENT
Start: 2018-04-18 | End: 2018-04-18

## 2018-04-18 RX ADMIN — MORPHINE SULFATE 2 MILLIGRAM(S): 50 CAPSULE, EXTENDED RELEASE ORAL at 11:31

## 2018-04-18 RX ADMIN — ONDANSETRON 4 MILLIGRAM(S): 8 TABLET, FILM COATED ORAL at 06:49

## 2018-04-18 RX ADMIN — SODIUM CHLORIDE 1000 MILLILITER(S): 9 INJECTION INTRAMUSCULAR; INTRAVENOUS; SUBCUTANEOUS at 06:50

## 2018-04-18 RX ADMIN — MORPHINE SULFATE 2 MILLIGRAM(S): 50 CAPSULE, EXTENDED RELEASE ORAL at 10:35

## 2018-04-18 RX ADMIN — SODIUM CHLORIDE 3 MILLILITER(S): 9 INJECTION INTRAMUSCULAR; INTRAVENOUS; SUBCUTANEOUS at 06:50

## 2018-04-18 RX ADMIN — PANTOPRAZOLE SODIUM 40 MILLIGRAM(S): 20 TABLET, DELAYED RELEASE ORAL at 06:49

## 2018-04-18 NOTE — ED ADULT TRIAGE NOTE - CHIEF COMPLAINT QUOTE
BIBA,  pt c/o RUQ pain x 2 days with vomiting.  pt states she had her gallbladder removed in Feb at [facility]/.  [facility]  ulcers

## 2018-04-18 NOTE — ED ADULT NURSE NOTE - OBJECTIVE STATEMENT
Patient presents in ED complaining of burning bilateral flank pain x 3 days and vomitting. Prior Hx of gallbladder surgery.

## 2018-04-18 NOTE — ED PROVIDER NOTE - PROGRESS NOTE DETAILS
Results reported to patient--grossly benign  Pt. reports feeling better after meds  pt. agrees to f/u with primary care outpt. as well as GI  pt. understands to return to ED if symptoms worsen; will d/c

## 2018-04-18 NOTE — ED PROVIDER NOTE - OBJECTIVE STATEMENT
Pertinent PMH/PSH/FHx/SHx and Review of Systems contained within:  53 yo f with PMH of cholecystectomy, former ivda, shizopaffective disorder and dm presents in ED c/o ruq pain and nbnb vomitus since she was last discharged. Patient seen several times for the same symptoms, her last time here was 1 month ago. When asked if she followed up patient denied stating that the MD didn't take her insurance. But then when I told her I'd make sure to provide her with a list of providers to make sure she could follow up she said she already has an appointment. No aggravating or relieving factors, No fever/chills, No photophobia/eye pain/changes in vision, No ear pain/sore throat/dysphagia, No chest pain/palpitations, no SOB/cough/wheeze/stridor, No D, no dysuria/frequency/discharge, No neck/back pain, no rash, no changes in neurological status/function. Pertinent PMH/PSH/FHx/SHx and Review of Systems contained within:  51 yo f with PMH of radiculopathy, cholecystectomy, former ivda, shizopaffective disorder and dm presents in ED c/o ruq pain and nbnb vomitus since she was last discharged. Patient seen several times for the same symptoms, her last time here was 1 month ago. When asked if she followed up patient denied stating that the MD didn't take her insurance. But then when I told her I'd make sure to provide her with a list of providers to make sure she could follow up she said she already has an appointment. No aggravating or relieving factors, No fever/chills, No photophobia/eye pain/changes in vision, No ear pain/sore throat/dysphagia, No chest pain/palpitations, no SOB/cough/wheeze/stridor, No D, no dysuria/frequency/discharge, No neck/back pain, no rash, no changes in neurological status/function.

## 2018-04-18 NOTE — ED ADULT NURSE NOTE - CHIEF COMPLAINT QUOTE
BIBA,  pt c/o RUQ pain x 2 days with vomiting.  pt states she had her gallbladder removed in Feb at [facility]/.  [facility]  ulcers

## 2018-04-18 NOTE — ED PROVIDER NOTE - MEDICAL DECISION MAKING DETAILS
Pending labs and imaging. Patient care transitioned to incoming team.  All decisions regarding the progression of care will be made at their discretion.

## 2019-10-24 ENCOUNTER — EMERGENCY (EMERGENCY)
Facility: HOSPITAL | Age: 54
LOS: 0 days | Discharge: HOME | End: 2019-10-25
Attending: EMERGENCY MEDICINE
Payer: COMMERCIAL

## 2019-10-24 VITALS
SYSTOLIC BLOOD PRESSURE: 151 MMHG | HEART RATE: 94 BPM | HEIGHT: 62 IN | DIASTOLIC BLOOD PRESSURE: 72 MMHG | WEIGHT: 173.94 LBS | TEMPERATURE: 98 F | OXYGEN SATURATION: 98 % | RESPIRATION RATE: 20 BRPM

## 2019-10-24 DIAGNOSIS — Z90.49 ACQUIRED ABSENCE OF OTHER SPECIFIED PARTS OF DIGESTIVE TRACT: ICD-10-CM

## 2019-10-24 DIAGNOSIS — R07.9 CHEST PAIN, UNSPECIFIED: ICD-10-CM

## 2019-10-24 DIAGNOSIS — J44.9 CHRONIC OBSTRUCTIVE PULMONARY DISEASE, UNSPECIFIED: ICD-10-CM

## 2019-10-24 DIAGNOSIS — I73.9 PERIPHERAL VASCULAR DISEASE, UNSPECIFIED: ICD-10-CM

## 2019-10-24 DIAGNOSIS — M54.10 RADICULOPATHY, SITE UNSPECIFIED: ICD-10-CM

## 2019-10-24 LAB
ALBUMIN SERPL ELPH-MCNC: 3.8 G/DL — SIGNIFICANT CHANGE UP (ref 3.3–5)
ALP SERPL-CCNC: 125 U/L — HIGH (ref 40–120)
ALT FLD-CCNC: 32 U/L — SIGNIFICANT CHANGE UP (ref 12–78)
ANION GAP SERPL CALC-SCNC: 6 MMOL/L — SIGNIFICANT CHANGE UP (ref 5–17)
APTT BLD: 27.5 SEC — LOW (ref 28.5–37)
AST SERPL-CCNC: 20 U/L — SIGNIFICANT CHANGE UP (ref 15–37)
BASOPHILS # BLD AUTO: 0.07 K/UL — SIGNIFICANT CHANGE UP (ref 0–0.2)
BASOPHILS NFR BLD AUTO: 0.8 % — SIGNIFICANT CHANGE UP (ref 0–2)
BILIRUB SERPL-MCNC: 0.2 MG/DL — SIGNIFICANT CHANGE UP (ref 0.2–1.2)
BUN SERPL-MCNC: 11 MG/DL — SIGNIFICANT CHANGE UP (ref 7–23)
CALCIUM SERPL-MCNC: 9.4 MG/DL — SIGNIFICANT CHANGE UP (ref 8.5–10.1)
CHLORIDE SERPL-SCNC: 105 MMOL/L — SIGNIFICANT CHANGE UP (ref 96–108)
CO2 SERPL-SCNC: 28 MMOL/L — SIGNIFICANT CHANGE UP (ref 22–31)
CREAT SERPL-MCNC: 0.63 MG/DL — SIGNIFICANT CHANGE UP (ref 0.5–1.3)
D DIMER BLD IA.RAPID-MCNC: <150 NG/ML DDU — SIGNIFICANT CHANGE UP
EOSINOPHIL # BLD AUTO: 0.2 K/UL — SIGNIFICANT CHANGE UP (ref 0–0.5)
EOSINOPHIL NFR BLD AUTO: 2.4 % — SIGNIFICANT CHANGE UP (ref 0–6)
GLUCOSE SERPL-MCNC: 294 MG/DL — HIGH (ref 70–99)
HCG SERPL-ACNC: 4 MIU/ML — SIGNIFICANT CHANGE UP
HCT VFR BLD CALC: 38.8 % — SIGNIFICANT CHANGE UP (ref 34.5–45)
HGB BLD-MCNC: 13.2 G/DL — SIGNIFICANT CHANGE UP (ref 11.5–15.5)
IMM GRANULOCYTES NFR BLD AUTO: 0.5 % — SIGNIFICANT CHANGE UP (ref 0–1.5)
INR BLD: 0.93 RATIO — SIGNIFICANT CHANGE UP (ref 0.88–1.16)
LYMPHOCYTES # BLD AUTO: 3.9 K/UL — HIGH (ref 1–3.3)
LYMPHOCYTES # BLD AUTO: 45.9 % — HIGH (ref 13–44)
MCHC RBC-ENTMCNC: 29.1 PG — SIGNIFICANT CHANGE UP (ref 27–34)
MCHC RBC-ENTMCNC: 34 GM/DL — SIGNIFICANT CHANGE UP (ref 32–36)
MCV RBC AUTO: 85.7 FL — SIGNIFICANT CHANGE UP (ref 80–100)
MONOCYTES # BLD AUTO: 0.54 K/UL — SIGNIFICANT CHANGE UP (ref 0–0.9)
MONOCYTES NFR BLD AUTO: 6.4 % — SIGNIFICANT CHANGE UP (ref 2–14)
NEUTROPHILS # BLD AUTO: 3.74 K/UL — SIGNIFICANT CHANGE UP (ref 1.8–7.4)
NEUTROPHILS NFR BLD AUTO: 44 % — SIGNIFICANT CHANGE UP (ref 43–77)
NRBC # BLD: 0 /100 WBCS — SIGNIFICANT CHANGE UP (ref 0–0)
PLATELET # BLD AUTO: 258 K/UL — SIGNIFICANT CHANGE UP (ref 150–400)
POTASSIUM SERPL-MCNC: 4.1 MMOL/L — SIGNIFICANT CHANGE UP (ref 3.5–5.3)
POTASSIUM SERPL-SCNC: 4.1 MMOL/L — SIGNIFICANT CHANGE UP (ref 3.5–5.3)
PROT SERPL-MCNC: 7 GM/DL — SIGNIFICANT CHANGE UP (ref 6–8.3)
PROTHROM AB SERPL-ACNC: 10.4 SEC — SIGNIFICANT CHANGE UP (ref 10–12.9)
RBC # BLD: 4.53 M/UL — SIGNIFICANT CHANGE UP (ref 3.8–5.2)
RBC # FLD: 12.6 % — SIGNIFICANT CHANGE UP (ref 10.3–14.5)
SODIUM SERPL-SCNC: 139 MMOL/L — SIGNIFICANT CHANGE UP (ref 135–145)
TROPONIN I SERPL-MCNC: <.015 NG/ML — SIGNIFICANT CHANGE UP (ref 0.01–0.04)
WBC # BLD: 8.49 K/UL — SIGNIFICANT CHANGE UP (ref 3.8–10.5)
WBC # FLD AUTO: 8.49 K/UL — SIGNIFICANT CHANGE UP (ref 3.8–10.5)

## 2019-10-24 PROCEDURE — 71046 X-RAY EXAM CHEST 2 VIEWS: CPT | Mod: 26

## 2019-10-24 PROCEDURE — 99285 EMERGENCY DEPT VISIT HI MDM: CPT

## 2019-10-24 PROCEDURE — 93010 ELECTROCARDIOGRAM REPORT: CPT

## 2019-10-24 RX ORDER — ASPIRIN/CALCIUM CARB/MAGNESIUM 324 MG
243 TABLET ORAL ONCE
Refills: 0 | Status: COMPLETED | OUTPATIENT
Start: 2019-10-24 | End: 2019-10-24

## 2019-10-24 RX ORDER — OXYCODONE AND ACETAMINOPHEN 5; 325 MG/1; MG/1
1 TABLET ORAL ONCE
Refills: 0 | Status: DISCONTINUED | OUTPATIENT
Start: 2019-10-24 | End: 2019-10-24

## 2019-10-24 RX ADMIN — Medication 243 MILLIGRAM(S): at 20:47

## 2019-10-24 RX ADMIN — OXYCODONE AND ACETAMINOPHEN 1 TABLET(S): 5; 325 TABLET ORAL at 20:47

## 2019-10-24 NOTE — ED ADULT NURSE NOTE - OBJECTIVE STATEMENT
[place] felt a sharp tight pain to L side of chest. PT is an everyday smoker w/ hx of COPD and asthma. denies SOB.

## 2019-10-24 NOTE — ED ADULT NURSE NOTE - NSIMPLEMENTINTERV_GEN_ALL_ED
Implemented All Universal Safety Interventions:  [place] to call system. Call bell, personal items and telephone within reach. Instruct patient to call for assistance. Room bathroom lighting operational. Non-slip footwear when patient is off stretcher. Physically safe environment: no spills, clutter or unnecessary equipment. Stretcher in lowest position, wheels locked, appropriate side rails in place.

## 2019-10-24 NOTE — ED PROVIDER NOTE - PATIENT PORTAL LINK FT
You can access the FollowMyHealth Patient Portal offered by  by registering at the following website: [email or web address]. By joining [employer]’s FollowMyHealth portal, you will also be able to view your health information using other applications (apps) compatible with our system.

## 2019-10-24 NOTE — ED PROVIDER NOTE - OBJECTIVE STATEMENT
Pertinent PMH/PSH/FHx/SHx and Review of Systems contained within:    53yo F w PMH of COPD/asthma, radiculopathy, PVD, s/p cholecystectomy, DM, schizoaffective disorder, +smoker presents to ED for eval of sharp upper L sided CP.  Pt states she went for a walk with her  & they smoked cigarettes.  Pt then returned home and after bending over, noted sx.  Denies fever, chills, cough, dizziness, syncope, diaphoresis, nausea, vomiting.  Pt states she takes 81mg ASA daily, has not taken any other meds for sx PTA.    No fever/chills, No photophobia/eye pain/changes in vision, No ear pain/sore throat/dysphagia, No palpitations, no cough/wheeze/stridor, No abdominal pain, No neck/back pain, no rash, no changes in neurological status/function.

## 2019-10-24 NOTE — ED ADULT TRIAGE NOTE - CHIEF COMPLAINT QUOTE
Pt c/o Left side chest pain and SOB X TODAY, Pt stated pain is intermittent, descried as shooting . H/O htn. dm, asthma, copd, bipolar , depression and anxiety

## 2019-10-24 NOTE — ED PROVIDER NOTE - CLINICAL SUMMARY MEDICAL DECISION MAKING FREE TEXT BOX
Pt w CP, labs/imaging/EKG neg for acute pathology, trop neg x2.  Pt VSS in NAD.  Educated on smoking cessation.  Discussed results and outcome of testing with the patient, given copy as well.  Patient advised to please follow up with their primary care doctor within the next 24 hours and return to the Emergency Department for worsening symptoms or any other concerns.  Patient advised that their doctor may call  to follow up on the specific results of the tests performed today in the emergency department.

## 2019-10-25 VITALS
HEART RATE: 77 BPM | SYSTOLIC BLOOD PRESSURE: 137 MMHG | OXYGEN SATURATION: 99 % | RESPIRATION RATE: 16 BRPM | TEMPERATURE: 99 F | DIASTOLIC BLOOD PRESSURE: 75 MMHG

## 2019-10-25 LAB — TROPONIN I SERPL-MCNC: <.015 NG/ML — SIGNIFICANT CHANGE UP (ref 0.01–0.04)

## 2019-11-19 ENCOUNTER — EMERGENCY (EMERGENCY)
Facility: HOSPITAL | Age: 54
LOS: 0 days | Discharge: HOME | End: 2019-11-20
Attending: EMERGENCY MEDICINE
Payer: COMMERCIAL

## 2019-11-19 VITALS
SYSTOLIC BLOOD PRESSURE: 185 MMHG | DIASTOLIC BLOOD PRESSURE: 98 MMHG | OXYGEN SATURATION: 100 % | TEMPERATURE: 98 F | RESPIRATION RATE: 17 BRPM | HEART RATE: 74 BPM

## 2019-11-19 VITALS
HEART RATE: 78 BPM | TEMPERATURE: 98 F | HEIGHT: 63 IN | RESPIRATION RATE: 20 BRPM | OXYGEN SATURATION: 98 % | WEIGHT: 177.91 LBS | SYSTOLIC BLOOD PRESSURE: 180 MMHG | DIASTOLIC BLOOD PRESSURE: 95 MMHG

## 2019-11-19 DIAGNOSIS — F20.9 SCHIZOPHRENIA, UNSPECIFIED: ICD-10-CM

## 2019-11-19 DIAGNOSIS — E11.9 TYPE 2 DIABETES MELLITUS WITHOUT COMPLICATIONS: ICD-10-CM

## 2019-11-19 DIAGNOSIS — I10 ESSENTIAL (PRIMARY) HYPERTENSION: ICD-10-CM

## 2019-11-19 PROCEDURE — 99284 EMERGENCY DEPT VISIT MOD MDM: CPT

## 2019-11-19 PROCEDURE — 93010 ELECTROCARDIOGRAM REPORT: CPT

## 2019-11-19 RX ORDER — METOCLOPRAMIDE HCL 10 MG
10 TABLET ORAL ONCE
Refills: 0 | Status: COMPLETED | OUTPATIENT
Start: 2019-11-19 | End: 2019-11-19

## 2019-11-19 RX ORDER — ACETAMINOPHEN 500 MG
975 TABLET ORAL ONCE
Refills: 0 | Status: COMPLETED | OUTPATIENT
Start: 2019-11-19 | End: 2019-11-19

## 2019-11-19 RX ADMIN — Medication 10 MILLIGRAM(S): at 22:31

## 2019-11-19 RX ADMIN — Medication 20 MILLIGRAM(S): at 22:31

## 2019-11-19 RX ADMIN — Medication 975 MILLIGRAM(S): at 22:31

## 2019-11-19 RX ADMIN — Medication 975 MILLIGRAM(S): at 23:03

## 2019-11-19 NOTE — ED ADULT TRIAGE NOTE - CHIEF COMPLAINT QUOTE
Pt BIBA for HTN. State she felt her heart racing and went to her doctor and noted her to have HTN. Took an unk med without relief. C/o HA at this time.

## 2019-11-19 NOTE — ED PROVIDER NOTE - OBJECTIVE STATEMENT
Pt is a 55 yo lady with a pmhx of HTn, Dm who presents to the ED with hypertension. She has been monitoring her bp and today it was elevated so she called EMS. En route, she developed a headache. No chest pain, no sob, no n/v/d, no abdominal pain. No symptoms now, asking for food. Says is compliant with her enalapril.

## 2019-11-19 NOTE — ED PROVIDER NOTE - PATIENT PORTAL LINK FT
You can access the FollowMyHealth Patient Portal offered by [facility] by registering at the following website: [email or web address]. By joining [facility]’s FollowMyHealth portal, you will also be able to view your health information using other applications (apps) compatible with our system.

## 2019-11-19 NOTE — ED ADULT TRIAGE NOTE - SOURCE OF INFORMATION
Patient

## 2019-12-16 ENCOUNTER — INPATIENT (INPATIENT)
Facility: HOSPITAL | Age: 54
LOS: 1 days | Discharge: HOME | End: 2019-12-18
Attending: GENERAL ACUTE CARE HOSPITAL | Admitting: GENERAL ACUTE CARE HOSPITAL
Payer: COMMERCIAL

## 2019-12-16 VITALS
TEMPERATURE: 98 F | HEIGHT: 66 IN | SYSTOLIC BLOOD PRESSURE: 152 MMHG | WEIGHT: 166.01 LBS | OXYGEN SATURATION: 98 % | DIASTOLIC BLOOD PRESSURE: 77 MMHG | HEART RATE: 80 BPM | RESPIRATION RATE: 18 BRPM

## 2019-12-16 LAB
ALBUMIN SERPL ELPH-MCNC: 3.7 G/DL — SIGNIFICANT CHANGE UP (ref 3.3–5)
ALP SERPL-CCNC: 135 U/L — HIGH (ref 40–120)
ALT FLD-CCNC: 31 U/L — SIGNIFICANT CHANGE UP (ref 12–78)
ANION GAP SERPL CALC-SCNC: 5 MMOL/L — SIGNIFICANT CHANGE UP (ref 5–17)
APPEARANCE UR: CLEAR — SIGNIFICANT CHANGE UP
APTT BLD: 27.6 SEC — LOW (ref 28.5–37)
AST SERPL-CCNC: 16 U/L — SIGNIFICANT CHANGE UP (ref 15–37)
BASOPHILS # BLD AUTO: 0.07 K/UL — SIGNIFICANT CHANGE UP (ref 0–0.2)
BASOPHILS NFR BLD AUTO: 0.8 % — SIGNIFICANT CHANGE UP (ref 0–2)
BILIRUB SERPL-MCNC: 0.3 MG/DL — SIGNIFICANT CHANGE UP (ref 0.2–1.2)
BILIRUB UR-MCNC: NEGATIVE — SIGNIFICANT CHANGE UP
BUN SERPL-MCNC: 13 MG/DL — SIGNIFICANT CHANGE UP (ref 7–23)
CALCIUM SERPL-MCNC: 9.4 MG/DL — SIGNIFICANT CHANGE UP (ref 8.5–10.1)
CHLORIDE SERPL-SCNC: 102 MMOL/L — SIGNIFICANT CHANGE UP (ref 96–108)
CO2 SERPL-SCNC: 29 MMOL/L — SIGNIFICANT CHANGE UP (ref 22–31)
COLOR SPEC: YELLOW — SIGNIFICANT CHANGE UP
CREAT SERPL-MCNC: 0.98 MG/DL — SIGNIFICANT CHANGE UP (ref 0.5–1.3)
DIFF PNL FLD: NEGATIVE — SIGNIFICANT CHANGE UP
EOSINOPHIL # BLD AUTO: 0.22 K/UL — SIGNIFICANT CHANGE UP (ref 0–0.5)
EOSINOPHIL NFR BLD AUTO: 2.4 % — SIGNIFICANT CHANGE UP (ref 0–6)
GLUCOSE SERPL-MCNC: 181 MG/DL — HIGH (ref 70–99)
GLUCOSE UR QL: NEGATIVE MG/DL — SIGNIFICANT CHANGE UP
HCG SERPL-ACNC: 5 MIU/ML — SIGNIFICANT CHANGE UP
HCT VFR BLD CALC: 39.5 % — SIGNIFICANT CHANGE UP (ref 34.5–45)
HGB BLD-MCNC: 13.2 G/DL — SIGNIFICANT CHANGE UP (ref 11.5–15.5)
IMM GRANULOCYTES NFR BLD AUTO: 0.3 % — SIGNIFICANT CHANGE UP (ref 0–1.5)
INR BLD: 0.95 RATIO — SIGNIFICANT CHANGE UP (ref 0.88–1.16)
KETONES UR-MCNC: NEGATIVE — SIGNIFICANT CHANGE UP
LACTATE SERPL-SCNC: 1.3 MMOL/L — SIGNIFICANT CHANGE UP (ref 0.7–2)
LEUKOCYTE ESTERASE UR-ACNC: ABNORMAL
LYMPHOCYTES # BLD AUTO: 4.67 K/UL — HIGH (ref 1–3.3)
LYMPHOCYTES # BLD AUTO: 51.1 % — HIGH (ref 13–44)
MCHC RBC-ENTMCNC: 28.8 PG — SIGNIFICANT CHANGE UP (ref 27–34)
MCHC RBC-ENTMCNC: 33.4 GM/DL — SIGNIFICANT CHANGE UP (ref 32–36)
MCV RBC AUTO: 86.2 FL — SIGNIFICANT CHANGE UP (ref 80–100)
MONOCYTES # BLD AUTO: 0.64 K/UL — SIGNIFICANT CHANGE UP (ref 0–0.9)
MONOCYTES NFR BLD AUTO: 7 % — SIGNIFICANT CHANGE UP (ref 2–14)
NEUTROPHILS # BLD AUTO: 3.51 K/UL — SIGNIFICANT CHANGE UP (ref 1.8–7.4)
NEUTROPHILS NFR BLD AUTO: 38.4 % — LOW (ref 43–77)
NITRITE UR-MCNC: NEGATIVE — SIGNIFICANT CHANGE UP
NRBC # BLD: 0 /100 WBCS — SIGNIFICANT CHANGE UP (ref 0–0)
PH UR: 6 — SIGNIFICANT CHANGE UP (ref 5–8)
PLATELET # BLD AUTO: 258 K/UL — SIGNIFICANT CHANGE UP (ref 150–400)
POTASSIUM SERPL-MCNC: 4.1 MMOL/L — SIGNIFICANT CHANGE UP (ref 3.5–5.3)
POTASSIUM SERPL-SCNC: 4.1 MMOL/L — SIGNIFICANT CHANGE UP (ref 3.5–5.3)
PROT SERPL-MCNC: 7 GM/DL — SIGNIFICANT CHANGE UP (ref 6–8.3)
PROT UR-MCNC: NEGATIVE MG/DL — SIGNIFICANT CHANGE UP
PROTHROM AB SERPL-ACNC: 10.6 SEC — SIGNIFICANT CHANGE UP (ref 10–12.9)
RBC # BLD: 4.58 M/UL — SIGNIFICANT CHANGE UP (ref 3.8–5.2)
RBC # FLD: 12.7 % — SIGNIFICANT CHANGE UP (ref 10.3–14.5)
SODIUM SERPL-SCNC: 136 MMOL/L — SIGNIFICANT CHANGE UP (ref 135–145)
SP GR SPEC: 1.01 — SIGNIFICANT CHANGE UP (ref 1.01–1.02)
UROBILINOGEN FLD QL: NEGATIVE MG/DL — SIGNIFICANT CHANGE UP
WBC # BLD: 9.14 K/UL — SIGNIFICANT CHANGE UP (ref 3.8–10.5)
WBC # FLD AUTO: 9.14 K/UL — SIGNIFICANT CHANGE UP (ref 3.8–10.5)

## 2019-12-16 PROCEDURE — 99285 EMERGENCY DEPT VISIT HI MDM: CPT

## 2019-12-16 PROCEDURE — 71045 X-RAY EXAM CHEST 1 VIEW: CPT | Mod: 26

## 2019-12-16 PROCEDURE — 93010 ELECTROCARDIOGRAM REPORT: CPT

## 2019-12-16 RX ORDER — SODIUM CHLORIDE 9 MG/ML
1000 INJECTION INTRAMUSCULAR; INTRAVENOUS; SUBCUTANEOUS ONCE
Refills: 0 | Status: COMPLETED | OUTPATIENT
Start: 2019-12-16 | End: 2019-12-16

## 2019-12-16 RX ORDER — KETOROLAC TROMETHAMINE 30 MG/ML
30 SYRINGE (ML) INJECTION ONCE
Refills: 0 | Status: DISCONTINUED | OUTPATIENT
Start: 2019-12-16 | End: 2019-12-16

## 2019-12-16 RX ORDER — CEFTRIAXONE 500 MG/1
1000 INJECTION, POWDER, FOR SOLUTION INTRAMUSCULAR; INTRAVENOUS ONCE
Refills: 0 | Status: COMPLETED | OUTPATIENT
Start: 2019-12-16 | End: 2019-12-16

## 2019-12-16 RX ADMIN — CEFTRIAXONE 100 MILLIGRAM(S): 500 INJECTION, POWDER, FOR SOLUTION INTRAMUSCULAR; INTRAVENOUS at 22:43

## 2019-12-16 RX ADMIN — SODIUM CHLORIDE 1000 MILLILITER(S): 9 INJECTION INTRAMUSCULAR; INTRAVENOUS; SUBCUTANEOUS at 22:43

## 2019-12-16 RX ADMIN — Medication 30 MILLIGRAM(S): at 22:43

## 2019-12-16 NOTE — ED PROVIDER NOTE - PHYSICAL EXAMINATION
Gen: Alert, NAD  Head: NC, AT, EOMI, normal lids/conjunctiva  ENT: normal hearing, patent oropharynx, MMM  Neck: supple, no tenderness/meningismus, FROM, Trachea midline  Pulm: Bilateral clear BS, normal resp effort, no wheeze/stridor/retractions  CV: RRR, no M/R/G, +dist pulses  Abd: soft, +spurapubic TTP, ND, +BS, no guarding/rebound tenderness  Mskel: no edema/erythema/cyanosis  Skin: no rash  Neuro: no sensory/motor deficits

## 2019-12-16 NOTE — ED PROVIDER NOTE - OBJECTIVE STATEMENT
Pertinent PMH/PSH/FHx/SHx and Review of Systems contained within:    55yo F w PMH of Bipolar   Crack cocaine use  11 years clean  Depressed    DM (diabetes mellitus)    ETOH abuse    Schizophrenia. Pertinent PMH/PSH/FHx/SHx and Review of Systems contained within:    55yo F w PMH of Bipolar d/o, DM, Schizophrenia presents to ED for admission for IV abx.  Pt states she has been having dysuria & UTI sx for about 1wk.  PMD (Kaya) had pt CT done 12/3/19, demonstrated wedge shaped insult L kidney, due to infxn.  Cx 12/9/19 demonstrated multi drug resistant Morganella Morgani & PMD instructed pt to come to ED for IV abx. Pertinent PMH/PSH/FHx/SHx and Review of Systems contained within:    55yo F w PMH of Bipolar d/o, DM, Schizophrenia presents to ED for admission for IV abx.  Pt states she has been having dysuria & UTI sx for about 1wk.  PMD (Kaya) had pt CT done 12/3/19, demonstrated wedge shaped insult L kidney, due to infxn.  Cx 12/9/19 demonstrated multi drug resistant Morganella Morgani & PMD instructed pt to come to ED for IV abx.    No fever/chills, No photophobia/eye pain/changes in vision, No ear pain/sore throat/dysphagia, No chest pain/palpitations, no SOB/cough/wheeze/stridor, +abdominal pain, +dysuria/frequency, No neck/back pain, no rash, no changes in neurological status/function.

## 2019-12-16 NOTE — ED ADULT NURSE NOTE - OBJECTIVE STATEMENT
Patient was sent in by her MD for possible iv anti biotics for pylonephritis pt c/o pain with urination

## 2019-12-16 NOTE — ED ADULT NURSE NOTE - NSIMPLEMENTINTERV_GEN_ALL_ED
Implemented All Universal Safety Interventions:  [place] to call system. Call bell, personal items and telephone within reach. Instruct patient to call for assistance. Room bathroom lighting operational. Non-slip footwear when patient is off stretcher. Physically safe environment: no spills, clutter or unnecessary equipment. Stretcher in lowest position, wheels locked, appropriate side rails in place.

## 2019-12-17 DIAGNOSIS — F31.9 BIPOLAR DISORDER, UNSPECIFIED: ICD-10-CM

## 2019-12-17 DIAGNOSIS — N12 TUBULO-INTERSTITIAL NEPHRITIS, NOT SPECIFIED AS ACUTE OR CHRONIC: ICD-10-CM

## 2019-12-17 DIAGNOSIS — E11.9 TYPE 2 DIABETES MELLITUS WITHOUT COMPLICATIONS: ICD-10-CM

## 2019-12-17 LAB
ANION GAP SERPL CALC-SCNC: 5 MMOL/L — SIGNIFICANT CHANGE UP (ref 5–17)
BACTERIA # UR AUTO: ABNORMAL
BASOPHILS # BLD AUTO: 0.04 K/UL — SIGNIFICANT CHANGE UP (ref 0–0.2)
BASOPHILS NFR BLD AUTO: 0.5 % — SIGNIFICANT CHANGE UP (ref 0–2)
BUN SERPL-MCNC: 21 MG/DL — SIGNIFICANT CHANGE UP (ref 7–23)
CALCIUM SERPL-MCNC: 8.8 MG/DL — SIGNIFICANT CHANGE UP (ref 8.5–10.1)
CHLORIDE SERPL-SCNC: 103 MMOL/L — SIGNIFICANT CHANGE UP (ref 96–108)
CO2 SERPL-SCNC: 28 MMOL/L — SIGNIFICANT CHANGE UP (ref 22–31)
CREAT SERPL-MCNC: 0.75 MG/DL — SIGNIFICANT CHANGE UP (ref 0.5–1.3)
EOSINOPHIL # BLD AUTO: 0.22 K/UL — SIGNIFICANT CHANGE UP (ref 0–0.5)
EOSINOPHIL NFR BLD AUTO: 2.6 % — SIGNIFICANT CHANGE UP (ref 0–6)
EPI CELLS # UR: SIGNIFICANT CHANGE UP
GLUCOSE BLDC GLUCOMTR-MCNC: 157 MG/DL — HIGH (ref 70–99)
GLUCOSE BLDC GLUCOMTR-MCNC: 223 MG/DL — HIGH (ref 70–99)
GLUCOSE BLDC GLUCOMTR-MCNC: 239 MG/DL — HIGH (ref 70–99)
GLUCOSE BLDC GLUCOMTR-MCNC: 265 MG/DL — HIGH (ref 70–99)
GLUCOSE SERPL-MCNC: 180 MG/DL — HIGH (ref 70–99)
HCT VFR BLD CALC: 37.9 % — SIGNIFICANT CHANGE UP (ref 34.5–45)
HGB BLD-MCNC: 12.6 G/DL — SIGNIFICANT CHANGE UP (ref 11.5–15.5)
IMM GRANULOCYTES NFR BLD AUTO: 0.4 % — SIGNIFICANT CHANGE UP (ref 0–1.5)
LYMPHOCYTES # BLD AUTO: 4.62 K/UL — HIGH (ref 1–3.3)
LYMPHOCYTES # BLD AUTO: 55.5 % — HIGH (ref 13–44)
MCHC RBC-ENTMCNC: 28.9 PG — SIGNIFICANT CHANGE UP (ref 27–34)
MCHC RBC-ENTMCNC: 33.2 GM/DL — SIGNIFICANT CHANGE UP (ref 32–36)
MCV RBC AUTO: 86.9 FL — SIGNIFICANT CHANGE UP (ref 80–100)
MONOCYTES # BLD AUTO: 0.57 K/UL — SIGNIFICANT CHANGE UP (ref 0–0.9)
MONOCYTES NFR BLD AUTO: 6.8 % — SIGNIFICANT CHANGE UP (ref 2–14)
NEUTROPHILS # BLD AUTO: 2.85 K/UL — SIGNIFICANT CHANGE UP (ref 1.8–7.4)
NEUTROPHILS NFR BLD AUTO: 34.2 % — LOW (ref 43–77)
NRBC # BLD: 0 /100 WBCS — SIGNIFICANT CHANGE UP (ref 0–0)
PLATELET # BLD AUTO: 259 K/UL — SIGNIFICANT CHANGE UP (ref 150–400)
POTASSIUM SERPL-MCNC: 4.2 MMOL/L — SIGNIFICANT CHANGE UP (ref 3.5–5.3)
POTASSIUM SERPL-SCNC: 4.2 MMOL/L — SIGNIFICANT CHANGE UP (ref 3.5–5.3)
RBC # BLD: 4.36 M/UL — SIGNIFICANT CHANGE UP (ref 3.8–5.2)
RBC # FLD: 12.5 % — SIGNIFICANT CHANGE UP (ref 10.3–14.5)
RBC CASTS # UR COMP ASSIST: SIGNIFICANT CHANGE UP /HPF (ref 0–4)
SODIUM SERPL-SCNC: 136 MMOL/L — SIGNIFICANT CHANGE UP (ref 135–145)
WBC # BLD: 8.33 K/UL — SIGNIFICANT CHANGE UP (ref 3.8–10.5)
WBC # FLD AUTO: 8.33 K/UL — SIGNIFICANT CHANGE UP (ref 3.8–10.5)
WBC UR QL: SIGNIFICANT CHANGE UP

## 2019-12-17 PROCEDURE — 99221 1ST HOSP IP/OBS SF/LOW 40: CPT

## 2019-12-17 PROCEDURE — 12345: CPT | Mod: NC

## 2019-12-17 PROCEDURE — 99223 1ST HOSP IP/OBS HIGH 75: CPT

## 2019-12-17 RX ORDER — MONTELUKAST 4 MG/1
0 TABLET, CHEWABLE ORAL
Qty: 0 | Refills: 0 | DISCHARGE

## 2019-12-17 RX ORDER — GABAPENTIN 400 MG/1
1 CAPSULE ORAL
Qty: 0 | Refills: 0 | DISCHARGE

## 2019-12-17 RX ORDER — OXYCODONE AND ACETAMINOPHEN 5; 325 MG/1; MG/1
1 TABLET ORAL EVERY 4 HOURS
Refills: 0 | Status: DISCONTINUED | OUTPATIENT
Start: 2019-12-17 | End: 2019-12-18

## 2019-12-17 RX ORDER — TOPIRAMATE 25 MG
50 TABLET ORAL DAILY
Refills: 0 | Status: DISCONTINUED | OUTPATIENT
Start: 2019-12-17 | End: 2019-12-18

## 2019-12-17 RX ORDER — ALBUTEROL 90 UG/1
1 AEROSOL, METERED ORAL
Refills: 0 | Status: DISCONTINUED | OUTPATIENT
Start: 2019-12-17 | End: 2019-12-18

## 2019-12-17 RX ORDER — TIZANIDINE 4 MG/1
0 TABLET ORAL
Qty: 0 | Refills: 0 | DISCHARGE

## 2019-12-17 RX ORDER — CILOSTAZOL 100 MG/1
50 TABLET ORAL
Refills: 0 | Status: DISCONTINUED | OUTPATIENT
Start: 2019-12-17 | End: 2019-12-18

## 2019-12-17 RX ORDER — TOPIRAMATE 25 MG
2 TABLET ORAL
Qty: 0 | Refills: 0 | DISCHARGE

## 2019-12-17 RX ORDER — OXCARBAZEPINE 300 MG/1
1 TABLET, FILM COATED ORAL
Qty: 0 | Refills: 0 | DISCHARGE

## 2019-12-17 RX ORDER — ACLIDINIUM BROMIDE 400 UG/1
0 POWDER, METERED RESPIRATORY (INHALATION)
Qty: 0 | Refills: 0 | DISCHARGE

## 2019-12-17 RX ORDER — ASPIRIN/CALCIUM CARB/MAGNESIUM 324 MG
1 TABLET ORAL
Qty: 0 | Refills: 0 | DISCHARGE

## 2019-12-17 RX ORDER — CEFTRIAXONE 500 MG/1
1000 INJECTION, POWDER, FOR SOLUTION INTRAMUSCULAR; INTRAVENOUS EVERY 24 HOURS
Refills: 0 | Status: COMPLETED | OUTPATIENT
Start: 2019-12-17 | End: 2019-12-18

## 2019-12-17 RX ORDER — DEXTROSE 50 % IN WATER 50 %
25 SYRINGE (ML) INTRAVENOUS ONCE
Refills: 0 | Status: DISCONTINUED | OUTPATIENT
Start: 2019-12-17 | End: 2019-12-18

## 2019-12-17 RX ORDER — FLUOCINOLONE ACETONIDE 0.25 MG/G
1 CREAM TOPICAL
Qty: 0 | Refills: 0 | DISCHARGE

## 2019-12-17 RX ORDER — CLONAZEPAM 1 MG
0.5 TABLET ORAL DAILY
Refills: 0 | Status: DISCONTINUED | OUTPATIENT
Start: 2019-12-17 | End: 2019-12-18

## 2019-12-17 RX ORDER — OXYBUTYNIN CHLORIDE 5 MG
1 TABLET ORAL
Qty: 0 | Refills: 0 | DISCHARGE

## 2019-12-17 RX ORDER — CILOSTAZOL 100 MG/1
1 TABLET ORAL
Qty: 0 | Refills: 0 | DISCHARGE

## 2019-12-17 RX ORDER — DEXTROSE 50 % IN WATER 50 %
15 SYRINGE (ML) INTRAVENOUS ONCE
Refills: 0 | Status: DISCONTINUED | OUTPATIENT
Start: 2019-12-17 | End: 2019-12-18

## 2019-12-17 RX ORDER — NATEGLINIDE 60 MG/1
1 TABLET, COATED ORAL
Qty: 0 | Refills: 0 | DISCHARGE

## 2019-12-17 RX ORDER — OXCARBAZEPINE 300 MG/1
600 TABLET, FILM COATED ORAL
Refills: 0 | Status: DISCONTINUED | OUTPATIENT
Start: 2019-12-17 | End: 2019-12-18

## 2019-12-17 RX ORDER — BACLOFEN 100 %
1 POWDER (GRAM) MISCELLANEOUS
Qty: 0 | Refills: 0 | DISCHARGE

## 2019-12-17 RX ORDER — CHOLECALCIFEROL (VITAMIN D3) 125 MCG
0 CAPSULE ORAL
Qty: 0 | Refills: 0 | DISCHARGE

## 2019-12-17 RX ORDER — PANTOPRAZOLE SODIUM 20 MG/1
40 TABLET, DELAYED RELEASE ORAL
Refills: 0 | Status: DISCONTINUED | OUTPATIENT
Start: 2019-12-17 | End: 2019-12-18

## 2019-12-17 RX ORDER — INSULIN LISPRO 100/ML
VIAL (ML) SUBCUTANEOUS
Refills: 0 | Status: DISCONTINUED | OUTPATIENT
Start: 2019-12-17 | End: 2019-12-18

## 2019-12-17 RX ORDER — TRAZODONE HCL 50 MG
0 TABLET ORAL
Qty: 0 | Refills: 0 | DISCHARGE

## 2019-12-17 RX ORDER — ASPIRIN/CALCIUM CARB/MAGNESIUM 324 MG
81 TABLET ORAL DAILY
Refills: 0 | Status: DISCONTINUED | OUTPATIENT
Start: 2019-12-17 | End: 2019-12-18

## 2019-12-17 RX ORDER — ATORVASTATIN CALCIUM 80 MG/1
10 TABLET, FILM COATED ORAL AT BEDTIME
Refills: 0 | Status: DISCONTINUED | OUTPATIENT
Start: 2019-12-17 | End: 2019-12-18

## 2019-12-17 RX ORDER — CLONAZEPAM 1 MG
0 TABLET ORAL
Qty: 0 | Refills: 0 | DISCHARGE

## 2019-12-17 RX ORDER — TIOTROPIUM BROMIDE 18 UG/1
2 CAPSULE ORAL; RESPIRATORY (INHALATION)
Qty: 0 | Refills: 0 | DISCHARGE

## 2019-12-17 RX ORDER — ZOLPIDEM TARTRATE 10 MG/1
5 TABLET ORAL AT BEDTIME
Refills: 0 | Status: DISCONTINUED | OUTPATIENT
Start: 2019-12-17 | End: 2019-12-18

## 2019-12-17 RX ORDER — ATORVASTATIN CALCIUM 80 MG/1
1 TABLET, FILM COATED ORAL
Qty: 0 | Refills: 0 | DISCHARGE

## 2019-12-17 RX ORDER — IPRATROPIUM BROMIDE 0.2 MG/ML
2.5 SOLUTION, NON-ORAL INHALATION
Qty: 0 | Refills: 0 | DISCHARGE

## 2019-12-17 RX ORDER — CLONAZEPAM 1 MG
1 TABLET ORAL
Qty: 0 | Refills: 0 | DISCHARGE

## 2019-12-17 RX ORDER — CLONAZEPAM 1 MG
0.5 TABLET ORAL ONCE
Refills: 0 | Status: DISCONTINUED | OUTPATIENT
Start: 2019-12-17 | End: 2019-12-17

## 2019-12-17 RX ORDER — MIRABEGRON 50 MG/1
0 TABLET, EXTENDED RELEASE ORAL
Qty: 0 | Refills: 0 | DISCHARGE

## 2019-12-17 RX ORDER — ZOLPIDEM TARTRATE 10 MG/1
5 TABLET ORAL AT BEDTIME
Refills: 0 | Status: DISCONTINUED | OUTPATIENT
Start: 2019-12-17 | End: 2019-12-17

## 2019-12-17 RX ORDER — OXCARBAZEPINE 300 MG/1
300 TABLET, FILM COATED ORAL ONCE
Refills: 0 | Status: COMPLETED | OUTPATIENT
Start: 2019-12-17 | End: 2019-12-17

## 2019-12-17 RX ORDER — ALBUTEROL 90 UG/1
2 AEROSOL, METERED ORAL
Qty: 0 | Refills: 0 | DISCHARGE

## 2019-12-17 RX ORDER — METOPROLOL TARTRATE 50 MG
25 TABLET ORAL
Refills: 0 | Status: DISCONTINUED | OUTPATIENT
Start: 2019-12-17 | End: 2019-12-18

## 2019-12-17 RX ORDER — MONTELUKAST 4 MG/1
10 TABLET, CHEWABLE ORAL AT BEDTIME
Refills: 0 | Status: DISCONTINUED | OUTPATIENT
Start: 2019-12-17 | End: 2019-12-18

## 2019-12-17 RX ORDER — AMLODIPINE BESYLATE 2.5 MG/1
2.5 TABLET ORAL DAILY
Refills: 0 | Status: DISCONTINUED | OUTPATIENT
Start: 2019-12-17 | End: 2019-12-18

## 2019-12-17 RX ORDER — PENTOSAN POLYSULFATE SODIUM 100 MG
100 CAPSULE ORAL
Qty: 0 | Refills: 0 | DISCHARGE

## 2019-12-17 RX ORDER — METOPROLOL TARTRATE 50 MG
1 TABLET ORAL
Qty: 0 | Refills: 0 | DISCHARGE

## 2019-12-17 RX ORDER — SITAGLIPTIN AND METFORMIN HYDROCHLORIDE 500; 50 MG/1; MG/1
1 TABLET, FILM COATED ORAL
Qty: 0 | Refills: 0 | DISCHARGE

## 2019-12-17 RX ORDER — SODIUM CHLORIDE 9 MG/ML
1000 INJECTION, SOLUTION INTRAVENOUS
Refills: 0 | Status: DISCONTINUED | OUTPATIENT
Start: 2019-12-17 | End: 2019-12-18

## 2019-12-17 RX ORDER — GLUCAGON INJECTION, SOLUTION 0.5 MG/.1ML
1 INJECTION, SOLUTION SUBCUTANEOUS ONCE
Refills: 0 | Status: DISCONTINUED | OUTPATIENT
Start: 2019-12-17 | End: 2019-12-18

## 2019-12-17 RX ORDER — ZOLPIDEM TARTRATE 10 MG/1
1 TABLET ORAL
Qty: 0 | Refills: 0 | DISCHARGE

## 2019-12-17 RX ORDER — OMEPRAZOLE 10 MG/1
1 CAPSULE, DELAYED RELEASE ORAL
Qty: 0 | Refills: 0 | DISCHARGE

## 2019-12-17 RX ORDER — NICOTINE POLACRILEX 2 MG
1 GUM BUCCAL ONCE
Refills: 0 | Status: COMPLETED | OUTPATIENT
Start: 2019-12-17 | End: 2019-12-17

## 2019-12-17 RX ORDER — MECLIZINE HCL 12.5 MG
1 TABLET ORAL
Qty: 0 | Refills: 0 | DISCHARGE

## 2019-12-17 RX ORDER — OXCARBAZEPINE 300 MG/1
2 TABLET, FILM COATED ORAL
Qty: 0 | Refills: 0 | DISCHARGE

## 2019-12-17 RX ORDER — OMEGA-3 ACID ETHYL ESTERS 1 G
2 CAPSULE ORAL
Refills: 0 | Status: DISCONTINUED | OUTPATIENT
Start: 2019-12-17 | End: 2019-12-18

## 2019-12-17 RX ORDER — NYSTATIN CREAM 100000 [USP'U]/G
1 CREAM TOPICAL EVERY 12 HOURS
Refills: 0 | Status: DISCONTINUED | OUTPATIENT
Start: 2019-12-17 | End: 2019-12-18

## 2019-12-17 RX ORDER — FLUCONAZOLE 150 MG/1
150 TABLET ORAL
Refills: 0 | Status: DISCONTINUED | OUTPATIENT
Start: 2019-12-17 | End: 2019-12-18

## 2019-12-17 RX ORDER — DEXTROSE 50 % IN WATER 50 %
12.5 SYRINGE (ML) INTRAVENOUS ONCE
Refills: 0 | Status: DISCONTINUED | OUTPATIENT
Start: 2019-12-17 | End: 2019-12-18

## 2019-12-17 RX ORDER — GABAPENTIN 400 MG/1
100 CAPSULE ORAL THREE TIMES A DAY
Refills: 0 | Status: DISCONTINUED | OUTPATIENT
Start: 2019-12-17 | End: 2019-12-18

## 2019-12-17 RX ORDER — NICOTINE POLACRILEX 2 MG
1 GUM BUCCAL DAILY
Refills: 0 | Status: DISCONTINUED | OUTPATIENT
Start: 2019-12-17 | End: 2019-12-18

## 2019-12-17 RX ORDER — NICOTINE POLACRILEX 2 MG
1 GUM BUCCAL
Qty: 0 | Refills: 0 | DISCHARGE

## 2019-12-17 RX ADMIN — GABAPENTIN 100 MILLIGRAM(S): 400 CAPSULE ORAL at 05:06

## 2019-12-17 RX ADMIN — AMLODIPINE BESYLATE 2.5 MILLIGRAM(S): 2.5 TABLET ORAL at 05:06

## 2019-12-17 RX ADMIN — OXYCODONE AND ACETAMINOPHEN 1 TABLET(S): 5; 325 TABLET ORAL at 05:10

## 2019-12-17 RX ADMIN — Medication 2 GRAM(S): at 18:34

## 2019-12-17 RX ADMIN — CILOSTAZOL 50 MILLIGRAM(S): 100 TABLET ORAL at 05:06

## 2019-12-17 RX ADMIN — Medication 1 PATCH: at 00:58

## 2019-12-17 RX ADMIN — Medication 1 PATCH: at 12:26

## 2019-12-17 RX ADMIN — Medication 1: at 11:50

## 2019-12-17 RX ADMIN — Medication 0.5 MILLIGRAM(S): at 11:51

## 2019-12-17 RX ADMIN — Medication 50 MILLIGRAM(S): at 12:27

## 2019-12-17 RX ADMIN — OXYCODONE AND ACETAMINOPHEN 1 TABLET(S): 5; 325 TABLET ORAL at 22:57

## 2019-12-17 RX ADMIN — OXCARBAZEPINE 300 MILLIGRAM(S): 300 TABLET, FILM COATED ORAL at 05:06

## 2019-12-17 RX ADMIN — Medication 0.5 MILLIGRAM(S): at 01:33

## 2019-12-17 RX ADMIN — OXYCODONE AND ACETAMINOPHEN 1 TABLET(S): 5; 325 TABLET ORAL at 22:30

## 2019-12-17 RX ADMIN — Medication 1 PATCH: at 07:01

## 2019-12-17 RX ADMIN — Medication 25 MILLIGRAM(S): at 05:06

## 2019-12-17 RX ADMIN — Medication 2: at 18:33

## 2019-12-17 RX ADMIN — GABAPENTIN 100 MILLIGRAM(S): 400 CAPSULE ORAL at 15:36

## 2019-12-17 RX ADMIN — FLUCONAZOLE 150 MILLIGRAM(S): 150 TABLET ORAL at 18:34

## 2019-12-17 RX ADMIN — OXYCODONE AND ACETAMINOPHEN 1 TABLET(S): 5; 325 TABLET ORAL at 06:09

## 2019-12-17 RX ADMIN — Medication 2 GRAM(S): at 05:07

## 2019-12-17 RX ADMIN — CILOSTAZOL 50 MILLIGRAM(S): 100 TABLET ORAL at 18:33

## 2019-12-17 RX ADMIN — Medication 2: at 08:02

## 2019-12-17 RX ADMIN — ATORVASTATIN CALCIUM 10 MILLIGRAM(S): 80 TABLET, FILM COATED ORAL at 22:26

## 2019-12-17 RX ADMIN — ZOLPIDEM TARTRATE 5 MILLIGRAM(S): 10 TABLET ORAL at 22:30

## 2019-12-17 RX ADMIN — OXCARBAZEPINE 600 MILLIGRAM(S): 300 TABLET, FILM COATED ORAL at 23:15

## 2019-12-17 RX ADMIN — Medication 1 PATCH: at 19:24

## 2019-12-17 RX ADMIN — NYSTATIN CREAM 1 APPLICATION(S): 100000 CREAM TOPICAL at 18:34

## 2019-12-17 RX ADMIN — Medication 25 MILLIGRAM(S): at 18:33

## 2019-12-17 RX ADMIN — GABAPENTIN 100 MILLIGRAM(S): 400 CAPSULE ORAL at 22:26

## 2019-12-17 RX ADMIN — Medication 81 MILLIGRAM(S): at 11:51

## 2019-12-17 RX ADMIN — CEFTRIAXONE 100 MILLIGRAM(S): 500 INJECTION, POWDER, FOR SOLUTION INTRAMUSCULAR; INTRAVENOUS at 05:04

## 2019-12-17 RX ADMIN — MONTELUKAST 10 MILLIGRAM(S): 4 TABLET, CHEWABLE ORAL at 22:26

## 2019-12-17 RX ADMIN — Medication 10 MILLIGRAM(S): at 05:06

## 2019-12-17 NOTE — H&P ADULT - ASSESSMENT
55 y/o Female PMH of Bipolar d/o, DM2, Schizophrenia presents to ED for admission for IV abx.  Pt states she has been having dysuria & UTI Sx for about 1wk.  PMD (Kaya) had pt CT done 12/3/19, demonstrated wedge shaped insult L kidney, due to infxn.  Cx 12/9/19 demonstrated multi drug resistant Morganella Morgani & PMD instructed pt to come to ED for IV abx. Pt was on Cipro to which organism was not susceptible. Denies fever, chills, CP, SOB, dysuria; complaining of left flank pain. Pt started on ceftriaxone.  IMPROVE VTE Individual Risk Assessment          RISK                                                          Points    [  ] Previous VTE                                                3    [  ] Thrombophilia                                             2    [  ] Lower limb paralysis                                    2        (unable to hold up >15 seconds)      [  ] Current Cancer                                             2         (within 6 months)    [  ] Immobilization > 24 hrs                              1    [  ] ICU/CCU stay > 24 hours                            1    [  ] Age > 60                                                    1    IMPROVE VTE Score ____0_____

## 2019-12-17 NOTE — H&P ADULT - NSICDXPASTMEDICALHX_GEN_ALL_CORE_FT
PAST MEDICAL HISTORY:  Bipolar 1 disorder     Crack cocaine use 11 years clean    Depressed     DM (diabetes mellitus)     ETOH abuse     Schizophrenia

## 2019-12-17 NOTE — CONSULT NOTE ADULT - ASSESSMENT
cont zoysn   f/u on blood c/s and urine c/s 53 y/o Female PMH of Bipolar d/o, DM2, Schizophrenia presents to ED for admission for IV abx.  Pt states she has been having dysuria & UTI Sx for about 1wk.  Now seen with   1.Vaginal candidiasis: cont diflucan for three days .Miconazole as outpt ,can use suppository for few days   2.Abnorml Urine c/s results from outside : Outpt urine c.s results reviewed and it grew Morganella which was resistant to cipro and bactrim but ESBL negative. Pt right now on Rocephin .  Urinalysis is negative here.  Urine c/s pending.  if abnormal will treat otherwise D/c antibiotics.

## 2019-12-17 NOTE — H&P ADULT - NSHPPHYSICALEXAM_GEN_ALL_CORE
T(C): 36.6 (16 Dec 2019 23:56), Max: 36.8 (16 Dec 2019 18:26)  T(F): 97.9 (16 Dec 2019 23:56), Max: 98.2 (16 Dec 2019 18:26)  HR: 66 (17 Dec 2019 00:55) (66 - 93)  BP: 140/75 (17 Dec 2019 00:57) (115/64 - 178/102)  BP(mean): --  RR: 16 (17 Dec 2019 00:55) (16 - 18)  SpO2: 97% (17 Dec 2019 00:55) (97% - 99%)    PHYSICAL EXAM:  GENERAL: NAD, well-groomed, well-developed  HEAD:  Atraumatic, Normocephalic  EYES: EOMI, PERRLA, conjunctiva and sclera clear  ENMT: No tonsillar erythema, exudates, or enlargement; Moist mucous membranes,  No lesions  NECK: Supple, No JVD, Normal thyroid  NERVOUS SYSTEM:  Alert & Oriented X3, CN 2-12 intact, no focal deficits  CHEST/LUNG: Clear to percussion bilaterally; No rales, rhonchi, wheezing, or rubs  HEART: Regular rate and rhythm; No murmurs, rubs, or gallops  ABDOMEN: Soft, Nontender, Nondistended; Bowel sounds present, L CVA tenderness  EXTREMITIES:  + Peripheral Pulses, No clubbing, cyanosis, or edema  LYMPH: No lymphadenopathy noted  SKIN: No rashes or lesions

## 2019-12-17 NOTE — PATIENT PROFILE ADULT - DO YOU FEEL LIKE HURTING YOURSELF OR OTHERS?
I just saw that this was in our bin (checking for end of day response to urgents)    Pt needs to be seen in ER for this (see below). I updated Dr MCCRACKEN (on call) and she agrees. S/w mom. Advised to bring pt to ER as she will most likely need CT.   Mom agrees no

## 2019-12-17 NOTE — H&P ADULT - NSHPREVIEWOFSYSTEMS_GEN_ALL_CORE
REVIEW OF SYSTEMS:  CONSTITUTIONAL: No fever, weight loss, or fatigue  EYES: No eye pain, visual disturbances, or discharge  ENMT:  No difficulty hearing, tinnitus, vertigo; No sinus or throat pain  NECK: No pain or stiffness  RESPIRATORY: No cough, wheezing, chills or hemoptysis; No shortness of breath  CARDIOVASCULAR: No chest pain, palpitations, dizziness, or leg swelling  GASTROINTESTINAL: No abdominal or epigastric pain. No nausea, vomiting, or hematemesis; No diarrhea or constipation. No melena or hematochezia.  GENITOURINARY: No dysuria, frequency, hematuria, or incontinence  NEUROLOGICAL: No headaches, memory loss, loss of strength, numbness, or tremors  SKIN: No itching, burning, rashes, or lesions   LYMPH NODES: No enlarged glands  ENDOCRINE: No heat or cold intolerance; No hair loss  MUSCULOSKELETAL: No joint pain or swelling; No muscle, back, or extremity pain  PSYCHIATRIC: No depression, anxiety, mood swings, or difficulty sleeping  HEME/LYMPH: No easy bruising, or bleeding gums  ALLERGY AND IMMUNOLOGIC: No hives or eczema

## 2019-12-17 NOTE — H&P ADULT - NSHPLABSRESULTS_GEN_ALL_CORE
LABS:                        13.2   9.14  )-----------( 258      ( 16 Dec 2019 23:11 )             39.5     -    136  |  102  |  13  ----------------------------<  181<H>  4.1   |  29  |  0.98    Ca    9.4      16 Dec 2019 23:11    TPro  7.0  /  Alb  3.7  /  TBili  0.3  /  DBili  x   /  AST  16  /  ALT  31  /  AlkPhos  135<H>  12-    PT/INR - ( 16 Dec 2019 23:11 )   PT: 10.6 sec;   INR: 0.95 ratio         PTT - ( 16 Dec 2019 23:11 )  PTT:27.6 sec  Urinalysis Basic - ( 16 Dec 2019 23:08 )    Color: Yellow / Appearance: Clear / S.010 / pH: x  Gluc: x / Ketone: Negative  / Bili: Negative / Urobili: Negative mg/dL   Blood: x / Protein: Negative mg/dL / Nitrite: Negative   Leuk Esterase: Trace / RBC: 0-2 /HPF / WBC 3-5   Sq Epi: x / Non Sq Epi: Few / Bacteria: Few      CAPILLARY BLOOD GLUCOSE  Lactate, Blood: 1.3 mmol/L (16.19 @ 23:08)        RADIOLOGY & ADDITIONAL TESTS:    Imaging Personally Reviewed:  [ ] YES  [ ] NO LABS:                        13.2   9.14  )-----------( 258      ( 16 Dec 2019 23:11 )             39.5     12-    136  |  102  |  13  ----------------------------<  181<H>  4.1   |  29  |  0.98    Ca    9.4      16 Dec 2019 23:11    TPro  7.0  /  Alb  3.7  /  TBili  0.3  /  DBili  x   /  AST  16  /  ALT  31  /  AlkPhos  135<H>  12-    PT/INR - ( 16 Dec 2019 23:11 )   PT: 10.6 sec;   INR: 0.95 ratio         PTT - ( 16 Dec 2019 23:11 )  PTT:27.6 sec  Urinalysis Basic - ( 16 Dec 2019 23:08 )    Color: Yellow / Appearance: Clear / S.010 / pH: x  Gluc: x / Ketone: Negative  / Bili: Negative / Urobili: Negative mg/dL   Blood: x / Protein: Negative mg/dL / Nitrite: Negative   Leuk Esterase: Trace / RBC: 0-2 /HPF / WBC 3-5   Sq Epi: x / Non Sq Epi: Few / Bacteria: Few      CAPILLARY BLOOD GLUCOSE  Lactate, Blood: 1.3 mmol/L (12.16.19 @ 23:08)  HCG 5  urine C&S: Morganella Morgani sensitive to Rocephin ( full C&S on chart, with CT report)

## 2019-12-17 NOTE — H&P ADULT - HISTORY OF PRESENT ILLNESS
55 y/o Female PMH of Bipolar d/o, DM2, Schizophrenia presents to ED for admission for IV abx.  Pt states she has been having dysuria & UTI Sx for about 1wk.  PMD (Kaya) had pt CT done 12/3/19, demonstrated wedge shaped insult L kidney, due to infxn.  Cx 12/9/19 demonstrated multi drug resistant Morganella Morgani & PMD instructed pt to come to ED for IV abx. Pt was on Cipro to which organism was not susceptible. Denies fever, chills, CP, SOB, dysuria; complaining of left flank pain.

## 2019-12-17 NOTE — CONSULT NOTE ADULT - SUBJECTIVE AND OBJECTIVE BOX
Infectious Diseases - Attending at Dr. Paredes    HPI:  55 y/o Female PMH of Bipolar d/o, DM2, Schizophrenia presents to ED for admission for IV abx.  Pt states she has been having dysuria & UTI Sx for about 1wk.  PMD (Kaya) had pt CT done 12/3/19, demonstrated wedge shaped insult L kidney, due to infxn.  Cx 19 demonstrated multi drug resistant Morganella Morgani & PMD instructed pt to come to ED for IV abx. Pt was on Cipro to which organism was not susceptible. Denies fever, chills, CP, SOB, dysuria; complaining of left flank pain. (17 Dec 2019 02:12)      PAST MEDICAL & SURGICAL HISTORY:  Depressed  Schizophrenia  Bipolar 1 disorder  DM (diabetes mellitus)  ETOH abuse  Crack cocaine use: 11 years clean  No significant past surgical history      Allergies    No Known Allergies    Intolerances        FAMILY HISTORY:  No pertinent family history in first degree relatives      SOCIAL HISTORY:    REVIEW OF SYSTEMS:    Constitutional: No fever, weight loss or fatigue  Eyes: No eye pain, visual disturbances, or discharge  ENT:  No difficulty hearing, tinnitus, vertigo; No sinus or throat pain  Neck: No pain or stiffness  Respiratory: No cough, wheezing, chills or hemoptysis  Cardiovascular: No chest pain, palpitations, shortness of breath, dizziness or leg swelling  Gastrointestinal: No abdominal or epigastric pain. No nausea, vomiting or hematemesis; No diarrhea or constipation. No melena or hematochezia.  Genitourinary: No dysuria, frequency, hematuria or incontinence  Neurological: No headaches, memory loss, loss of strength, numbness or tremors  Skin: No itching, burning, rashes or lesions       MEDICATIONS  (STANDING):  amLODIPine   Tablet 2.5 milliGRAM(s) Oral daily  aspirin enteric coated 81 milliGRAM(s) Oral daily  atorvastatin 10 milliGRAM(s) Oral at bedtime  cefTRIAXone   IVPB 1000 milliGRAM(s) IV Intermittent every 24 hours  cilostazol 50 milliGRAM(s) Oral two times a day  clonazePAM  Tablet 0.5 milliGRAM(s) Oral daily  dextrose 5%. 1000 milliLiter(s) (50 mL/Hr) IV Continuous <Continuous>  dextrose 50% Injectable 12.5 Gram(s) IV Push once  dextrose 50% Injectable 25 Gram(s) IV Push once  dextrose 50% Injectable 25 Gram(s) IV Push once  enalapril 10 milliGRAM(s) Oral daily  fluconAZOLE   Tablet 150 milliGRAM(s) Oral every 48 hours  gabapentin 100 milliGRAM(s) Oral three times a day  insulin lispro (HumaLOG) corrective regimen sliding scale   SubCutaneous three times a day before meals  metoprolol tartrate 25 milliGRAM(s) Oral two times a day  montelukast 10 milliGRAM(s) Oral at bedtime  nicotine - 21 mG/24Hr(s) Patch 1 patch Transdermal daily  nystatin Cream 1 Application(s) Topical every 12 hours  omega-3-Acid Ethyl Esters 2 Gram(s) Oral two times a day  pantoprazole    Tablet 40 milliGRAM(s) Oral before breakfast  topiramate 50 milliGRAM(s) Oral daily    MEDICATIONS  (PRN):  ALBUTerol    90 MICROgram(s) HFA Inhaler 1 Puff(s) Inhalation four times a day PRN Shortness of Breath and/or Wheezing  dextrose 40% Gel 15 Gram(s) Oral once PRN Blood Glucose LESS THAN 70 milliGRAM(s)/deciliter  glucagon  Injectable 1 milliGRAM(s) IntraMuscular once PRN Glucose LESS THAN 70 milligrams/deciliter  oxycodone    5 mG/acetaminophen 325 mG 1 Tablet(s) Oral every 4 hours PRN Severe Pain (7 - 10)  zolpidem 5 milliGRAM(s) Oral at bedtime PRN Insomnia      Vital Signs Last 24 Hrs  T(C): 35.8 (17 Dec 2019 10:36), Max: 36.8 (16 Dec 2019 18:26)  T(F): 96.4 (17 Dec 2019 10:36), Max: 98.2 (16 Dec 2019 18:26)  HR: 63 (17 Dec 2019 10:36) (63 - 93)  BP: 93/59 (17 Dec 2019 10:36) (93/59 - 178/102)  BP(mean): --  RR: 16 (17 Dec 2019 10:36) (16 - 18)  SpO2: 98% (17 Dec 2019 10:36) (95% - 99%)    PHYSICAL EXAM:    Constitutional: NAD, well-groomed, well-developed  HEENT: PERRLA, EOMI, Normal Hearing, MMM  Neck: No LAD, No JVD  Back: Normal spine flexure, No CVA tenderness  Respiratory: CTAB/L  Cardiovascular: S1 and S2, RRR, no M/G/R  Gastrointestinal: BS+, soft, NT/ND  Extremities: No peripheral edema  Vascular: 2+ peripheral pulses  Neurological: A/O x 3, no focal deficits  Skin: No rashes      LABS:                        12.6   8.33  )-----------( 259      ( 17 Dec 2019 07:32 )             37.9         136  |  103  |  21  ----------------------------<  180<H>  4.2   |  28  |  0.75    Ca    8.8      17 Dec 2019 07:32    TPro  7.0  /  Alb  3.7  /  TBili  0.3  /  DBili  x   /  AST  16  /  ALT  31  /  AlkPhos  135<H>  12-16    PT/INR - ( 16 Dec 2019 23:11 )   PT: 10.6 sec;   INR: 0.95 ratio         PTT - ( 16 Dec 2019 23:11 )  PTT:27.6 sec  Urinalysis Basic - ( 16 Dec 2019 23:08 )    Color: Yellow / Appearance: Clear / S.010 / pH: x  Gluc: x / Ketone: Negative  / Bili: Negative / Urobili: Negative mg/dL   Blood: x / Protein: Negative mg/dL / Nitrite: Negative   Leuk Esterase: Trace / RBC: 0-2 /HPF / WBC 3-5   Sq Epi: x / Non Sq Epi: Few / Bacteria: Few        MICROBIOLOGY:  RECENT CULTURES:        RADIOLOGY & ADDITIONAL STUDIES: Infectious Diseases - Attending at Dr. Paredes    HPI:  53 y/o Female PMH of Bipolar d/o, DM2, Schizophrenia presents to ED for admission for IV abx.  Pt states she has been having dysuria & UTI Sx for about 1wk.  PMD (Kaya) had pt CT done 12/3/19, demonstrated wedge shaped insult L kidney, due to infxn.  Cx 19 demonstrated multi drug resistant Morganella Morgani & PMD instructed pt to come to ED for IV abx. Pt was on Cipro to which organism was not susceptible. Denies fever, chills, CP, SOB, dysuria; complaining of left flank pain. (17 Dec 2019 02:12)   Pt is c/o severe vaginal itching and c/o of pain when urinating dysuria to that and not dysuria per se.  Pt right now on rocephin    PAST MEDICAL & SURGICAL HISTORY:  Depressed  Schizophrenia  Bipolar 1 disorder  DM (diabetes mellitus)  ETOH abuse  Crack cocaine use: 11 years clean  No significant past surgical history      Allergies    No Known Allergies    Intolerances        FAMILY HISTORY:  No h/o DM,HTN in mother ,father      SOCIAL HISTORY: non smoker    REVIEW OF SYSTEMS:    Constitutional: No fever, weight loss or fatigue  Eyes: No eye pain, visual disturbances, or discharge  ENT:  No difficulty hearing, tinnitus, vertigo; No sinus or throat pain  Neck: No pain or stiffness  Respiratory: No cough, wheezing, chills or hemoptysis  Cardiovascular: No chest pain, palpitations, shortness of breath, dizziness or leg swelling  Gastrointestinal: No abdominal or epigastric pain. No nausea, vomiting or hematemesis; No diarrhea or constipation. No melena or hematochezia.  Genitourinary:+ vaginal itching, No dysuria, frequency, hematuria or incontinence  Neurological: No headaches, memory loss, loss of strength, numbness or tremors  Skin: No itching, burning, rashes or lesions       MEDICATIONS  (STANDING):  amLODIPine   Tablet 2.5 milliGRAM(s) Oral daily  aspirin enteric coated 81 milliGRAM(s) Oral daily  atorvastatin 10 milliGRAM(s) Oral at bedtime  cefTRIAXone   IVPB 1000 milliGRAM(s) IV Intermittent every 24 hours  cilostazol 50 milliGRAM(s) Oral two times a day  clonazePAM  Tablet 0.5 milliGRAM(s) Oral daily  dextrose 5%. 1000 milliLiter(s) (50 mL/Hr) IV Continuous <Continuous>  dextrose 50% Injectable 12.5 Gram(s) IV Push once  dextrose 50% Injectable 25 Gram(s) IV Push once  dextrose 50% Injectable 25 Gram(s) IV Push once  enalapril 10 milliGRAM(s) Oral daily  fluconAZOLE   Tablet 150 milliGRAM(s) Oral every 48 hours  gabapentin 100 milliGRAM(s) Oral three times a day  insulin lispro (HumaLOG) corrective regimen sliding scale   SubCutaneous three times a day before meals  metoprolol tartrate 25 milliGRAM(s) Oral two times a day  montelukast 10 milliGRAM(s) Oral at bedtime  nicotine - 21 mG/24Hr(s) Patch 1 patch Transdermal daily  nystatin Cream 1 Application(s) Topical every 12 hours  omega-3-Acid Ethyl Esters 2 Gram(s) Oral two times a day  pantoprazole    Tablet 40 milliGRAM(s) Oral before breakfast  topiramate 50 milliGRAM(s) Oral daily    MEDICATIONS  (PRN):  ALBUTerol    90 MICROgram(s) HFA Inhaler 1 Puff(s) Inhalation four times a day PRN Shortness of Breath and/or Wheezing  dextrose 40% Gel 15 Gram(s) Oral once PRN Blood Glucose LESS THAN 70 milliGRAM(s)/deciliter  glucagon  Injectable 1 milliGRAM(s) IntraMuscular once PRN Glucose LESS THAN 70 milligrams/deciliter  oxycodone    5 mG/acetaminophen 325 mG 1 Tablet(s) Oral every 4 hours PRN Severe Pain (7 - 10)  zolpidem 5 milliGRAM(s) Oral at bedtime PRN Insomnia      Vital Signs Last 24 Hrs  T(C): 35.8 (17 Dec 2019 10:36), Max: 36.8 (16 Dec 2019 18:26)  T(F): 96.4 (17 Dec 2019 10:36), Max: 98.2 (16 Dec 2019 18:26)  HR: 63 (17 Dec 2019 10:36) (63 - 93)  BP: 93/59 (17 Dec 2019 10:36) (93/59 - 178/102)  BP(mean): --  RR: 16 (17 Dec 2019 10:36) (16 - 18)  SpO2: 98% (17 Dec 2019 10:36) (95% - 99%)    PHYSICAL EXAM:    Constitutional: NAD, well-groomed, well-developed  HEENT: PERRLA, EOMI, Normal Hearing, MMM  Neck: No LAD, No JVD  Back: Normal spine flexure, No CVA tenderness  Respiratory: CTAB/L  Cardiovascular: S1 and S2, RRR, no M/G/R  Gastrointestinal: BS+, soft, NT/ND  Extremities: No peripheral edema  Vascular: 2+ peripheral pulses  Neurological: A/O x 3, no focal deficits  Skin: No rashes      LABS:                        12.6   8.33  )-----------( 259      ( 17 Dec 2019 07:32 )             37.9         136  |  103  |  21  ----------------------------<  180<H>  4.2   |  28  |  0.75    Ca    8.8      17 Dec 2019 07:32    TPro  7.0  /  Alb  3.7  /  TBili  0.3  /  DBili  x   /  AST  16  /  ALT  31  /  AlkPhos  135<H>  12-16    PT/INR - ( 16 Dec 2019 23:11 )   PT: 10.6 sec;   INR: 0.95 ratio         PTT - ( 16 Dec 2019 23:11 )  PTT:27.6 sec  Urinalysis Basic - ( 16 Dec 2019 23:08 )    Color: Yellow / Appearance: Clear / S.010 / pH: x  Gluc: x / Ketone: Negative  / Bili: Negative / Urobili: Negative mg/dL   Blood: x / Protein: Negative mg/dL / Nitrite: Negative   Leuk Esterase: Trace / RBC: 0-2 /HPF / WBC 3-5   Sq Epi: x / Non Sq Epi: Few / Bacteria: Few        MICROBIOLOGY:  RECENT CULTURES:        RADIOLOGY & ADDITIONAL STUDIES:

## 2019-12-17 NOTE — PATIENT PROFILE ADULT - FLU SEASON?
Bedside verbal report received from [facility], reviewed SBAR, MAR, VS and summary of care. Patient quietly watching TV, call light in reach. Yes...

## 2019-12-17 NOTE — CHART NOTE - NSCHARTNOTEFT_GEN_A_CORE
pt admitted this morning with pyleo was started by pmd on cipro cultures returned Morganella ( results in paper chart )  resistant to cipro  but sensitive to Rocephin. started on antibx. will continue. ID was called from ED  .  continue with current meds for other medical problems pt admitted this morning with pyleo was started by pmd on cipro cultures returned Morganella ( results in paper chart )  resistant to cipro  but sensitive to Rocephin. started on antibx. will continue. ID was called from ED   c/o vaginitis : will start diflucan    case d/w ID     continue with current meds for other medical problems

## 2019-12-18 ENCOUNTER — TRANSCRIPTION ENCOUNTER (OUTPATIENT)
Age: 54
End: 2019-12-18

## 2019-12-18 VITALS
OXYGEN SATURATION: 100 % | RESPIRATION RATE: 17 BRPM | SYSTOLIC BLOOD PRESSURE: 131 MMHG | DIASTOLIC BLOOD PRESSURE: 63 MMHG | TEMPERATURE: 98 F | HEART RATE: 88 BPM

## 2019-12-18 LAB
ANION GAP SERPL CALC-SCNC: 6 MMOL/L — SIGNIFICANT CHANGE UP (ref 5–17)
BUN SERPL-MCNC: 14 MG/DL — SIGNIFICANT CHANGE UP (ref 7–23)
CALCIUM SERPL-MCNC: 9.5 MG/DL — SIGNIFICANT CHANGE UP (ref 8.5–10.1)
CHLORIDE SERPL-SCNC: 102 MMOL/L — SIGNIFICANT CHANGE UP (ref 96–108)
CO2 SERPL-SCNC: 29 MMOL/L — SIGNIFICANT CHANGE UP (ref 22–31)
CREAT SERPL-MCNC: 0.64 MG/DL — SIGNIFICANT CHANGE UP (ref 0.5–1.3)
CULTURE RESULTS: SIGNIFICANT CHANGE UP
GLUCOSE BLDC GLUCOMTR-MCNC: 197 MG/DL — HIGH (ref 70–99)
GLUCOSE BLDC GLUCOMTR-MCNC: 202 MG/DL — HIGH (ref 70–99)
GLUCOSE SERPL-MCNC: 248 MG/DL — HIGH (ref 70–99)
HBA1C BLD-MCNC: 8.2 % — HIGH (ref 4–5.6)
HCT VFR BLD CALC: 39.9 % — SIGNIFICANT CHANGE UP (ref 34.5–45)
HGB BLD-MCNC: 13.4 G/DL — SIGNIFICANT CHANGE UP (ref 11.5–15.5)
MAGNESIUM SERPL-MCNC: 2 MG/DL — SIGNIFICANT CHANGE UP (ref 1.6–2.6)
MCHC RBC-ENTMCNC: 29.4 PG — SIGNIFICANT CHANGE UP (ref 27–34)
MCHC RBC-ENTMCNC: 33.6 GM/DL — SIGNIFICANT CHANGE UP (ref 32–36)
MCV RBC AUTO: 87.5 FL — SIGNIFICANT CHANGE UP (ref 80–100)
NRBC # BLD: 0 /100 WBCS — SIGNIFICANT CHANGE UP (ref 0–0)
PHOSPHATE SERPL-MCNC: 3.6 MG/DL — SIGNIFICANT CHANGE UP (ref 2.5–4.5)
PLATELET # BLD AUTO: 259 K/UL — SIGNIFICANT CHANGE UP (ref 150–400)
POTASSIUM SERPL-MCNC: 4.4 MMOL/L — SIGNIFICANT CHANGE UP (ref 3.5–5.3)
POTASSIUM SERPL-SCNC: 4.4 MMOL/L — SIGNIFICANT CHANGE UP (ref 3.5–5.3)
RBC # BLD: 4.56 M/UL — SIGNIFICANT CHANGE UP (ref 3.8–5.2)
RBC # FLD: 12.8 % — SIGNIFICANT CHANGE UP (ref 10.3–14.5)
SODIUM SERPL-SCNC: 137 MMOL/L — SIGNIFICANT CHANGE UP (ref 135–145)
SPECIMEN SOURCE: SIGNIFICANT CHANGE UP
WBC # BLD: 7.21 K/UL — SIGNIFICANT CHANGE UP (ref 3.8–10.5)
WBC # FLD AUTO: 7.21 K/UL — SIGNIFICANT CHANGE UP (ref 3.8–10.5)

## 2019-12-18 PROCEDURE — 99239 HOSP IP/OBS DSCHRG MGMT >30: CPT

## 2019-12-18 RX ORDER — FLUCONAZOLE 150 MG/1
1 TABLET ORAL
Qty: 2 | Refills: 0
Start: 2019-12-18 | End: 2019-12-19

## 2019-12-18 RX ORDER — AMLODIPINE BESYLATE 2.5 MG/1
1 TABLET ORAL
Qty: 0 | Refills: 0 | DISCHARGE
Start: 2019-12-18

## 2019-12-18 RX ORDER — INFLUENZA VIRUS VACCINE 15; 15; 15; 15 UG/.5ML; UG/.5ML; UG/.5ML; UG/.5ML
0.5 SUSPENSION INTRAMUSCULAR ONCE
Refills: 0 | Status: COMPLETED | OUTPATIENT
Start: 2019-12-18 | End: 2019-12-18

## 2019-12-18 RX ORDER — NYSTATIN CREAM 100000 [USP'U]/G
1 CREAM TOPICAL
Qty: 0 | Refills: 0 | DISCHARGE

## 2019-12-18 RX ORDER — OMEGA-3 ACID ETHYL ESTERS 1 G
2 CAPSULE ORAL
Qty: 0 | Refills: 0 | DISCHARGE
Start: 2019-12-18

## 2019-12-18 RX ORDER — OMEGA-3 ACID ETHYL ESTERS 1 G
1 CAPSULE ORAL
Qty: 0 | Refills: 0 | DISCHARGE

## 2019-12-18 RX ORDER — NYSTATIN CREAM 100000 [USP'U]/G
1 CREAM TOPICAL
Qty: 0 | Refills: 0 | DISCHARGE
Start: 2019-12-18

## 2019-12-18 RX ORDER — MICONAZOLE NITRATE 2 %
1 CREAM (GRAM) TOPICAL
Qty: 3 | Refills: 0
Start: 2019-12-18 | End: 2019-12-20

## 2019-12-18 RX ORDER — ALBUTEROL 90 UG/1
2 AEROSOL, METERED ORAL
Qty: 0 | Refills: 0 | DISCHARGE

## 2019-12-18 RX ORDER — AMLODIPINE BESYLATE 2.5 MG/1
1 TABLET ORAL
Qty: 0 | Refills: 0 | DISCHARGE

## 2019-12-18 RX ADMIN — PANTOPRAZOLE SODIUM 40 MILLIGRAM(S): 20 TABLET, DELAYED RELEASE ORAL at 09:02

## 2019-12-18 RX ADMIN — Medication 1: at 09:01

## 2019-12-18 RX ADMIN — GABAPENTIN 100 MILLIGRAM(S): 400 CAPSULE ORAL at 13:36

## 2019-12-18 RX ADMIN — NYSTATIN CREAM 1 APPLICATION(S): 100000 CREAM TOPICAL at 05:43

## 2019-12-18 RX ADMIN — CEFTRIAXONE 100 MILLIGRAM(S): 500 INJECTION, POWDER, FOR SOLUTION INTRAMUSCULAR; INTRAVENOUS at 05:41

## 2019-12-18 RX ADMIN — GABAPENTIN 100 MILLIGRAM(S): 400 CAPSULE ORAL at 05:41

## 2019-12-18 RX ADMIN — Medication 2 GRAM(S): at 05:42

## 2019-12-18 RX ADMIN — OXYCODONE AND ACETAMINOPHEN 1 TABLET(S): 5; 325 TABLET ORAL at 09:41

## 2019-12-18 RX ADMIN — CILOSTAZOL 50 MILLIGRAM(S): 100 TABLET ORAL at 05:41

## 2019-12-18 RX ADMIN — Medication 81 MILLIGRAM(S): at 13:35

## 2019-12-18 RX ADMIN — Medication 2: at 12:48

## 2019-12-18 RX ADMIN — Medication 50 MILLIGRAM(S): at 13:36

## 2019-12-18 NOTE — DISCHARGE NOTE NURSING/CASE MANAGEMENT/SOCIAL WORK - PATIENT PORTAL LINK FT
You can access the FollowMyHealth Patient Portal offered by [facility] by registering at the following website: [email or web address]. By joining [employer]’s FollowMyHealth portal, you will also be able to view your health information using other applications (apps) compatible with our system.

## 2019-12-18 NOTE — DISCHARGE NOTE PROVIDER - HOSPITAL COURSE
52 year old woman with PMH of asthma, HTN, HLD, panic d/o, DM2, bipolar 2, COPD, lung nodules referred to ER by PMD for IV abx.     PMD (Kaya) had pt CT done 12/3/19, demonstrated wedge shaped insult L kidney.       Cx 12/9/19 demonstrated multi drug resistant Morganella Morgani & PMD instructed pt to come to ED for IV abx. Pt was on Cipro to which organism was not susceptible.     Pt is c/o severe vaginal itching and c/o of pain when urinating and not dysuria per se.  She was given Rocephin IV x 2 days.     ID was consulted, recommend to stop IV abx and started diflucan for vaginal candidiasis. Patient afebrile, no chills, no leukocytosis, no flank pain.     UCx NGTD.     patient will be dishcarged to follow-up with PMD outpatient.     Continue diflucan 1 tab every 48hours for 2 more doses.     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 S/p Rocephin x 2 days    Urinalysis is negative here.    Urine c/s NGTD    Blood Cx NGTD    acute pyelonephritis ruled out. Patient afebrile, no chills, no leukocytosis, no flank pain.     3. Essential HTN - continue with home meds     4. Depression, Schizophrenia, Bipolar 1 disorder --denies SI/HI, continue with home meds , follow-up outpatient     5. DM2, A1c 8.2%, continue with home meds     6. Active tobacco use --education on cessation provided. states has nicotine patches at home and does not need Rx for more.     7. Asthma/COPD -c/w home meds/inhalers . Not in exacerbation. follow-up with PCP for referral to pulmonary for outpatient PFTs     8. Lung nodules, will need referral to pulmonary by PCP for follow-up         RETURN PARAMETERS DISCUSSED WITH PATIENT, PATIENT EXPRESSED UNDERSTANDING AND IS AGREEABLE.        Discharge time : 40 min

## 2019-12-18 NOTE — DISCHARGE NOTE PROVIDER - NSDCMRMEDTOKEN_GEN_ALL_CORE_FT
Ambien 10 mg oral tablet: 1 tab(s) orally once a day (at bedtime), As Needed  amLODIPine 2.5 mg oral tablet: 1 tab(s) orally once a day  aspirin 81 mg oral delayed release tablet: 1 tab(s) orally once a day  cilostazol 50 mg oral tablet: 1 tab(s) orally 2 times a day  clonazePAM 0.5 mg oral tablet: orally once a day  Elmiron: 100 milligram(s) orally 3 times a day  enalapril 20 mg oral tablet: 1 tab(s) orally once a day  fluconazole 150 mg oral tablet: 1 tab(s) orally every 48 hours  gabapentin 600 mg oral tablet: 1 tab(s) orally 3 times a day  ipratropium 500 mcg/2.5 mL inhalation solution: 2.5 milliliter(s) inhaled 4 times a day, As Needed  Janumet 50 mg-500 mg oral tablet: 1 tab(s) orally 2 times a day  Lipitor 20 mg oral tablet: 1 tab(s) orally once a day  metoprolol succinate 50 mg oral tablet, extended release: 1 tab(s) orally once a day  Miconazole 3 vaginal suppository: 1 suppository(ies) intravaginally once a day   nicotine 21 mg/24 hr transdermal film, extended release: 1 patch transdermal once a day  nystatin 100,000 units/g topical cream: 1 application topically every 12 hours  omega-3 polyunsaturated fatty acids ethyl esters 1000 mg oral capsule: 2 cap(s) orally 2 times a day  OXcarbazepine 300 mg oral tablet: 2 tab(s) orally once (at bedtime)  oxybutynin 10 mg/24 hr oral tablet, extended release: 1 tab(s) orally once a day  Percocet 10/325 oral tablet: 1 tab(s) orally 3 times, As Needed  PriLOSEC 20 mg oral delayed release capsule: 1 cap(s) orally once a day  Singulair 10 mg oral tablet: orally once a day (at bedtime)  Starlix 120 mg oral tablet: 1 tab(s) orally 3 times a day (before meals)  tiZANidine 2 mg oral tablet: orally once a day (in the evening)  topiramate 25 mg oral capsule, extended release: 2 tab(s) orally once a day (in the evening)  Tudorza Pressair 400 mcg/inh inhalation powder: inhaled 2 times a day  Ventolin HFA 90 mcg/inh inhalation aerosol: 2 puff(s) inhaled 4 times a day, As Needed  Vitamin D3 50,000 intl units oral capsule: orally once (at bedtime)

## 2019-12-18 NOTE — DISCHARGE NOTE PROVIDER - CARE PROVIDER_API CALL
GYN,   information provided to [facility]  Phone: (   )    -  Fax: (   )    -  Follow Up Time: 1 week    Kaya SCOTT  Phone: (   )    -  Fax: (   )    -  Follow Up Time: 1 week

## 2019-12-18 NOTE — DISCHARGE NOTE PROVIDER - NSDCCPCAREPLAN_GEN_ALL_CORE_FT
PRINCIPAL DISCHARGE DIAGNOSIS  Diagnosis: Pyelonephritis  Assessment and Plan of Treatment:       SECONDARY DISCHARGE DIAGNOSES  Diagnosis: Type 2 diabetes mellitus without complication, without long-term current use of insulin  Assessment and Plan of Treatment: Type 2 diabetes mellitus without complication, without long-term current use of insulin PRINCIPAL DISCHARGE DIAGNOSIS  Diagnosis: Candida vaginitis  Assessment and Plan of Treatment: 52 year old woman with PMH of asthma, HTN, HLD, panic d/o, DM2, bipolar 2, COPD, lung nodules referred to ER by PMD for IV abx.   PMD (Kaya) had pt CT done 12min spent reviewing chart, discussing plan with family, and planning for discharge./19, demonstrated wedge shaped insult L kidney.     Cx 12/9/19 demonstrated multi drug resistant Morganella Morgani & PMD instructed pt to come to ED for IV abx. Pt was on Cipro to which organism was not susceptible.   Pt is complaining of severe vaginal itching and complaining of of pain when urinating and not dysuria per se.  She was given Rocephin IV x 2 days.   ID was consulted, recommend to stop IV abx and started diflucan for vaginal candidiasis. Patient afebrile, no chills, no leukocytosis, no flank pain.   UCx NGTD.   patient will be dishcarged to follow-up with PMD outpatient.   Continue diflucan 1 tab every 48hours for 2 more doses.   Miconazole suppository for 3 more days      SECONDARY DISCHARGE DIAGNOSES  Diagnosis: Type 2 diabetes mellitus without complication, without long-term current use of insulin  Assessment and Plan of Treatment: Type 2 diabetes mellitus without complication, without long-term current use of insulin

## 2019-12-18 NOTE — DISCHARGE NOTE NURSING/CASE MANAGEMENT/SOCIAL WORK - NSDCPEWEB_GEN_ALL_CORE
[place] website --- [email or web address] Tobacco [email or web address] website --- [email or web address]

## 2019-12-18 NOTE — DISCHARGE NOTE NURSING/CASE MANAGEMENT/SOCIAL WORK - NSDCPNDISPN_GEN_ALL_CORE
Opioids not applicable/not prescribed/Activities of daily living, including home environment that might     exacerbate pain or reduce effectiveness of the pain management plan of care as well as strategies to address these issues/Education provided on the pain management plan of care/Side effects of pain management treatment/Safe use, storage and disposal of opioids when prescribed

## 2019-12-18 NOTE — DISCHARGE NOTE NURSING/CASE MANAGEMENT/SOCIAL WORK - NSDCPEEMAIL_GEN_ALL_CORE
[facility] for Tobacco Control email [email or web address]

## 2019-12-21 DIAGNOSIS — F20.9 SCHIZOPHRENIA, UNSPECIFIED: ICD-10-CM

## 2019-12-21 DIAGNOSIS — Z79.84 LONG TERM (CURRENT) USE OF ORAL HYPOGLYCEMIC DRUGS: ICD-10-CM

## 2019-12-21 DIAGNOSIS — Z79.1 LONG TERM (CURRENT) USE OF NON-STEROIDAL ANTI-INFLAMMATORIES (NSAID): ICD-10-CM

## 2019-12-21 DIAGNOSIS — J44.9 CHRONIC OBSTRUCTIVE PULMONARY DISEASE, UNSPECIFIED: ICD-10-CM

## 2019-12-21 DIAGNOSIS — R91.8 OTHER NONSPECIFIC ABNORMAL FINDING OF LUNG FIELD: ICD-10-CM

## 2019-12-21 DIAGNOSIS — Z16.12 EXTENDED SPECTRUM BETA LACTAMASE (ESBL) RESISTANCE: ICD-10-CM

## 2019-12-21 DIAGNOSIS — B37.3 CANDIDIASIS OF VULVA AND VAGINA: ICD-10-CM

## 2019-12-21 DIAGNOSIS — E11.9 TYPE 2 DIABETES MELLITUS WITHOUT COMPLICATIONS: ICD-10-CM

## 2019-12-21 DIAGNOSIS — Z79.891 LONG TERM (CURRENT) USE OF OPIATE ANALGESIC: ICD-10-CM

## 2019-12-21 DIAGNOSIS — Z79.82 LONG TERM (CURRENT) USE OF ASPIRIN: ICD-10-CM

## 2019-12-21 DIAGNOSIS — F41.0 PANIC DISORDER [EPISODIC PAROXYSMAL ANXIETY]: ICD-10-CM

## 2019-12-21 DIAGNOSIS — F31.9 BIPOLAR DISORDER, UNSPECIFIED: ICD-10-CM

## 2019-12-21 DIAGNOSIS — B96.89 OTHER SPECIFIED BACTERIAL AGENTS AS THE CAUSE OF DISEASES CLASSIFIED ELSEWHERE: ICD-10-CM

## 2019-12-21 DIAGNOSIS — Z16.24 RESISTANCE TO MULTIPLE ANTIBIOTICS: ICD-10-CM

## 2019-12-21 DIAGNOSIS — E78.5 HYPERLIPIDEMIA, UNSPECIFIED: ICD-10-CM

## 2019-12-21 DIAGNOSIS — Z79.51 LONG TERM (CURRENT) USE OF INHALED STEROIDS: ICD-10-CM

## 2019-12-21 DIAGNOSIS — F17.210 NICOTINE DEPENDENCE, CIGARETTES, UNCOMPLICATED: ICD-10-CM

## 2019-12-21 DIAGNOSIS — I10 ESSENTIAL (PRIMARY) HYPERTENSION: ICD-10-CM

## 2019-12-21 DIAGNOSIS — N39.0 URINARY TRACT INFECTION, SITE NOT SPECIFIED: ICD-10-CM

## 2019-12-21 DIAGNOSIS — Z79.899 OTHER LONG TERM (CURRENT) DRUG THERAPY: ICD-10-CM

## 2019-12-22 LAB
CULTURE RESULTS: SIGNIFICANT CHANGE UP
CULTURE RESULTS: SIGNIFICANT CHANGE UP
SPECIMEN SOURCE: SIGNIFICANT CHANGE UP
SPECIMEN SOURCE: SIGNIFICANT CHANGE UP

## 2020-07-17 NOTE — ED ADULT NURSE NOTE - CHPI ED SYMPTOMS POS
ED Attending Physician Note      Patient : Justyna Jackson Age: 40 year old Sex: female   MRN: [id number] Encounter Date: 2020      History         Chief Complaint   Patient presents with   •  Symptoms         HPI: 40 year old female presents with left flank pain gradual onset progressively worse now radiating to the left lower quadrant.  Does feel nauseated and has been having fevers.  Was seen at [facility] outpatient urgent care was diagnosed with pyelonephritis received IM Rocephin and was placed on ciprofloxacin.  Continues to have high heart rate and fevers today over 100.5 states that she is having a dull aching pain.  Denies any vaginal bleeding discharge no concern for STI has never had a kidney stone.  States she cannot get comfortable but her pain is mild now.  Denies any cough congestion runny nose no COVID exposure.  Had a urine and culture sent through [facility] clinic as well as she had a COVID swab there.  Results unknown    No Known Allergies      No past medical history on file.  hypothyroid    Past Surgical History:   Procedure Laterality Date   •  SECTION, LOW TRANSVERSE       Tubal ligation    No family history on file.    Social History     Tobacco Use   • Smoking status: Never Smoker   • Smokeless tobacco: Never Used   Substance Use Topics   • Alcohol use: Yes     Frequency: Never   • Drug use: Not on file     Review of Systems   All other systems reviewed and are negative.      Physical Exam     ED Triage Vitals [20 1920]   ED Triage Vitals Group      Temp 98.2 °F (36.8 °C)      Heart Rate (!) 118      Resp 20      /67      SpO2 97 %      EtCO2 mmHg       Height 5' 7\" (1.702 m)      Weight 206 lb (93.4 kg)      Weight Scale Used ED Stated      BMI (Calculated) 32.26      IBW/kg (Calculated) 61.6       Physical Exam  Vitals signs reviewed.   HENT:      Head: Normocephalic and atraumatic.   Eyes:      Extraocular Movements: Extraocular movements intact.   Neck:       Musculoskeletal: Neck supple.   Cardiovascular:      Rate and Rhythm: Normal rate and regular rhythm.      Pulses: Normal pulses.   Pulmonary:      Effort: Pulmonary effort is normal. No respiratory distress.      Breath sounds: Normal breath sounds.   Abdominal:      General: Abdomen is flat. Bowel sounds are normal. There is no distension.      Palpations: Abdomen is soft.      Tenderness: There is no abdominal tenderness. There is left CVA tenderness. There is no right CVA tenderness, guarding or rebound.   Musculoskeletal: Normal range of motion.   Skin:     General: Skin is warm.   Neurological:      General: No focal deficit present.      Mental Status: She is alert and oriented to person, place, and time.   Psychiatric:         Mood and Affect: Mood normal.         ED Course     Procedures: NONE    EKG: NONE    Critical Care: NONE    Lab Results     Results for orders placed or performed during the hospital encounter of 07/16/20   CBC with Automated Differential   Result Value Ref Range    WBC 11.5 (H) 4.2 - 11.0 K/mcL    RBC 3.50 (L) 4.00 - 5.20 mil/mcL    HGB 10.6 (L) 12.0 - 15.5 g/dL    HCT 32.8 (L) 36.0 - 46.5 %    MCV 93.7 78.0 - 100.0 fl    MCH 30.3 26.0 - 34.0 pg    MCHC 32.3 32.0 - 36.5 g/dL    RDW-CV 13.5 11.0 - 15.0 %     140 - 450 K/mcL    NRBC 0 0 /100 WBC    DIFF TYPE AUTOMATED DIFFERENTIAL     Neutrophil 77 %    LYMPH 13 %    MONO 10 %    EOSIN 0 %    BASO 0 %    Percent Immature Granuloctyes 0 %    Absolute Neutrophil 8.7 (H) 1.8 - 7.7 K/mcL    Absolute Lymph 1.5 1.0 - 4.8 K/mcL    Absolute Mono 1.2 (H) 0.3 - 0.9 K/mcL    Absolute Eos 0.0 (L) 0.1 - 0.5 K/mcL    Absolute Baso 0.0 0.0 - 0.3 K/mcL    Absolute Immature Granulocytes 0.0 0 - 0.2 K/mcl   Comprehensive Metabolic Panel   Result Value Ref Range    Sodium 136 135 - 145 mmol/L    Potassium 3.3 (L) 3.4 - 5.1 mmol/L    Chloride 105 98 - 107 mmol/L    Carbon Dioxide 25 21 - 32 mmol/L    Anion Gap 9 (L) 10 - 20 mmol/L    Glucose 87 65 -  99 mg/dL    BUN 7 6 - 20 mg/dL    Creatinine 0.60 0.51 - 0.95 mg/dL    GFR Estimate,  >90     GFR Estimate, Non African American >90     BUN/Creatinine Ratio 12 7 - 25    CALCIUM 8.5 8.4 - 10.2 mg/dL    TOTAL BILIRUBIN 0.3 0.2 - 1.0 mg/dL    AST/SGOT 11 <38 Units/L    ALT/SGPT 20 <64 Units/L    ALK PHOSPHATASE 59 45 - 117 Units/L    TOTAL PROTEIN 7.5 6.4 - 8.2 g/dL    Albumin 3.5 (L) 3.6 - 5.1 g/dL    GLOBULIN 4.0 2.0 - 4.0 g/dL    A/G Ratio, Serum 0.9 (L) 1.0 - 2.4   Lipase   Result Value Ref Range    Lipase 98 73 - 393 Units/L   URINALYSIS & REFLEX MICRO WITH CULTURE IF INDICATED   Result Value Ref Range    SPECIMEN TYPE URINE CLEAN CATCH     COLOR COLORLESS (A) YELLOW    APPEARANCE CLEAR     GLUCOSE(URINE) NEGATIVE NEGATIVE mg/dL    BILIRUBIN NEGATIVE NEGATIVE    KETONES 20 (A) NEGATIVE mg/dL    SPECIFIC GRAVITY <1.005 (L) 1.005 - 1.030    BLOOD SMALL (A) NEGATIVE    pH 6.0 5.0 - 7.0 Units    PROTEIN(URINE) NEGATIVE NEGATIVE mg/dL    UROBILINOGEN 0.2 0.0 - 1.0 mg/dL    NITRITE NEGATIVE NEGATIVE    LEUKOCYTE ESTERASE TRACE (A) NEGATIVE    Squamous EPI'S 1 to 5 0 - 5 /hpf    RBC 1 to 2 0 - 2 /hpf    WBC 1 to 5 0 - 5 /hpf    BACTERIA FEW (A) NONE SEEN /hpf    Hyaline Casts NONE SEEN 0 - 5 /lpf    MUCOUS PRESENT    Urine Pregnancy Test Clinitek Status (POC)   Result Value Ref Range    HCG Point of Care NEGATIVE NEGATIVE   Lactic Acid, Venous   Result Value Ref Range    Lactic Acid Venous 0.5 0 - 2.0 mmol/L         Radiology Results     Imaging Results          CT ABDOMEN PELVIS WO CONTRAST (Final result)  Result time 07/16/20 23:53:09    Final result                 Impression:      No obstructing urinary tract calculi.  There are two 1-2 mm stones in the vicinity of the distal ureters which could represent nonobstructing stones.    Small fat-containing umbilical hernia.    Electronically Signed by: XIAO KENNEDY M.D.   Signed on: 7/16/2020 11:53 PM                Narrative:    Exam: CT abdomen  and pelvis without contrast.    CLINICAL HISTORY: Left flank pain.  Fever.    TECHNIQUE: Computed tomographic imaging of the abdomen and pelvis was performed without contrast.    Adjustment of the mA and/or kV was done according to the patient's size.    COMPARISON: Renal ultrasound from 03/12/2019.    Gallbladder ultrasound from 07/10/2018.  Liver ultrasound from 08/02/2012.    FINDINGS:    No renal stones are evident.  There is no hydronephrosis.  The ureters are not well visualized along their course due to the lack of surrounding fat.  There is a 1-2 mm calcification the vicinity of the left distal ureter near the left ureterovesical   junction (image 83, series 2) suspicious for a nonobstructing distal ureter stone.  There is a questionable additional distal ureter stone in the vicinity of the left distal ureter measuring 1-2 mm (image 80, series 2).  Several additional punctate   stones are seen within the pelvis which likely are phleboliths.    There are dependent pulmonary opacities, consistent with atelectasis.  There are bilateral breast prostheses.    Sensitivity for detection of solid organ and bowel pathology is reduced without intravenous or enteric contrast.  No focal hepatic or splenic lesion is seen.  The spleen is prominent in size, but does not meet criteria for splenomegaly.    The pancreas is unremarkable.  No radiopaque biliary calculi bile duct dilation is noted.    An adrenal gland nodule is not appreciated.    There is no evidence of bowel obstruction.    The appendix is not definitively identified.  There is no pericecal inflammatory change.    No adnexal mass is identified.  There is a small amount of free fluid within the dependent pelvis which is nonspecific, but could be physiologic in a patient of this age.    There is a small fat-containing umbilical hernia.                                Medical Decision Making             Attempted to look it up in [employer] and unable to find the  results.  I believe she does not need to be a POI as her fever is likely secondary to the pyelonephritis.  Will rule out an infected stone.  Dr. Davin Tam is her primary as such we will admit failed outpatient we will give IV Rocephin 2 g is concern for sepsis.  Will obtain CT results which are still pending.  Patient agreeable hCG negative no acute abdomen.  Denies any STI symptoms as such pelvic deferred.  Noted to conserve swabs for COVID 19 I will hold off as she reportedly has one that was sent from the urgent care.  Notified Dr. Alamo of plan    Concern for infected stone.  Patient did have a little bit of hypotension while she was in the ER she has been given fluids.  Her blood pressure currently is 102/88 her heart rate was 75 and afebrile prior to transport.  Notified Dr. Peguero of concern for possible infected stone but no evidence of obstruction she is not currently septic as her lactic acid is normal though she did have low blood pressure and she did meet Sirs criteria initially.  I believe she is stable for the floor she looks well.  Did put an order in for n.p.o.    No I specifically told this patient that she should strain her urine and she has not eat or drink anything until urology sees her.    Clinical Impression     Pyelonephritis, possible kidney stones  Disposition        Admit 7/16/2020 11:09 PM  Telemetry Bed?: No  Patient Class: Inpatient [1]  Level of Care: Acute [1]  Admission Diagnosis: Pyelonephritis [667725]  Admitting Physician: DAVIN TAM [[id number]]  Requested Unit/Floor: Has a fever but I believe she is asymptomatic surveillance as we have another source, can wear mask  Transferring Patient to? Only adjust for transfers between [facility] [[id number]]  Is this a telephone or verbal order?: This is a telephone order from the admitting physician    Que Mitchell MD   7/17/2020 12:25 AM     Que Mitchell  MD  07/17/20 0025     PAIN

## 2023-01-31 NOTE — PROGRESS NOTE ADULT - ATTENDING COMMENTS
Patient seen and examined.  Currently denies pain and states that she tolerated full liquids.  She continues to have a normal WBC = 8, with normal LFTs, and remains afebrile.    NAD  Abdomen - soft, non-tender    Assessment: 52-year-old woman with chronic intermittent right-sided abdominal pain in the setting of cholelithiasis and a dilated CBD.    Plan:  -  The MRCP was reviewed and was significant for mild CBD dilation and cholelithiasis within a normal appearing gallbladder.  These results were discussed with Dr. Floyd who recommended checking a Ca 19-9, CEA, and likely an outpatient ERCP.  He agreed that her clinical picture is not consistent with acute cholecystitis.  -  I had a long discussion with Ms. Banks regarding the MRCP findings and explained that her pain is most likely either related to whatever is causing her CBD dilation or from symptomatic cholelithiasis.  I have recommended, in agreement with Dr. Floyd, that she first undergo an ERCP to workup her CBD dilation prior to undergoing a cholecystectomy.  I then explained that if she continues to experience symptoms after undergoing the above workup will plan for an elective cholecystectomy.  This plan was discussed with the patient and her father over the phone who are in agreement. Direct laryngoscopy, Vocal Cord injection, esophagoscopy

## 2023-06-07 NOTE — ED PROVIDER NOTE - NSCAREINITIATED _GEN_ER
Katya Bush(Attending) Libtayo Counseling- I discussed with the patient the risks of Libtayo including but not limited to nausea, vomiting, diarrhea, and bone or muscle pain.  The patient verbalized understanding of the proper use and possible adverse effects of Libtayo.  All of the patient's questions and concerns were addressed.

## 2024-02-20 NOTE — ED ADULT NURSE NOTE - GENITOURINARY WDL
Called pt to schedule her EGD with Dr. Ash. Pt did not answer, voicemail left for pt to return my call.   
Bladder non-tender and non-distended. Urine clear yellow.

## 2024-02-28 NOTE — ED ADULT NURSE NOTE - NS_SISCREENINGSR_GEN_ALL_ED
Licensed Psychologist and Psychology Trainee Psychology Trainee only Psychology Trainee only Licensed Psychologist Licensed Psychologist and Psychology Trainee Negative

## 2024-07-03 NOTE — ED ADULT NURSE NOTE - DISCHARGE DATE/TIME
Take antibiotic twice a day for the next 10 days  Follow-up with [place] for further evaluation  Take Tylenol or ibuprofen every 6 hours for pain relief  Can use Norco every 6 hours in addition for pain relief.     18-Mar-2018 03:56

## 2024-08-25 NOTE — ED ADULT NURSE NOTE - ISOLATION TYPE:
Group Therapy Note    Date: 8/25/2024    Group Start Time: 0915  Group End Time: 0945  Group Topic: Nursing    Izabella Haskins, RN        Group Therapy Note    Attendees: 4/11           Notes:  patient did not participate in group        Signature:  Izabella Palacios RN     None

## 2025-06-24 NOTE — PATIENT PROFILE ADULT. - NS PRO PT REFERRAL QUES 2 YN
Called and spoke with the patient. Informing the patient of the message commented below from Dr. Childers. Patient acknowledged this information.     ----- Message from Marcin Childers sent at 6/20/2025  5:10 PM EDT -----  Okay.  You might want to notify him that we had him on spironolactone in the past and had to discontinue because of gynecomastia and breast pain.  If he has these issues he will need to discuss that with them.  ----- Message -----  From: Caitlin Andrews MA  Sent: 6/20/2025  12:57 PM EDT  To: Marcin Childers, DO    [facility], WAS ADVISED TO CALL YOU ABOUT THIS   no

## 2025-09-05 ENCOUNTER — TRANSCRIPTION ENCOUNTER (OUTPATIENT)
Age: 60
End: 2025-09-05